# Patient Record
Sex: FEMALE | Race: WHITE | NOT HISPANIC OR LATINO | Employment: OTHER | URBAN - METROPOLITAN AREA
[De-identification: names, ages, dates, MRNs, and addresses within clinical notes are randomized per-mention and may not be internally consistent; named-entity substitution may affect disease eponyms.]

---

## 2017-02-13 ENCOUNTER — OFFICE VISIT (OUTPATIENT)
Dept: INTERNAL MEDICINE | Facility: CLINIC | Age: 72
End: 2017-02-13

## 2017-02-13 VITALS
OXYGEN SATURATION: 99 % | BODY MASS INDEX: 24.14 KG/M2 | HEART RATE: 77 BPM | DIASTOLIC BLOOD PRESSURE: 89 MMHG | WEIGHT: 132 LBS | SYSTOLIC BLOOD PRESSURE: 190 MMHG

## 2017-02-13 DIAGNOSIS — I10 ESSENTIAL HYPERTENSION: Primary | ICD-10-CM

## 2017-02-13 DIAGNOSIS — S99.922A FOOT TRAUMA, LEFT, INITIAL ENCOUNTER: ICD-10-CM

## 2017-02-13 PROBLEM — S99.929A FOOT TRAUMA: Status: ACTIVE | Noted: 2017-02-13

## 2017-02-13 PROCEDURE — 99214 OFFICE O/P EST MOD 30 MIN: CPT | Performed by: INTERNAL MEDICINE

## 2017-02-13 RX ORDER — ESCITALOPRAM OXALATE 5 MG/1
5 TABLET ORAL DAILY
Qty: 30 TABLET | Refills: 4 | Status: SHIPPED | OUTPATIENT
Start: 2017-02-13 | End: 2017-03-27 | Stop reason: SINTOL

## 2017-02-13 RX ORDER — AMLODIPINE BESYLATE 5 MG/1
5 TABLET ORAL DAILY
Qty: 30 TABLET | Refills: 6 | Status: SHIPPED | OUTPATIENT
Start: 2017-02-13 | End: 2017-08-28 | Stop reason: SINTOL

## 2017-02-13 NOTE — PROGRESS NOTES
Chief Complaint   Patient presents with   • Foot Pain     L foot fell about 2 months ago   • Hypertension       History of Present Illness   Brielle Jorge is a 72 y.o. female presents for acute care. Reports bending toes back 1 month ago. She had a great deal of pain w/ swelling and bruising at that time. Taking advil almost every night after the injury.   Has hypertension. She is taking carvedilol bid only for hypertension. Checking bp at home and it is running 130s-140s/70s-80s. No symptoms. She previously was taking lisinopril but stopped in 2015 when she was having hearing loss and patient had concerns that this could be linked to lisinopril. She was then tried on losartan and developed diarrhea.       The following portions of the patient's history were reviewed and updated as appropriate: allergies, current medications, past family history, past medical history, past social history, past surgical history and problem list.  Current Outpatient Prescriptions on File Prior to Visit   Medication Sig Dispense Refill   • albuterol (PROVENTIL HFA;VENTOLIN HFA) 108 (90 BASE) MCG/ACT inhaler Inhale 2 puffs every 4 (four) hours as needed for wheezing. 6.7 g 11   • amLODIPine (NORVASC) 2.5 MG tablet Take 1 tablet by mouth Daily. 30 tablet 1   • carvedilol (COREG) 12.5 MG tablet Take 1 tablet by mouth 2 (Two) Times a Day With Meals. 60 tablet 3   • hydrochlorothiazide (MICROZIDE) 12.5 MG capsule Take 1 capsule by mouth every morning. 30 capsule 6   • levothyroxine (SYNTHROID, LEVOTHROID) 88 MCG tablet Take 1 tablet by mouth daily. 30 tablet 5   • montelukast (SINGULAIR) 10 MG tablet Take 1 tablet by mouth Every Night. 30 tablet 6   • [DISCONTINUED] benzonatate (TESSALON) 200 MG capsule Take 1 capsule by mouth 3 (Three) Times a Day As Needed for cough. 30 capsule 1   • [DISCONTINUED] guaifenesin-codeine (GUAIFENESIN AC) 100-10 MG/5ML liquid Take 5 mL by mouth 3 (Three) Times a Day As Needed for cough. 118 mL 0     No current  facility-administered medications on file prior to visit.      Review of Systems   Constitutional: Negative.    HENT: Negative.    Eyes: Negative.    Respiratory: Negative.    Cardiovascular: Negative.    Gastrointestinal: Negative.    Endocrine: Negative.    Genitourinary: Negative.    Musculoskeletal: Negative.    Skin: Negative.    Allergic/Immunologic: Negative.    Neurological: Negative.    Hematological: Negative.    Psychiatric/Behavioral: Negative.        Objective   Physical Exam   Constitutional: She is oriented to person, place, and time. She appears well-developed and well-nourished.   HENT:   Head: Normocephalic and atraumatic.   Right Ear: External ear normal.   Left Ear: External ear normal.   Nose: Nose normal.   Mouth/Throat: Oropharynx is clear and moist.   Eyes: Conjunctivae and EOM are normal. Pupils are equal, round, and reactive to light.   Neck: Normal range of motion. Neck supple.   Cardiovascular: Normal rate, regular rhythm, normal heart sounds and intact distal pulses.    Pulmonary/Chest: Effort normal and breath sounds normal.   Abdominal: Soft. Bowel sounds are normal.   Musculoskeletal: Normal range of motion.   Left foot w/ mild erythema. dysfigurement of left great toe but appears chronic and is related to a surgical scar at [facility] location. Able to move in all directions and palpate fully w/out tenderness. Able to weight bear w/out tenderness.    Neurological: She is alert and oriented to person, place, and time. She has normal reflexes.   Skin: Skin is warm and dry.   Psychiatric: She has a normal mood and affect. Her behavior is normal. Judgment and thought content normal.   Nursing note and vitals reviewed.       Visit Vitals   • BP (!) 190/89   • Pulse 77   • Wt 132 lb (59.9 kg)   • SpO2 99%   • BMI 24.14 kg/m2     Benicar/hctz 20/12.5 given at 2:15  bp at 2:45_   Assessment/Plan   Diagnoses and all orders for this visit:    Essential hypertension    Foot trauma, left, initial  encounter      Patient w/ hypertension now hypertensive urgency. She has been taking ibuprofen qhs and is directed to discontinue this medication and take tylenol prn pain. She will continue bid carvedilol. Will start hctz 12.5 mg daily and titrate dose as needed. May also increase carvedilol to 25 bid if needed v start norvasc daily. Will repeat bp in 1 week.

## 2017-03-07 RX ORDER — LEVOTHYROXINE SODIUM 88 UG/1
TABLET ORAL
Qty: 30 TABLET | Refills: 5 | Status: SHIPPED | OUTPATIENT
Start: 2017-03-07 | End: 2017-09-04 | Stop reason: SDUPTHER

## 2017-03-27 ENCOUNTER — OFFICE VISIT (OUTPATIENT)
Dept: INTERNAL MEDICINE | Facility: CLINIC | Age: 72
End: 2017-03-27

## 2017-03-27 VITALS
WEIGHT: 129 LBS | HEART RATE: 75 BPM | OXYGEN SATURATION: 99 % | BODY MASS INDEX: 23.59 KG/M2 | DIASTOLIC BLOOD PRESSURE: 72 MMHG | SYSTOLIC BLOOD PRESSURE: 158 MMHG

## 2017-03-27 DIAGNOSIS — F41.9 ANXIETY: ICD-10-CM

## 2017-03-27 DIAGNOSIS — I10 ESSENTIAL HYPERTENSION: Primary | ICD-10-CM

## 2017-03-27 DIAGNOSIS — E03.9 ACQUIRED HYPOTHYROIDISM: ICD-10-CM

## 2017-03-27 DIAGNOSIS — E78.5 HYPERLIPIDEMIA, UNSPECIFIED HYPERLIPIDEMIA TYPE: ICD-10-CM

## 2017-03-27 LAB
ALBUMIN SERPL-MCNC: 4.6 G/DL (ref 3.5–5.2)
ALBUMIN/GLOB SERPL: 1.7 G/DL
ALP SERPL-CCNC: 101 U/L (ref 39–117)
ALT SERPL-CCNC: 19 U/L (ref 1–33)
AST SERPL-CCNC: 27 U/L (ref 1–32)
BASOPHILS # BLD AUTO: 0.03 10*3/MM3 (ref 0–0.2)
BASOPHILS NFR BLD AUTO: 0.4 % (ref 0–1.5)
BILIRUB SERPL-MCNC: 0.4 MG/DL (ref 0.1–1.2)
BUN SERPL-MCNC: 13 MG/DL (ref 8–23)
BUN/CREAT SERPL: 24.1 (ref 7–25)
CALCIUM SERPL-MCNC: 10.3 MG/DL (ref 8.6–10.5)
CHLORIDE SERPL-SCNC: 101 MMOL/L (ref 98–107)
CHOLEST SERPL-MCNC: 263 MG/DL (ref 0–200)
CO2 SERPL-SCNC: 26.8 MMOL/L (ref 22–29)
CREAT SERPL-MCNC: 0.54 MG/DL (ref 0.57–1)
EOSINOPHIL # BLD AUTO: 0.32 10*3/MM3 (ref 0–0.7)
EOSINOPHIL NFR BLD AUTO: 4.4 % (ref 0.3–6.2)
ERYTHROCYTE [DISTWIDTH] IN BLOOD BY AUTOMATED COUNT: 14 % (ref 11.7–13)
GLOBULIN SER CALC-MCNC: 2.7 GM/DL
GLUCOSE SERPL-MCNC: 97 MG/DL (ref 65–99)
HCT VFR BLD AUTO: 38.7 % (ref 35.6–45.5)
HDLC SERPL-MCNC: 76 MG/DL (ref 40–60)
HGB BLD-MCNC: 12.8 G/DL (ref 11.9–15.5)
IMM GRANULOCYTES # BLD: 0.02 10*3/MM3 (ref 0–0.03)
IMM GRANULOCYTES NFR BLD: 0.3 % (ref 0–0.5)
LDLC SERPL CALC-MCNC: 153 MG/DL (ref 0–100)
LDLC/HDLC SERPL: 2.01 {RATIO}
LYMPHOCYTES # BLD AUTO: 1.84 10*3/MM3 (ref 0.9–4.8)
LYMPHOCYTES NFR BLD AUTO: 25.1 % (ref 19.6–45.3)
MCH RBC QN AUTO: 29.2 PG (ref 26.9–32)
MCHC RBC AUTO-ENTMCNC: 33.1 G/DL (ref 32.4–36.3)
MCV RBC AUTO: 88.2 FL (ref 80.5–98.2)
MONOCYTES # BLD AUTO: 0.58 10*3/MM3 (ref 0.2–1.2)
MONOCYTES NFR BLD AUTO: 7.9 % (ref 5–12)
NEUTROPHILS # BLD AUTO: 4.55 10*3/MM3 (ref 1.9–8.1)
NEUTROPHILS NFR BLD AUTO: 61.9 % (ref 42.7–76)
PLATELET # BLD AUTO: 430 10*3/MM3 (ref 140–500)
POTASSIUM SERPL-SCNC: 4.1 MMOL/L (ref 3.5–5.2)
PROT SERPL-MCNC: 7.3 G/DL (ref 6–8.5)
RBC # BLD AUTO: 4.39 10*6/MM3 (ref 3.9–5.2)
SODIUM SERPL-SCNC: 142 MMOL/L (ref 136–145)
TRIGL SERPL-MCNC: 172 MG/DL (ref 0–150)
TSH SERPL DL<=0.005 MIU/L-ACNC: 1.74 MIU/ML (ref 0.27–4.2)
VLDLC SERPL CALC-MCNC: 34.4 MG/DL (ref 5–40)
WBC # BLD AUTO: 7.34 10*3/MM3 (ref 4.5–10.7)

## 2017-03-27 PROCEDURE — 99214 OFFICE O/P EST MOD 30 MIN: CPT | Performed by: INTERNAL MEDICINE

## 2017-03-27 RX ORDER — SERTRALINE HYDROCHLORIDE 25 MG/1
25 TABLET, FILM COATED ORAL DAILY
Qty: 30 TABLET | Refills: 4 | Status: SHIPPED | OUTPATIENT
Start: 2017-03-27 | End: 2017-08-28

## 2017-03-27 NOTE — PROGRESS NOTES
Chief Complaint   Patient presents with   • Hypertension   anxiety  Hypothyroidism  hyperlipidemia    History of Present Illness   Brielle Jorge is a 72 y.o. female presents for follow up evaluation on blood pressure. At home her bp levels have been normotensive. Cuff does have discrepencies from our readings here. She is taking carvedilol, norvasc, and hctz now. She feels that she is noticing some gi upset w/ diarrhea after starting amlodipine. She has hypothryoidism and feels clinically euthyroid. She is anxious.     The following portions of the patient's history were reviewed and updated as appropriate: allergies, current medications, past family history, past medical history, past social history, past surgical history and problem list.  Current Outpatient Prescriptions on File Prior to Visit   Medication Sig Dispense Refill   • albuterol (PROVENTIL HFA;VENTOLIN HFA) 108 (90 BASE) MCG/ACT inhaler Inhale 2 puffs every 4 (four) hours as needed for wheezing. 6.7 g 11   • amLODIPine (NORVASC) 5 MG tablet Take 1 tablet by mouth Daily. 30 tablet 6   • carvedilol (COREG) 12.5 MG tablet Take 1 tablet by mouth 2 (Two) Times a Day With Meals. 60 tablet 3   • hydrochlorothiazide (MICROZIDE) 12.5 MG capsule Take 1 capsule by mouth every morning. 30 capsule 6   • levothyroxine (SYNTHROID, LEVOTHROID) 88 MCG tablet TAKE 1 TABLET BY MOUTH DAILY 30 tablet 5   • montelukast (SINGULAIR) 10 MG tablet Take 1 tablet by mouth Every Night. 30 tablet 6   • [DISCONTINUED] escitalopram (LEXAPRO) 5 MG tablet Take 1 tablet by mouth Daily. 30 tablet 4     No current facility-administered medications on file prior to visit.      Review of Systems   Constitutional: Negative.    HENT: Negative.    Eyes: Negative.    Respiratory: Negative.    Cardiovascular: Negative.    Gastrointestinal:        Some gi discomfort after taking amlodipine. Improving.    Endocrine: Negative.    Genitourinary: Negative.         Some freq and rare leakage    Musculoskeletal: Negative.    Skin: Negative.    Neurological: Negative.    Hematological: Negative.    Psychiatric/Behavioral: Positive for sleep disturbance.       Objective   Physical Exam   Constitutional: She is oriented to person, place, and time. She appears well-developed and well-nourished.   HENT:   Head: Normocephalic and atraumatic.   Cerumen B  Removed w/ lighted loops.    Eyes: Conjunctivae and EOM are normal. Pupils are equal, round, and reactive to light.   Neck: Normal range of motion. Neck supple.   Cardiovascular: Normal rate, regular rhythm and normal heart sounds.    Pulmonary/Chest: Effort normal and breath sounds normal.   Abdominal: Soft. Bowel sounds are normal.   Musculoskeletal: Normal range of motion.   Neurological: She is alert and oriented to person, place, and time. She has normal reflexes.   Skin: Skin is warm and dry.   Psychiatric: She has a normal mood and affect. Her behavior is normal. Judgment and thought content normal.   Nursing note and vitals reviewed.       /72  Pulse 75  Wt 129 lb (58.5 kg)  SpO2 99%  BMI 23.59 kg/m2    Assessment/Plan   Diagnoses and all orders for this visit:    Hyperlipidemia, unspecified hyperlipidemia type    Essential hypertension    Acquired hypothyroidism    Anxiety    Other orders  -     sertraline (ZOLOFT) 25 MG tablet; Take 1 tablet by mouth Daily.      Patient w/ hypertension. bp is improving but still elevated. She has some continued anxious symptoms. Will try zoloft daily and monitor bp. May increase carvedilol in there future if needed. Will test thyroid levels. Has hyperlip. Has been statin intolerant. Will test levels. She will f/u in 4-6 months or prn.

## 2017-03-27 NOTE — PROGRESS NOTES
Your laboratory results are NORMAL except for elevated cholesterol levels. Reduce dietary cholesterol. We will discuss these results in detail at your next office visit. Please call with any questions or concerns.  Sincerely,  Kacie Loving MD

## 2017-04-07 RX ORDER — ALBUTEROL SULFATE 90 UG/1
AEROSOL, METERED RESPIRATORY (INHALATION)
Qty: 18 INHALER | Refills: 0 | Status: SHIPPED | OUTPATIENT
Start: 2017-04-07 | End: 2018-03-13 | Stop reason: SDUPTHER

## 2017-04-12 RX ORDER — CARVEDILOL 12.5 MG/1
TABLET ORAL
Qty: 60 TABLET | Refills: 3 | Status: SHIPPED | OUTPATIENT
Start: 2017-04-12 | End: 2017-08-16 | Stop reason: SDUPTHER

## 2017-04-14 ENCOUNTER — TELEPHONE (OUTPATIENT)
Dept: INTERNAL MEDICINE | Facility: CLINIC | Age: 72
End: 2017-04-14

## 2017-04-14 NOTE — TELEPHONE ENCOUNTER
Pt is in [place] and says that her feet are swelling  Thinks it is her Norvasc wants to know if she can just take her Norvasc     i LM to call me to get more info

## 2017-04-26 RX ORDER — HYDROCHLOROTHIAZIDE 12.5 MG/1
CAPSULE, GELATIN COATED ORAL
Qty: 30 CAPSULE | Refills: 1 | Status: SHIPPED | OUTPATIENT
Start: 2017-04-26 | End: 2018-01-02

## 2017-06-01 RX ORDER — LEVOTHYROXINE SODIUM 0.07 MG/1
TABLET ORAL
Qty: 90 TABLET | Refills: 0 | Status: SHIPPED | OUTPATIENT
Start: 2017-06-01 | End: 2017-08-14

## 2017-07-13 ENCOUNTER — TRANSCRIBE ORDERS (OUTPATIENT)
Dept: INTERNAL MEDICINE | Facility: CLINIC | Age: 72
End: 2017-07-13

## 2017-07-13 DIAGNOSIS — Z12.31 VISIT FOR SCREENING MAMMOGRAM: Primary | ICD-10-CM

## 2017-08-03 DIAGNOSIS — I10 ESSENTIAL HYPERTENSION: Primary | ICD-10-CM

## 2017-08-03 DIAGNOSIS — E03.9 ACQUIRED HYPOTHYROIDISM: ICD-10-CM

## 2017-08-03 DIAGNOSIS — E78.5 HYPERLIPIDEMIA, UNSPECIFIED HYPERLIPIDEMIA TYPE: ICD-10-CM

## 2017-08-07 LAB
BUN SERPL-MCNC: 14 MG/DL (ref 8–23)
BUN/CREAT SERPL: 25.5 (ref 7–25)
CALCIUM SERPL-MCNC: 10.3 MG/DL (ref 8.6–10.5)
CHLORIDE SERPL-SCNC: 102 MMOL/L (ref 98–107)
CHOLEST SERPL-MCNC: 244 MG/DL (ref 0–200)
CO2 SERPL-SCNC: 25.7 MMOL/L (ref 22–29)
CREAT SERPL-MCNC: 0.55 MG/DL (ref 0.57–1)
GLUCOSE SERPL-MCNC: 99 MG/DL (ref 65–99)
HDLC SERPL-MCNC: 69 MG/DL (ref 40–60)
LDLC SERPL CALC-MCNC: 144 MG/DL (ref 0–100)
LDLC/HDLC SERPL: 2.08 {RATIO}
POTASSIUM SERPL-SCNC: 4.2 MMOL/L (ref 3.5–5.2)
SODIUM SERPL-SCNC: 142 MMOL/L (ref 136–145)
TRIGL SERPL-MCNC: 156 MG/DL (ref 0–150)
VLDLC SERPL CALC-MCNC: 31.2 MG/DL (ref 5–40)

## 2017-08-14 ENCOUNTER — OFFICE VISIT (OUTPATIENT)
Dept: INTERNAL MEDICINE | Facility: CLINIC | Age: 72
End: 2017-08-14

## 2017-08-14 VITALS
SYSTOLIC BLOOD PRESSURE: 140 MMHG | HEART RATE: 80 BPM | BODY MASS INDEX: 23.96 KG/M2 | OXYGEN SATURATION: 98 % | WEIGHT: 131 LBS | DIASTOLIC BLOOD PRESSURE: 84 MMHG

## 2017-08-14 DIAGNOSIS — E03.9 ACQUIRED HYPOTHYROIDISM: ICD-10-CM

## 2017-08-14 DIAGNOSIS — Z00.00 HEALTHCARE MAINTENANCE: ICD-10-CM

## 2017-08-14 DIAGNOSIS — E78.5 HYPERLIPIDEMIA, UNSPECIFIED HYPERLIPIDEMIA TYPE: ICD-10-CM

## 2017-08-14 DIAGNOSIS — I10 ESSENTIAL HYPERTENSION: Primary | ICD-10-CM

## 2017-08-14 DIAGNOSIS — F41.9 ANXIETY: ICD-10-CM

## 2017-08-14 PROCEDURE — G0439 PPPS, SUBSEQ VISIT: HCPCS | Performed by: INTERNAL MEDICINE

## 2017-08-14 PROCEDURE — 99214 OFFICE O/P EST MOD 30 MIN: CPT | Performed by: INTERNAL MEDICINE

## 2017-08-14 NOTE — PATIENT INSTRUCTIONS
Medicare Wellness  Personal Prevention Plan of Service     Date of Office Visit:  2017  Encounter Provider:  Kacie Loving MD  Place of Service:  [facility]  Patient Name: Brielle Jorge  :  1945    As part of the Medicare Wellness portion of your visit today, we are providing you with this personalized preventive plan of services (PPPS). This plan is based upon recommendations of the [place] Preventive Services Task Force (USPSTF) and the Advisory Committee on Immunization Practices (ACIP).    This lists the preventive care services that should be considered, and provides dates of when you are due. Items listed as completed are up-to-date and do not require any further intervention.    Health Maintenance   Topic Date Due   • TDAP/TD VACCINES (1 - Tdap) 2014   • PNEUMOCOCCAL VACCINES (65+ LOW/MEDIUM RISK) (2 of 2 - PPSV23) 2017   • INFLUENZA VACCINE  2017   • DXA SCAN  2017   • LIPID PANEL  2018   • MEDICARE ANNUAL WELLNESS  2018   • MAMMOGRAM  2018   • COLONOSCOPY  2025   • ZOSTER VACCINE  Completed       Orders Placed This Encounter   Procedures   • ECG 12 Lead     Order Specific Question:   Reason for Exam:     Answer:   hm       Return in about 6 months (around 2018) for Recheck.

## 2017-08-14 NOTE — PROGRESS NOTES
Subjective   AWV, htn, hypothyroid, anxiety, hyperlipidemia, osteopenia    Brielle Jorge is a 72 y.o. female who presents for an annual wellness visit and review of chronic issues.  She is doing well today. She has a history of hypothyroidism that is well managed w/ synthroid therapy. She has hypertension that is difficult to manage. At home she reports bp running 120s-140s/ 70s-80s. Negative evaluation for renal arterial stenosis. She has some anxious symptoms and zoloft was rxd at her last visit. She did not feel well taking this so it was discontinued. She has dyslipidemia and is not interested in lipid lowering therapy due to statin intolerance. She has osteoporosis. She has been offered meds but has had concerns of possible side effects so has not started.       Review of Systems   Constitutional: Negative.    HENT: Negative.    Eyes: Negative.    Respiratory: Negative.    Cardiovascular: Negative.    Gastrointestinal: Negative.    Endocrine: Negative.    Genitourinary: Negative.    Musculoskeletal: Negative.    Skin: Negative.    Allergic/Immunologic: Negative.    Neurological: Negative.    Hematological: Negative.    Psychiatric/Behavioral: Negative.        The following portions of the patient's history were reviewed and updated as appropriate: allergies, current medications, past family history, past medical history, past social history, past surgical history and problem list.     Patient Active Problem List   Diagnosis   • Anxiety   • Hyperlipidemia   • Hypertension   • Hypothyroidism   • Pain in shoulder   • Bronchitis   • Leg edema, left   • Foot trauma       Past Medical History:   Diagnosis Date   • Acute otitis media    • Cystocele    • Fracture of patella    • Hyperlipidemia    • Hypothyroidism    • Rectocele    • Right shoulder pain        Past Surgical History:   Procedure Laterality Date   • CHOLECYSTECTOMY     • HERNIA REPAIR     • HYSTERECTOMY     • OOPHORECTOMY     • OTHER SURGICAL HISTORY       HIP REPLACEMENT, RIGHT   • REPAIR RECTOCELE         Family History   Problem Relation Age of Onset   • Hypertension Mother    • Thyroid disease Mother    • Asthma Father    • Emphysema Father    • Alcohol abuse Father    • Hypertension Father    • Thyroid disease Daughter    • Diabetes type I Daughter    • Stroke Maternal Grandfather      ischemic       Social History     Social History   • Marital status:      Spouse name: N/A   • Number of children: N/A   • Years of education: N/A     Occupational History   • Not on file.     Social History Main Topics   • Smoking status: Never Smoker   • Smokeless tobacco: Not on file   • Alcohol use No   • Drug use: Not on file   • Sexual activity: Not on file     Other Topics Concern   • Not on file     Social History Narrative       Current Outpatient Prescriptions on File Prior to Visit   Medication Sig Dispense Refill   • carvedilol (COREG) 12.5 MG tablet TAKE 1 TABLET BY MOUTH 2 (TWO) TIMES A DAY WITH MEALS. 60 tablet 3   • hydrochlorothiazide (MICROZIDE) 12.5 MG capsule TAKE 1 CAPSULE BY MOUTH EVERY MORNING. 30 capsule 1   • levothyroxine (SYNTHROID, LEVOTHROID) 88 MCG tablet TAKE 1 TABLET BY MOUTH DAILY 30 tablet 5   • montelukast (SINGULAIR) 10 MG tablet Take 1 tablet by mouth Every Night. 30 tablet 6   • VENTOLIN  (90 BASE) MCG/ACT inhaler INHALE 2 PUFFS EVERY 4 (FOUR) HOURS AS NEEDED FOR WHEEZING. 18 inhaler 0   • amLODIPine (NORVASC) 5 MG tablet Take 1 tablet by mouth Daily. 30 tablet 6   • sertraline (ZOLOFT) 25 MG tablet Take 1 tablet by mouth Daily. 30 tablet 4   • [DISCONTINUED] levothyroxine (SYNTHROID, LEVOTHROID) 75 MCG tablet TAKE 1 TABLET BY MOUTH DAILY 90 tablet 0     No current facility-administered medications on file prior to visit.        Allergies   Allergen Reactions   • Codeine    • Penicillins Hives   • Statins Myalgia   • Sulfa Antibiotics        Immunization History   Administered Date(s) Administered   • Influenza, Quadrivalent  08/31/2015   • Pneumococcal Conjugate 13-Valent 08/08/2016   • Pneumococcal Polysaccharide 01/01/2009   • Td 07/15/2014   • Zoster 01/01/2008       Objective     /84  Pulse 80  Wt 131 lb (59.4 kg)  SpO2 98%  BMI 23.96 kg/m2    Physical Exam   Constitutional: She is oriented to person, place, and time. She appears well-developed and well-nourished.   HENT:   Head: Normocephalic and atraumatic.   Right Ear: External ear normal.   Left Ear: External ear normal.   Nose: Nose normal.   Mouth/Throat: Oropharynx is clear and moist.   Eyes: EOM are normal. Pupils are equal, round, and reactive to light.   Neck: Normal range of motion. Neck supple.   Cardiovascular: Normal rate, regular rhythm and normal heart sounds.    Pulmonary/Chest: Effort normal and breath sounds normal. No respiratory distress.   Abdominal: Soft. Bowel sounds are normal.   Genitourinary: Vagina normal. Rectal exam shows guaiac negative stool. No vaginal discharge found.   Genitourinary Comments: Uterus surgically absent   Musculoskeletal: Normal range of motion.   Neurological: She is alert and oriented to person, place, and time.   Skin: Skin is warm and dry.   Psychiatric: She has a normal mood and affect. Her behavior is normal. Judgment and thought content normal.   Nursing note and vitals reviewed.  EKG sinus nonspecific st changes. Unchanged from prior tracings.     Assessment/Plan   Brielle was seen today for annual exam.    Diagnoses and all orders for this visit:    Essential hypertension  -     ECG 12 Lead    Healthcare maintenance    Acquired hypothyroidism    Hyperlipidemia, unspecified hyperlipidemia type    Anxiety        Discussion    AWV.  See scanned forms for mera history, PHQ-9, functional ability questionnaire, cognitive impairment screening and preventive services check list.  These were all reviewed with the patient and the patient was provided with a copy of the preventative services checklist. Patient was given health  advice or handouts on the following:  nutrition, fall prevention, exercise. She will continue a healthy diet w/ routine fitness. She will continue medication for bp, and thyroid. She will try red yeast rice for lipid lowering benefits. Will test DEXA and consider prolia or other. Low sodium diet w/ copious fluids. Remain off of zoloft for anxiety.              Future Appointments  Date Time Provider Department Center   8/21/2017 9:00 AM [facility]  [facility]

## 2017-08-16 RX ORDER — CARVEDILOL 12.5 MG/1
TABLET ORAL
Qty: 60 TABLET | Refills: 3 | Status: SHIPPED | OUTPATIENT
Start: 2017-08-16 | End: 2017-11-21 | Stop reason: SDUPTHER

## 2017-08-21 ENCOUNTER — HOSPITAL ENCOUNTER (OUTPATIENT)
Dept: MAMMOGRAPHY | Facility: HOSPITAL | Age: 72
Discharge: HOME | End: 2017-08-21
Admitting: INTERNAL MEDICINE

## 2017-08-21 ENCOUNTER — CLINICAL SUPPORT (OUTPATIENT)
Dept: INTERNAL MEDICINE | Facility: CLINIC | Age: 72
End: 2017-08-21

## 2017-08-21 DIAGNOSIS — Z12.31 VISIT FOR SCREENING MAMMOGRAM: ICD-10-CM

## 2017-08-21 DIAGNOSIS — Z78.0 POSTMENOPAUSAL: Primary | ICD-10-CM

## 2017-08-21 PROCEDURE — G0202 SCR MAMMO BI INCL CAD: HCPCS

## 2017-08-21 PROCEDURE — 77080 DXA BONE DENSITY AXIAL: CPT | Performed by: INTERNAL MEDICINE

## 2017-08-28 RX ORDER — ALENDRONATE SODIUM 70 MG/1
70 TABLET ORAL
Qty: 12 TABLET | Refills: 2 | Status: SHIPPED | OUTPATIENT
Start: 2017-08-28 | End: 2018-01-02

## 2017-09-05 RX ORDER — LEVOTHYROXINE SODIUM 88 UG/1
TABLET ORAL
Qty: 30 TABLET | Refills: 5 | Status: SHIPPED | OUTPATIENT
Start: 2017-09-05 | End: 2017-12-31 | Stop reason: SDUPTHER

## 2017-11-21 RX ORDER — CARVEDILOL 12.5 MG/1
12.5 TABLET ORAL 2 TIMES DAILY WITH MEALS
Qty: 180 TABLET | Refills: 1 | Status: SHIPPED | OUTPATIENT
Start: 2017-11-21 | End: 2018-06-28 | Stop reason: SDUPTHER

## 2017-12-28 RX ORDER — FLUTICASONE PROPIONATE 50 MCG
SPRAY, SUSPENSION (ML) NASAL
Qty: 16 ML | Refills: 2 | Status: SHIPPED | OUTPATIENT
Start: 2017-12-28 | End: 2018-04-16 | Stop reason: SDUPTHER

## 2018-01-02 ENCOUNTER — OFFICE VISIT (OUTPATIENT)
Dept: INTERNAL MEDICINE | Facility: CLINIC | Age: 73
End: 2018-01-02

## 2018-01-02 ENCOUNTER — TELEPHONE (OUTPATIENT)
Dept: INTERNAL MEDICINE | Facility: CLINIC | Age: 73
End: 2018-01-02

## 2018-01-02 VITALS
DIASTOLIC BLOOD PRESSURE: 80 MMHG | TEMPERATURE: 99.2 F | HEART RATE: 84 BPM | SYSTOLIC BLOOD PRESSURE: 140 MMHG | OXYGEN SATURATION: 95 %

## 2018-01-02 DIAGNOSIS — R50.81 FEVER IN OTHER DISEASES: Primary | ICD-10-CM

## 2018-01-02 DIAGNOSIS — J01.00 ACUTE NON-RECURRENT MAXILLARY SINUSITIS: ICD-10-CM

## 2018-01-02 DIAGNOSIS — I10 ESSENTIAL HYPERTENSION: ICD-10-CM

## 2018-01-02 LAB
EXPIRATION DATE: NORMAL
FLUAV AG NPH QL: NORMAL
FLUBV AG NPH QL: NORMAL
INTERNAL CONTROL: NORMAL
Lab: NORMAL

## 2018-01-02 PROCEDURE — 87804 INFLUENZA ASSAY W/OPTIC: CPT | Performed by: INTERNAL MEDICINE

## 2018-01-02 PROCEDURE — 99214 OFFICE O/P EST MOD 30 MIN: CPT | Performed by: INTERNAL MEDICINE

## 2018-01-02 RX ORDER — DOXYCYCLINE HYCLATE 100 MG/1
100 TABLET, DELAYED RELEASE ORAL 2 TIMES DAILY
Qty: 14 TABLET | Refills: 0 | Status: SHIPPED | OUTPATIENT
Start: 2018-01-02 | End: 2018-02-12

## 2018-01-02 RX ORDER — SPIRONOLACTONE 50 MG/1
50 TABLET, FILM COATED ORAL DAILY
Qty: 30 TABLET | Refills: 5 | Status: SHIPPED | OUTPATIENT
Start: 2018-01-02 | End: 2018-08-20 | Stop reason: SDUPTHER

## 2018-01-02 RX ORDER — GUAIFENESIN AND CODEINE PHOSPHATE 100; 10 MG/5ML; MG/5ML
5 SOLUTION ORAL 3 TIMES DAILY PRN
Qty: 180 ML | Refills: 1 | Status: SHIPPED | OUTPATIENT
Start: 2018-01-02 | End: 2018-08-20

## 2018-01-02 RX ORDER — LEVOTHYROXINE SODIUM 88 UG/1
TABLET ORAL
Qty: 30 TABLET | Refills: 1 | Status: SHIPPED | OUTPATIENT
Start: 2018-01-02 | End: 2018-03-11 | Stop reason: SDUPTHER

## 2018-01-02 NOTE — PROGRESS NOTES
"Chief Complaint   Patient presents with   • Sinusitis     Fever, cough, sinus pressure and pain, headache    History of Present Illness   Brielle Jorge is a 72 y.o. female presents for acute care. Reports increasing sinus drainage w/ febrile sensation, persistent cough, fatigue. Negative flu swab today. \"it feels likes a sinus infection\".   She is taking tylenol, cloracetin w/out much benefit. Grandchildren w/ the flu. She was with them \"off and on\" all week.   Has hypertension. Recent skin changes and has been advised spironolactone therapy.           The following portions of the patient's history were reviewed and updated as appropriate: allergies, current medications, past family history, past medical history, past social history, past surgical history and problem list.  Current Outpatient Prescriptions on File Prior to Visit   Medication Sig Dispense Refill   • carvedilol (COREG) 12.5 MG tablet Take 1 tablet by mouth 2 (Two) Times a Day With Meals. 180 tablet 1   • fluticasone (FLONASE) 50 MCG/ACT nasal spray USE 2 SPRAYS IN EACH NOSTRIL DAILY 16 mL 2   • levothyroxine (SYNTHROID, LEVOTHROID) 88 MCG tablet TAKE 1 TABLET BY MOUTH DAILY 30 tablet 1   • VENTOLIN  (90 BASE) MCG/ACT inhaler INHALE 2 PUFFS EVERY 4 (FOUR) HOURS AS NEEDED FOR WHEEZING. 18 inhaler 0   • [DISCONTINUED] alendronate (FOSAMAX) 70 MG tablet Take 1 tablet by mouth Every 7 (Seven) Days. 12 tablet 2   • [DISCONTINUED] hydrochlorothiazide (MICROZIDE) 12.5 MG capsule TAKE 1 CAPSULE BY MOUTH EVERY MORNING. 30 capsule 1     No current facility-administered medications on file prior to visit.      Review of Systems   Constitutional: Positive for fatigue and fever.   HENT: Positive for postnasal drip, rhinorrhea, sinus pain, sinus pressure and sore throat.    Eyes: Negative.    Respiratory: Positive for cough.    Cardiovascular: Negative.    Gastrointestinal: Negative.    Endocrine: Negative.    Genitourinary: Negative.    Musculoskeletal: " Negative.    Skin: Negative.    Allergic/Immunologic: Negative.    Neurological: Negative.    Hematological: Negative.    Psychiatric/Behavioral: Negative.        Objective   Physical Exam   Constitutional: She is oriented to person, place, and time. She appears well-developed and well-nourished.   HENT:   Head: Normocephalic and atraumatic.   Right Ear: External ear normal.   Left Ear: External ear normal.   Maxillary sinus pressure  Pharyngeal erythema   Eyes: EOM are normal. Pupils are equal, round, and reactive to light.   Neck: Normal range of motion. Neck supple.   Shotty lad   Cardiovascular: Normal rate, regular rhythm, normal heart sounds and intact distal pulses.    Pulmonary/Chest: Effort normal and breath sounds normal.   Abdominal: Soft. Bowel sounds are normal.   Musculoskeletal: Normal range of motion.   Neurological: She is alert and oriented to person, place, and time.   Skin: Skin is warm and dry.   Psychiatric: She has a normal mood and affect. Her behavior is normal. Judgment and thought content normal.   Nursing note and vitals reviewed.       /80  Pulse 84  Temp 99.2 °F (37.3 °C)  SpO2 95%    Assessment/Plan   Diagnoses and all orders for this visit:    bronchitis  -     POC Influenza A / B    Essential hypertension    Acute non-recurrent maxillary sinusitis    Other orders  -     doxycycline (DORYX) 100 MG enteric coated tablet; Take 1 tablet by mouth 2 (Two) Times a Day.  -     guaifenesin-codeine (GUAIFENESIN AC) 100-10 MG/5ML liquid; Take 5 mL by mouth 3 (Three) Times a Day As Needed for Cough.  -     spironolactone (ALDACTONE) 50 MG tablet; Take 1 tablet by mouth Daily.    patient w/ acute fever, sinus pressure and cough. Apparent sinusitis/ bronchitis. She may have viral component but flu testing is neg and almost 72 hours from onset. She will start doxy 100 mg bid. Prn cheratussin.   She has continued hypertension. She has some skin changes and spironolactone advised for this.  Will start aldactone after completing doxycycline. She will have bp testing and bmp in 2 weeks.

## 2018-01-03 ENCOUNTER — TELEPHONE (OUTPATIENT)
Dept: INTERNAL MEDICINE | Facility: CLINIC | Age: 73
End: 2018-01-03

## 2018-01-03 NOTE — TELEPHONE ENCOUNTER
Patient called and stated that she does not have her antibiotic yet. She was seen by Dr. Loving yesterday and Dr. Loving prescribe her an antibiotic. The antibiotic was $174 dollars and the patient could not afford that. So the pharmacist told the patient that he/she would call the office to see if Dr. Loving can change the antibiotic/prescription. Pharmacist told patient that they contacted the office several times and still have not heard a response. Patient called today and stated that she is still without the antibiotic and is worried that she will not get it because Dr. Loving is not here. Dr. Clark is the covering doctor for Dr. Loving today. Could Dr. Clark send in a different antibiotic medication for patient or what does she suggest for patient?  Best call back number is [phone] SHAHEEN Mayo called pharmacy.  Issue is that doryx is not covered.  We changed to regular immediate release doxycycline.  KATYA

## 2018-01-12 DIAGNOSIS — E03.9 ACQUIRED HYPOTHYROIDISM: ICD-10-CM

## 2018-01-12 DIAGNOSIS — E78.5 HYPERLIPIDEMIA, UNSPECIFIED HYPERLIPIDEMIA TYPE: ICD-10-CM

## 2018-01-12 DIAGNOSIS — I10 ESSENTIAL HYPERTENSION: Primary | ICD-10-CM

## 2018-02-05 LAB
ALBUMIN SERPL-MCNC: 4.9 G/DL (ref 3.5–5.2)
ALBUMIN/GLOB SERPL: 2.1 G/DL
ALP SERPL-CCNC: 74 U/L (ref 39–117)
ALT SERPL-CCNC: 11 U/L (ref 1–33)
AST SERPL-CCNC: 20 U/L (ref 1–32)
BILIRUB SERPL-MCNC: 0.4 MG/DL (ref 0.1–1.2)
BUN SERPL-MCNC: 15 MG/DL (ref 8–23)
BUN/CREAT SERPL: 25.4 (ref 7–25)
CALCIUM SERPL-MCNC: 10 MG/DL (ref 8.6–10.5)
CHLORIDE SERPL-SCNC: 102 MMOL/L (ref 98–107)
CO2 SERPL-SCNC: 26.5 MMOL/L (ref 22–29)
CREAT SERPL-MCNC: 0.59 MG/DL (ref 0.57–1)
GFR SERPLBLD CREATININE-BSD FMLA CKD-EPI: 100 ML/MIN/1.73
GFR SERPLBLD CREATININE-BSD FMLA CKD-EPI: 121 ML/MIN/1.73
GLOBULIN SER CALC-MCNC: 2.3 GM/DL
GLUCOSE SERPL-MCNC: 100 MG/DL (ref 65–99)
POTASSIUM SERPL-SCNC: 4.1 MMOL/L (ref 3.5–5.2)
PROT SERPL-MCNC: 7.2 G/DL (ref 6–8.5)
SODIUM SERPL-SCNC: 143 MMOL/L (ref 136–145)
TSH SERPL DL<=0.005 MIU/L-ACNC: 2.36 MIU/ML (ref 0.27–4.2)

## 2018-02-12 ENCOUNTER — OFFICE VISIT (OUTPATIENT)
Dept: INTERNAL MEDICINE | Facility: CLINIC | Age: 73
End: 2018-02-12

## 2018-02-12 VITALS
DIASTOLIC BLOOD PRESSURE: 74 MMHG | SYSTOLIC BLOOD PRESSURE: 138 MMHG | WEIGHT: 129 LBS | HEART RATE: 67 BPM | BODY MASS INDEX: 23.59 KG/M2 | OXYGEN SATURATION: 99 %

## 2018-02-12 DIAGNOSIS — E03.9 ACQUIRED HYPOTHYROIDISM: ICD-10-CM

## 2018-02-12 DIAGNOSIS — I10 ESSENTIAL HYPERTENSION: Primary | ICD-10-CM

## 2018-02-12 PROCEDURE — 99213 OFFICE O/P EST LOW 20 MIN: CPT | Performed by: INTERNAL MEDICINE

## 2018-02-12 NOTE — PROGRESS NOTES
"Chief Complaint   Patient presents with   • Hypertension   htn, hpypothyroidism    History of Present Illness   Brielle Jorge is a 73 y.o. female presents for routine follow up evaluation on hypertension and hypothyroidism. Her thyroid level is euthyroid. bp has been normotensive. Reports that bp is running 120s-140 at home but in general looks \"great. She is taking coreg daily for this with good benefit. She is feeling well and doing well. She was rx spironolactone for derm puposes (skin and hiar) but did not start yet due to feeling unwell.       The following portions of the patient's history were reviewed and updated as appropriate: allergies, current medications, past family history, past medical history, past social history, past surgical history and problem list.  Current Outpatient Prescriptions on File Prior to Visit   Medication Sig Dispense Refill   • carvedilol (COREG) 12.5 MG tablet Take 1 tablet by mouth 2 (Two) Times a Day With Meals. 180 tablet 1   • fluticasone (FLONASE) 50 MCG/ACT nasal spray USE 2 SPRAYS IN EACH NOSTRIL DAILY 16 mL 2   • guaifenesin-codeine (GUAIFENESIN AC) 100-10 MG/5ML liquid Take 5 mL by mouth 3 (Three) Times a Day As Needed for Cough. 180 mL 1   • levothyroxine (SYNTHROID, LEVOTHROID) 88 MCG tablet TAKE 1 TABLET BY MOUTH DAILY 30 tablet 1   • spironolactone (ALDACTONE) 50 MG tablet Take 1 tablet by mouth Daily. 30 tablet 5   • VENTOLIN  (90 BASE) MCG/ACT inhaler INHALE 2 PUFFS EVERY 4 (FOUR) HOURS AS NEEDED FOR WHEEZING. 18 inhaler 0   • [DISCONTINUED] doxycycline (DORYX) 100 MG enteric coated tablet Take 1 tablet by mouth 2 (Two) Times a Day. 14 tablet 0     No current facility-administered medications on file prior to visit.      Review of Systems   Constitutional: Negative.    HENT: Negative.    Eyes: Negative.    Respiratory: Negative.    Cardiovascular: Negative.    Gastrointestinal: Negative.    Endocrine: Negative.    Genitourinary: Negative.    Musculoskeletal: " Negative.    Skin:        Mild alopecia and papules on forehead   Allergic/Immunologic: Negative.    Neurological: Negative.    Hematological: Negative.    Psychiatric/Behavioral: Negative.        Objective   Physical Exam   Constitutional: She is oriented to person, place, and time. She appears well-developed and well-nourished.   HENT:   Head: Normocephalic and atraumatic.   Right Ear: External ear normal.   Left Ear: External ear normal.   Nose: Nose normal.   Mouth/Throat: Oropharynx is clear and moist.   Eyes: Conjunctivae and EOM are normal. Pupils are equal, round, and reactive to light.   Neck: Normal range of motion. Neck supple.   Cardiovascular: Normal rate, regular rhythm, normal heart sounds and intact distal pulses.    Pulmonary/Chest: Effort normal and breath sounds normal. No respiratory distress.   Abdominal: Soft. Bowel sounds are normal.   Musculoskeletal: Normal range of motion.   Neurological: She is alert and oriented to person, place, and time.   Skin: Skin is warm and dry.   Psychiatric: She has a normal mood and affect. Her behavior is normal. Judgment and thought content normal.   Nursing note and vitals reviewed.       /74  Pulse 67  Wt 58.5 kg (129 lb)  SpO2 99%  BMI 23.59 kg/m2    Assessment/Plan   Diagnoses and all orders for this visit:    Essential hypertension    Acquired hypothyroidism    Patient w/ hypertension and hypothyroidism. She will continue carvedilol for bp. Will add spironolactone. Continue synthroid for thyroid replacement. F/u routinely.

## 2018-03-12 RX ORDER — LEVOTHYROXINE SODIUM 88 UG/1
TABLET ORAL
Qty: 30 TABLET | Refills: 1 | Status: SHIPPED | OUTPATIENT
Start: 2018-03-12 | End: 2018-03-22 | Stop reason: SDUPTHER

## 2018-03-13 RX ORDER — ALBUTEROL SULFATE 90 UG/1
AEROSOL, METERED RESPIRATORY (INHALATION)
Qty: 18 INHALER | Refills: 0 | Status: SHIPPED | OUTPATIENT
Start: 2018-03-13 | End: 2018-11-26 | Stop reason: SDUPTHER

## 2018-03-22 RX ORDER — LEVOTHYROXINE SODIUM 88 UG/1
88 TABLET ORAL DAILY
Qty: 90 TABLET | Refills: 1 | Status: SHIPPED | OUTPATIENT
Start: 2018-03-22 | End: 2018-08-20 | Stop reason: SDUPTHER

## 2018-04-16 RX ORDER — FLUTICASONE PROPIONATE 50 MCG
2 SPRAY, SUSPENSION (ML) NASAL DAILY
Qty: 48 ML | Refills: 2 | Status: SHIPPED | OUTPATIENT
Start: 2018-04-16 | End: 2019-06-11 | Stop reason: SDUPTHER

## 2018-06-28 RX ORDER — CARVEDILOL 12.5 MG/1
12.5 TABLET ORAL 2 TIMES DAILY WITH MEALS
Qty: 180 TABLET | Refills: 1 | Status: SHIPPED | OUTPATIENT
Start: 2018-06-28 | End: 2018-07-02 | Stop reason: SDUPTHER

## 2018-07-02 RX ORDER — CARVEDILOL 12.5 MG/1
12.5 TABLET ORAL 2 TIMES DAILY WITH MEALS
Qty: 180 TABLET | Refills: 1 | Status: SHIPPED | OUTPATIENT
Start: 2018-07-02 | End: 2018-07-03 | Stop reason: SDUPTHER

## 2018-07-03 RX ORDER — CARVEDILOL 12.5 MG/1
12.5 TABLET ORAL 2 TIMES DAILY WITH MEALS
Qty: 180 TABLET | Refills: 1 | Status: SHIPPED | OUTPATIENT
Start: 2018-07-03 | End: 2018-08-20 | Stop reason: SDUPTHER

## 2018-07-26 ENCOUNTER — TRANSCRIBE ORDERS (OUTPATIENT)
Dept: ADMINISTRATIVE | Facility: HOSPITAL | Age: 73
End: 2018-07-26

## 2018-07-26 DIAGNOSIS — Z12.39 SCREENING BREAST EXAMINATION: Primary | ICD-10-CM

## 2018-08-13 DIAGNOSIS — Z00.00 HEALTHCARE MAINTENANCE: ICD-10-CM

## 2018-08-13 DIAGNOSIS — E03.9 HYPOTHYROIDISM, UNSPECIFIED TYPE: Primary | ICD-10-CM

## 2018-08-14 LAB
ALBUMIN SERPL-MCNC: 5 G/DL (ref 3.5–5.2)
ALBUMIN/GLOB SERPL: 2.4 G/DL
ALP SERPL-CCNC: 80 U/L (ref 39–117)
ALT SERPL-CCNC: 16 U/L (ref 1–33)
APPEARANCE UR: CLEAR
AST SERPL-CCNC: 17 U/L (ref 1–32)
BACTERIA #/AREA URNS HPF: NORMAL /HPF
BASOPHILS # BLD AUTO: 0.06 10*3/MM3 (ref 0–0.2)
BASOPHILS NFR BLD AUTO: 0.9 % (ref 0–1.5)
BILIRUB SERPL-MCNC: 0.3 MG/DL (ref 0.1–1.2)
BILIRUB UR QL STRIP: NEGATIVE
BUN SERPL-MCNC: 14 MG/DL (ref 8–23)
BUN/CREAT SERPL: 20.9 (ref 7–25)
CALCIUM SERPL-MCNC: 9.5 MG/DL (ref 8.6–10.5)
CHLORIDE SERPL-SCNC: 103 MMOL/L (ref 98–107)
CHOLEST SERPL-MCNC: 245 MG/DL (ref 0–200)
CO2 SERPL-SCNC: 28 MMOL/L (ref 22–29)
COLOR UR: YELLOW
CREAT SERPL-MCNC: 0.67 MG/DL (ref 0.57–1)
EOSINOPHIL # BLD AUTO: 0.23 10*3/MM3 (ref 0–0.7)
EOSINOPHIL NFR BLD AUTO: 3.4 % (ref 0.3–6.2)
EPI CELLS #/AREA URNS HPF: NORMAL /HPF
ERYTHROCYTE [DISTWIDTH] IN BLOOD BY AUTOMATED COUNT: 13.9 % (ref 11.7–13)
GLOBULIN SER CALC-MCNC: 2.1 GM/DL
GLUCOSE SERPL-MCNC: 100 MG/DL (ref 65–99)
GLUCOSE UR QL: NEGATIVE
HCT VFR BLD AUTO: 37.3 % (ref 35.6–45.5)
HDLC SERPL-MCNC: 70 MG/DL (ref 40–60)
HGB BLD-MCNC: 11.6 G/DL (ref 11.9–15.5)
HGB UR QL STRIP: NEGATIVE
IMM GRANULOCYTES # BLD: 0 10*3/MM3 (ref 0–0.03)
IMM GRANULOCYTES NFR BLD: 0 % (ref 0–0.5)
KETONES UR QL STRIP: NEGATIVE
LDLC SERPL CALC-MCNC: 154 MG/DL (ref 0–100)
LEUKOCYTE ESTERASE UR QL STRIP: NEGATIVE
LYMPHOCYTES # BLD AUTO: 1.51 10*3/MM3 (ref 0.9–4.8)
LYMPHOCYTES NFR BLD AUTO: 22.4 % (ref 19.6–45.3)
MCH RBC QN AUTO: 27.9 PG (ref 26.9–32)
MCHC RBC AUTO-ENTMCNC: 31.1 G/DL (ref 32.4–36.3)
MCV RBC AUTO: 89.7 FL (ref 80.5–98.2)
MICRO URNS: NORMAL
MICRO URNS: NORMAL
MONOCYTES # BLD AUTO: 0.43 10*3/MM3 (ref 0.2–1.2)
MONOCYTES NFR BLD AUTO: 6.4 % (ref 5–12)
NEUTROPHILS # BLD AUTO: 4.52 10*3/MM3 (ref 1.9–8.1)
NEUTROPHILS NFR BLD AUTO: 66.9 % (ref 42.7–76)
NITRITE UR QL STRIP: NEGATIVE
PH UR STRIP: 5.5 [PH] (ref 5–7.5)
PLATELET # BLD AUTO: 370 10*3/MM3 (ref 140–500)
POTASSIUM SERPL-SCNC: 4.3 MMOL/L (ref 3.5–5.2)
PROT SERPL-MCNC: 7.1 G/DL (ref 6–8.5)
PROT UR QL STRIP: NEGATIVE
RBC # BLD AUTO: 4.16 10*6/MM3 (ref 3.9–5.2)
RBC #/AREA URNS HPF: NORMAL /HPF
SODIUM SERPL-SCNC: 143 MMOL/L (ref 136–145)
SP GR UR: 1.01 (ref 1–1.03)
TRIGL SERPL-MCNC: 106 MG/DL (ref 0–150)
TSH SERPL DL<=0.005 MIU/L-ACNC: 2.25 MIU/ML (ref 0.27–4.2)
URINALYSIS REFLEX: NORMAL
UROBILINOGEN UR STRIP-MCNC: 0.2 MG/DL (ref 0.2–1)
VLDLC SERPL CALC-MCNC: 21.2 MG/DL (ref 5–40)
WBC # BLD AUTO: 6.75 10*3/MM3 (ref 4.5–10.7)
WBC #/AREA URNS HPF: NORMAL /HPF

## 2018-08-20 ENCOUNTER — OFFICE VISIT (OUTPATIENT)
Dept: INTERNAL MEDICINE | Facility: CLINIC | Age: 73
End: 2018-08-20

## 2018-08-20 VITALS
OXYGEN SATURATION: 99 % | SYSTOLIC BLOOD PRESSURE: 166 MMHG | HEART RATE: 74 BPM | DIASTOLIC BLOOD PRESSURE: 88 MMHG | BODY MASS INDEX: 23.59 KG/M2 | WEIGHT: 129 LBS

## 2018-08-20 DIAGNOSIS — E03.9 ACQUIRED HYPOTHYROIDISM: ICD-10-CM

## 2018-08-20 DIAGNOSIS — I10 ESSENTIAL HYPERTENSION: ICD-10-CM

## 2018-08-20 DIAGNOSIS — D64.9 ANEMIA, UNSPECIFIED TYPE: ICD-10-CM

## 2018-08-20 DIAGNOSIS — K62.9 RECTAL ABNORMALITY: ICD-10-CM

## 2018-08-20 DIAGNOSIS — R94.31 ABNORMAL EKG: ICD-10-CM

## 2018-08-20 DIAGNOSIS — R94.31 ABNORMAL ELECTROCARDIOGRAM: ICD-10-CM

## 2018-08-20 DIAGNOSIS — Z00.00 HEALTHCARE MAINTENANCE: Primary | ICD-10-CM

## 2018-08-20 DIAGNOSIS — R53.83 FATIGUE, UNSPECIFIED TYPE: ICD-10-CM

## 2018-08-20 DIAGNOSIS — M81.0 AGE RELATED OSTEOPOROSIS, UNSPECIFIED PATHOLOGICAL FRACTURE PRESENCE: ICD-10-CM

## 2018-08-20 DIAGNOSIS — E78.5 HYPERLIPIDEMIA, UNSPECIFIED HYPERLIPIDEMIA TYPE: ICD-10-CM

## 2018-08-20 PROCEDURE — 93000 ELECTROCARDIOGRAM COMPLETE: CPT | Performed by: INTERNAL MEDICINE

## 2018-08-20 PROCEDURE — 99214 OFFICE O/P EST MOD 30 MIN: CPT | Performed by: INTERNAL MEDICINE

## 2018-08-20 PROCEDURE — G0439 PPPS, SUBSEQ VISIT: HCPCS | Performed by: INTERNAL MEDICINE

## 2018-08-20 RX ORDER — LEVOTHYROXINE SODIUM 88 UG/1
88 TABLET ORAL DAILY
Qty: 90 TABLET | Refills: 2 | Status: SHIPPED | OUTPATIENT
Start: 2018-08-20 | End: 2019-06-27 | Stop reason: SDUPTHER

## 2018-08-20 RX ORDER — SPIRONOLACTONE 50 MG/1
50 TABLET, FILM COATED ORAL DAILY
Qty: 90 TABLET | Refills: 3 | Status: SHIPPED | OUTPATIENT
Start: 2018-08-20 | End: 2019-08-14 | Stop reason: SDUPTHER

## 2018-08-20 RX ORDER — CARVEDILOL 12.5 MG/1
12.5 TABLET ORAL 2 TIMES DAILY WITH MEALS
Qty: 180 TABLET | Refills: 2 | Status: SHIPPED | OUTPATIENT
Start: 2018-08-20 | End: 2019-06-27 | Stop reason: SDUPTHER

## 2018-08-20 NOTE — PROGRESS NOTES
Subjective   AWV  Fatigue  Mild anemia  Hypertension  Hypothyroidism    Dee Jorge is a 73 y.o. female who presents for an annual wellness visit.  She is doing fairly well today. She reports feeling some fatigue. She is sleeping about 8 hours every evening. She has a history of hypothyroidism and is euthyroid by lab assessment. She is mildly anemic. She has a history of iron deficiency and reports that she has recently eliminated red meat. She has hypertension and bp is elevated today. She is taking carvedilol twice daily but self d/c spironolactone which was given for both alopecia and bp regulation. Patient reports that she took her med a little later in the day than usual today.            Review of Systems   Constitutional: Positive for fatigue.   HENT: Negative.    Eyes: Negative.    Respiratory: Negative.    Cardiovascular: Negative.    Gastrointestinal: Negative.    Endocrine: Negative.    Genitourinary: Negative.    Musculoskeletal: Negative.    Skin: Negative.    Allergic/Immunologic: Negative.    Neurological: Negative.    Hematological: Negative.    Psychiatric/Behavioral: Negative.        The following portions of the patient's history were reviewed and updated as appropriate: allergies, current medications, past family history, past medical history, past social history, past surgical history and problem list.     Patient Active Problem List   Diagnosis   • Anxiety   • Hyperlipidemia   • Hypertension   • Hypothyroidism   • Pain in shoulder   • Bronchitis   • Leg edema, left   • Foot trauma   • Age-related osteoporosis without current pathological fracture       Past Medical History:   Diagnosis Date   • Acute otitis media    • Cystocele    • Fracture of patella    • Hyperlipidemia    • Hypothyroidism    • Rectocele    • Right shoulder pain        Past Surgical History:   Procedure Laterality Date   • CHOLECYSTECTOMY     • HERNIA REPAIR     • HYSTERECTOMY     • OOPHORECTOMY     • OTHER SURGICAL HISTORY       HIP REPLACEMENT, RIGHT   • REPAIR RECTOCELE         Family History   Problem Relation Age of Onset   • Hypertension Mother    • Thyroid disease Mother    • Asthma Father    • Emphysema Father    • Alcohol abuse Father    • Hypertension Father    • Thyroid disease Daughter    • Diabetes type I Daughter    • Stroke Maternal Grandfather         ischemic       Social History     Social History   • Marital status:      Spouse name: N/A   • Number of children: N/A   • Years of education: N/A     Occupational History   • Not on file.     Social History Main Topics   • Smoking status: Never Smoker   • Smokeless tobacco: Never Used   • Alcohol use No   • Drug use: Unknown   • Sexual activity: Not on file     Other Topics Concern   • Not on file     Social History Narrative   • No narrative on file       Current Outpatient Prescriptions on File Prior to Visit   Medication Sig Dispense Refill   • carvedilol (COREG) 12.5 MG tablet Take 1 tablet by mouth 2 (Two) Times a Day With Meals. 180 tablet 1   • fluticasone (FLONASE) 50 MCG/ACT nasal spray 2 sprays by Each Nare route Daily. 48 mL 2   • levothyroxine (SYNTHROID, LEVOTHROID) 88 MCG tablet Take 1 tablet by mouth Daily. 90 tablet 1   • spironolactone (ALDACTONE) 50 MG tablet Take 1 tablet by mouth Daily. 30 tablet 5   • VENTOLIN  (90 Base) MCG/ACT inhaler INHALE 2 PUFFS EVERY 4 (FOUR) HOURS AS NEEDED FOR WHEEZING. 18 inhaler 0   • [DISCONTINUED] guaifenesin-codeine (GUAIFENESIN AC) 100-10 MG/5ML liquid Take 5 mL by mouth 3 (Three) Times a Day As Needed for Cough. 180 mL 1     No current facility-administered medications on file prior to visit.        Allergies   Allergen Reactions   • Codeine    • Penicillins Hives   • Statins Myalgia   • Sulfa Antibiotics        Immunization History   Administered Date(s) Administered   • Influenza, Quadrivalent 08/31/2015   • Pneumococcal Conjugate 13-Valent (PCV13) 08/08/2016   • Pneumococcal Polysaccharide (PPSV23)  01/01/2009   • Td 07/15/2014   • Zostavax 01/01/2008       Objective     /88   Pulse 74   Wt 58.5 kg (129 lb)   SpO2 99%   BMI 23.59 kg/m²     Physical Exam   Constitutional: She is oriented to person, place, and time. She appears well-developed and well-nourished.   HENT:   Head: Normocephalic and atraumatic.   Right Ear: External ear normal.   Left Ear: External ear normal.   Nose: Nose normal.   Mouth/Throat: Oropharynx is clear and moist.   Eyes: Pupils are equal, round, and reactive to light. Conjunctivae and EOM are normal.   Neck: Normal range of motion. Neck supple.   Cardiovascular: Normal rate, regular rhythm, normal heart sounds and intact distal pulses.    Pulmonary/Chest: Effort normal and breath sounds normal.   Abdominal: Soft. Bowel sounds are normal.   Genitourinary: Vagina normal.   Genitourinary Comments: Uterus and ovaries surgically absent.   Redundant tissue around the rectum w/ erythema and discomfort. Hemoccult neg.    Musculoskeletal: Normal range of motion.   Neurological: She is alert and oriented to person, place, and time.   Skin: Skin is warm and dry.   Psychiatric: She has a normal mood and affect. Her behavior is normal. Judgment and thought content normal.   Nursing note and vitals reviewed.      Assessment/Plan   Dee was seen today for annual exam.    Diagnoses and all orders for this visit:    Healthcare maintenance    Essential hypertension  -     ECG 12 Lead    Anemia, unspecified type  -     Iron Profile  -     Vitamin B12  -     Folate  -     Ambulatory Referral to Colorectal Surgery    Age related osteoporosis, unspecified pathological fracture presence  -     Ambulatory Referral to [facility] For Infusion Treatment    Rectal abnormality  -     Ambulatory Referral to Colorectal Surgery    Hyperlipidemia, unspecified hyperlipidemia type    Acquired hypothyroidism        Discussion    AWV.      Direct observation of cognitive ability was evaluated.  Patient was given health  advice or handouts on the following:  nutrition, fall prevention, exercise. She has fatigue. Mild anemia. Will test b12 and iron and supplement as needed. Will get a heart echo as ekg is abnormal and she is having progressive fatigue. She is current on cscope but has some rectal changes and will refer to colorectal specialist given anemia. She has osteoporosis. Will refer for prolia therapy for this. She declines therapy for lipids. She will have cardiac echo and ct calcium score for further evaluation on this. She has hypertension. Will restart spironolactone given alopecia benefits as well. She will increase hydration w/ water. She is given info on DASH diet as well. She will f/u in 3 mo or prn.                   Future Appointments  Date Time Provider Department Center   8/23/2018 10:00 AM [facility]  [facility]

## 2018-08-20 NOTE — PATIENT INSTRUCTIONS
"DASH Eating Plan  DASH stands for \"Dietary Approaches to Stop Hypertension.\" The DASH eating plan is a healthy eating plan that has been shown to reduce high blood pressure (hypertension). It may also reduce your risk for type 2 diabetes, heart disease, and stroke. The DASH eating plan may also help with weight loss.  What are tips for following this plan?  General guidelines  · Avoid eating more than 2,300 mg (milligrams) of salt (sodium) a day. If you have hypertension, you may need to reduce your sodium intake to 1,500 mg a day.  · Limit alcohol intake to no more than 1 drink a day for nonpregnant women and 2 drinks a day for men. One drink equals 12 oz of beer, 5 oz of wine, or 1½ oz of hard liquor.  · Work with your health care provider to maintain a healthy body weight or to lose weight. Ask what an ideal weight is for you.  · Get at least 30 minutes of exercise that causes your heart to beat faster (aerobic exercise) most days of the week. Activities may include walking, swimming, or biking.  · Work with your health care provider or diet and nutrition specialist (dietitian) to adjust your eating plan to your individual calorie needs.  Reading food labels  · Check food labels for the amount of sodium per serving. Choose foods with less than 5 percent of the Daily Value of sodium. Generally, foods with less than 300 mg of sodium per serving fit into this eating plan.  · To find whole grains, look for the word \"whole\" as the first word in the ingredient list.  Shopping  · Buy products labeled as \"low-sodium\" or \"no salt added.\"  · Buy fresh foods. Avoid canned foods and premade or frozen meals.  Cooking  · Avoid adding salt when cooking. Use salt-free seasonings or herbs instead of table salt or sea salt. Check with your health care provider or pharmacist before using salt substitutes.  · Do not lucero foods. Cook foods using healthy methods such as baking, boiling, grilling, and broiling instead.  · Cook with " heart-healthy oils, such as olive, canola, soybean, or sunflower oil.  Meal planning    · Eat a balanced diet that includes:  ? 5 or more servings of fruits and vegetables each day. At each meal, try to fill half of your plate with fruits and vegetables.  ? Up to 6-8 servings of whole grains each day.  ? Less than 6 oz of lean meat, poultry, or fish each day. A 3-oz serving of meat is about the same size as a deck of cards. One egg equals 1 oz.  ? 2 servings of low-fat dairy each day.  ? A serving of nuts, seeds, or beans 5 times each week.  ? Heart-healthy fats. Healthy fats called Omega-3 fatty acids are found in foods such as flaxseeds and coldwater fish, like sardines, salmon, and mackerel.  · Limit how much you eat of the following:  ? Canned or prepackaged foods.  ? Food that is high in trans fat, such as fried foods.  ? Food that is high in saturated fat, such as fatty meat.  ? Sweets, desserts, sugary drinks, and other foods with added sugar.  ? Full-fat dairy products.  · Do not salt foods before eating.  · Try to eat at least 2 vegetarian meals each week.  · Eat more home-cooked food and less restaurant, buffet, and fast food.  · When eating at a restaurant, ask that your food be prepared with less salt or no salt, if possible.  What foods are recommended?  The items listed may not be a complete list. Talk with your dietitian about what dietary choices are best for you.  Grains  Whole-grain or whole-wheat bread. Whole-grain or whole-wheat pasta. Brown rice. Oatmeal. Quinoa. Bulgur. Whole-grain and low-sodium cereals. Silvana bread. Low-fat, low-sodium crackers. Whole-wheat flour tortillas.  Vegetables  Fresh or frozen vegetables (raw, steamed, roasted, or grilled). Low-sodium or reduced-sodium tomato and vegetable juice. Low-sodium or reduced-sodium tomato sauce and tomato paste. Low-sodium or reduced-sodium canned vegetables.  Fruits  All fresh, dried, or frozen fruit. Canned fruit in natural juice (without  added sugar).  Meat and other protein foods  Skinless chicken or turkey. Ground chicken or turkey. Pork with fat trimmed off. Fish and seafood. Egg whites. Dried beans, peas, or lentils. Unsalted nuts, nut butters, and seeds. Unsalted canned beans. Lean cuts of beef with fat trimmed off. Low-sodium, lean deli meat.  Dairy  Low-fat (1%) or fat-free (skim) milk. Fat-free, low-fat, or reduced-fat cheeses. Nonfat, low-sodium ricotta or cottage cheese. Low-fat or nonfat yogurt. Low-fat, low-sodium cheese.  Fats and oils  Soft margarine without trans fats. Vegetable oil. Low-fat, reduced-fat, or light mayonnaise and salad dressings (reduced-sodium). Canola, safflower, olive, soybean, and sunflower oils. Avocado.  Seasoning and other foods  Herbs. Spices. Seasoning mixes without salt. Unsalted popcorn and pretzels. Fat-free sweets.  What foods are not recommended?  The items listed may not be a complete list. Talk with your dietitian about what dietary choices are best for you.  Grains  Baked goods made with fat, such as croissants, muffins, or some breads. Dry pasta or rice meal packs.  Vegetables  Creamed or fried vegetables. Vegetables in a cheese sauce. Regular canned vegetables (not low-sodium or reduced-sodium). Regular canned tomato sauce and paste (not low-sodium or reduced-sodium). Regular tomato and vegetable juice (not low-sodium or reduced-sodium). Pickles. Olives.  Fruits  Canned fruit in a light or heavy syrup. Fried fruit. Fruit in cream or butter sauce.  Meat and other protein foods  Fatty cuts of meat. Ribs. Fried meat. Chandler. Sausage. Bologna and other processed lunch meats. Salami. Fatback. Hotdogs. Bratwurst. Salted nuts and seeds. Canned beans with added salt. Canned or smoked fish. Whole eggs or egg yolks. Chicken or turkey with skin.  Dairy  Whole or 2% milk, cream, and half-and-half. Whole or full-fat cream cheese. Whole-fat or sweetened yogurt. Full-fat cheese. Nondairy creamers. Whipped toppings.  Processed cheese and cheese spreads.  Fats and oils  Butter. Stick margarine. Lard. Shortening. Ghee. Chandler fat. Tropical oils, such as coconut, palm kernel, or palm oil.  Seasoning and other foods  Salted popcorn and pretzels. Onion salt, garlic salt, seasoned salt, table salt, and sea salt. Worcestershire sauce. Tartar sauce. Barbecue sauce. Teriyaki sauce. Soy sauce, including reduced-sodium. Steak sauce. Canned and packaged gravies. Fish sauce. Oyster sauce. Cocktail sauce. Horseradish that you find on the shelf. Ketchup. Mustard. Meat flavorings and tenderizers. Bouillon cubes. Hot sauce and Tabasco sauce. Premade or packaged marinades. Premade or packaged taco seasonings. Relishes. Regular salad dressings.  Where to find more information:  · National Heart, Lung, and Blood [place]: www.nhlbi.nih.gov  · American Heart Association: www.heart.org  Summary  · The DASH eating plan is a healthy eating plan that has been shown to reduce high blood pressure (hypertension). It may also reduce your risk for type 2 diabetes, heart disease, and stroke.  · With the DASH eating plan, you should limit salt (sodium) intake to 2,300 mg a day. If you have hypertension, you may need to reduce your sodium intake to 1,500 mg a day.  · When on the DASH eating plan, aim to eat more fresh fruits and vegetables, whole grains, lean proteins, low-fat dairy, and heart-healthy fats.  · Work with your health care provider or diet and nutrition specialist (dietitian) to adjust your eating plan to your individual calorie needs.  This information is not intended to replace advice given to you by your health care provider. Make sure you discuss any questions you have with your health care provider.  Document Released: 12/06/2012 Document Revised: 12/11/2017 Document Reviewed: 12/11/2017  [employer] Patient Education © 2018 [employer]

## 2018-08-21 LAB
FOLATE SERPL-MCNC: 11.6 NG/ML
IRON SATN MFR SERPL: 13 % (ref 15–55)
IRON SERPL-MCNC: 53 UG/DL (ref 27–139)
TIBC SERPL-MCNC: 416 UG/DL (ref 250–450)
UIBC SERPL-MCNC: 363 UG/DL (ref 118–369)
VIT B12 SERPL-MCNC: 387 PG/ML (ref 232–1245)

## 2018-08-23 ENCOUNTER — HOSPITAL ENCOUNTER (OUTPATIENT)
Dept: MAMMOGRAPHY | Facility: HOSPITAL | Age: 73
Discharge: HOME | End: 2018-08-23
Admitting: INTERNAL MEDICINE

## 2018-08-23 DIAGNOSIS — Z12.39 SCREENING BREAST EXAMINATION: ICD-10-CM

## 2018-08-23 PROCEDURE — 77067 SCR MAMMO BI INCL CAD: CPT

## 2018-08-29 ENCOUNTER — HOSPITAL ENCOUNTER (OUTPATIENT)
Dept: CARDIOLOGY | Facility: HOSPITAL | Age: 73
Discharge: HOME | End: 2018-08-29
Admitting: INTERNAL MEDICINE

## 2018-08-29 VITALS
SYSTOLIC BLOOD PRESSURE: 128 MMHG | HEIGHT: 62 IN | WEIGHT: 129 LBS | DIASTOLIC BLOOD PRESSURE: 80 MMHG | BODY MASS INDEX: 23.74 KG/M2

## 2018-08-29 DIAGNOSIS — E03.9 ACQUIRED HYPOTHYROIDISM: ICD-10-CM

## 2018-08-29 DIAGNOSIS — E78.5 HYPERLIPIDEMIA, UNSPECIFIED HYPERLIPIDEMIA TYPE: ICD-10-CM

## 2018-08-29 DIAGNOSIS — R94.31 ABNORMAL EKG: ICD-10-CM

## 2018-08-29 DIAGNOSIS — I10 ESSENTIAL HYPERTENSION: ICD-10-CM

## 2018-08-29 DIAGNOSIS — R94.31 ABNORMAL ELECTROCARDIOGRAM: ICD-10-CM

## 2018-08-29 DIAGNOSIS — R53.83 FATIGUE, UNSPECIFIED TYPE: ICD-10-CM

## 2018-08-29 LAB
ASCENDING AORTA: 3.5 CM
BH CV ECHO MEAS - ACS: 1.6 CM
BH CV ECHO MEAS - AI DEC SLOPE: 255 CM/SEC^2
BH CV ECHO MEAS - AI MAX PG: 85.7 MMHG
BH CV ECHO MEAS - AI MAX VEL: 462.9 CM/SEC
BH CV ECHO MEAS - AI P1/2T: 531.8 MSEC
BH CV ECHO MEAS - AO MAX PG (FULL): 9.3 MMHG
BH CV ECHO MEAS - AO MAX PG: 13 MMHG
BH CV ECHO MEAS - AO MEAN PG (FULL): 6.6 MMHG
BH CV ECHO MEAS - AO MEAN PG: 8.9 MMHG
BH CV ECHO MEAS - AO ROOT AREA (BSA CORRECTED): 2.1
BH CV ECHO MEAS - AO ROOT AREA: 8.5 CM^2
BH CV ECHO MEAS - AO ROOT DIAM: 3.3 CM
BH CV ECHO MEAS - AO V2 MAX: 180.3 CM/SEC
BH CV ECHO MEAS - AO V2 MEAN: 144.3 CM/SEC
BH CV ECHO MEAS - AO V2 VTI: 29.4 CM
BH CV ECHO MEAS - AVA(I,A): 1.8 CM^2
BH CV ECHO MEAS - AVA(I,D): 1.8 CM^2
BH CV ECHO MEAS - AVA(V,A): 1.6 CM^2
BH CV ECHO MEAS - AVA(V,D): 1.6 CM^2
BH CV ECHO MEAS - BSA(HAYCOCK): 1.6 M^2
BH CV ECHO MEAS - BSA: 1.6 M^2
BH CV ECHO MEAS - BZI_BMI: 23.6 KILOGRAMS/M^2
BH CV ECHO MEAS - BZI_METRIC_HEIGHT: 157.5 CM
BH CV ECHO MEAS - BZI_METRIC_WEIGHT: 58.5 KG
BH CV ECHO MEAS - EDV(MOD-SP2): 83 ML
BH CV ECHO MEAS - EDV(MOD-SP4): 95 ML
BH CV ECHO MEAS - EDV(TEICH): 188.5 ML
BH CV ECHO MEAS - EF(CUBED): 64.7 %
BH CV ECHO MEAS - EF(MOD-BP): 59 %
BH CV ECHO MEAS - EF(MOD-SP2): 57.8 %
BH CV ECHO MEAS - EF(MOD-SP4): 58.9 %
BH CV ECHO MEAS - EF(TEICH): 55.3 %
BH CV ECHO MEAS - ESV(MOD-SP2): 35 ML
BH CV ECHO MEAS - ESV(MOD-SP4): 39 ML
BH CV ECHO MEAS - ESV(TEICH): 84.2 ML
BH CV ECHO MEAS - FS: 29.3 %
BH CV ECHO MEAS - IVS/LVPW: 1.1
BH CV ECHO MEAS - IVSD: 1.2 CM
BH CV ECHO MEAS - LAT PEAK E' VEL: 11 CM/SEC
BH CV ECHO MEAS - LV DIASTOLIC VOL/BSA (35-75): 59.9 ML/M^2
BH CV ECHO MEAS - LV MASS(C)D: 300.1 GRAMS
BH CV ECHO MEAS - LV MASS(C)DI: 189.2 GRAMS/M^2
BH CV ECHO MEAS - LV MAX PG: 3.7 MMHG
BH CV ECHO MEAS - LV MEAN PG: 2.3 MMHG
BH CV ECHO MEAS - LV SYSTOLIC VOL/BSA (12-30): 24.6 ML/M^2
BH CV ECHO MEAS - LV V1 MAX: 96.5 CM/SEC
BH CV ECHO MEAS - LV V1 MEAN: 72.7 CM/SEC
BH CV ECHO MEAS - LV V1 VTI: 17.7 CM
BH CV ECHO MEAS - LVIDD: 6.1 CM
BH CV ECHO MEAS - LVIDS: 4.3 CM
BH CV ECHO MEAS - LVLD AP2: 8.4 CM
BH CV ECHO MEAS - LVLD AP4: 8.6 CM
BH CV ECHO MEAS - LVLS AP2: 6.6 CM
BH CV ECHO MEAS - LVLS AP4: 7.1 CM
BH CV ECHO MEAS - LVOT AREA (M): 3.1 CM^2
BH CV ECHO MEAS - LVOT AREA: 3.1 CM^2
BH CV ECHO MEAS - LVOT DIAM: 2 CM
BH CV ECHO MEAS - LVPWD: 1.1 CM
BH CV ECHO MEAS - MED PEAK E' VEL: 6 CM/SEC
BH CV ECHO MEAS - MV A DUR: 0.11 SEC
BH CV ECHO MEAS - MV A MAX VEL: 70.3 CM/SEC
BH CV ECHO MEAS - MV DEC SLOPE: 285.4 CM/SEC^2
BH CV ECHO MEAS - MV DEC TIME: 0.14 SEC
BH CV ECHO MEAS - MV E MAX VEL: 41.2 CM/SEC
BH CV ECHO MEAS - MV E/A: 0.59
BH CV ECHO MEAS - MV P1/2T MAX VEL: 44.1 CM/SEC
BH CV ECHO MEAS - MV P1/2T: 45.3 MSEC
BH CV ECHO MEAS - MVA P1/2T LCG: 5 CM^2
BH CV ECHO MEAS - MVA(P1/2T): 4.9 CM^2
BH CV ECHO MEAS - PA ACC TIME: 0.12 SEC
BH CV ECHO MEAS - PA MAX PG (FULL): 0.16 MMHG
BH CV ECHO MEAS - PA MAX PG: 1.9 MMHG
BH CV ECHO MEAS - PA PR(ACCEL): 23.5 MMHG
BH CV ECHO MEAS - PA V2 MAX: 69.8 CM/SEC
BH CV ECHO MEAS - PULM A REVS DUR: 0.07 SEC
BH CV ECHO MEAS - PULM A REVS VEL: 35.9 CM/SEC
BH CV ECHO MEAS - PULM DIAS VEL: 51.9 CM/SEC
BH CV ECHO MEAS - PULM S/D: 1.2
BH CV ECHO MEAS - PULM SYS VEL: 63.1 CM/SEC
BH CV ECHO MEAS - PVA(V,A): 3 CM^2
BH CV ECHO MEAS - PVA(V,D): 3 CM^2
BH CV ECHO MEAS - QP/QS: 0.8
BH CV ECHO MEAS - RAP SYSTOLE: 3 MMHG
BH CV ECHO MEAS - RV MAX PG: 1.8 MMHG
BH CV ECHO MEAS - RV MEAN PG: 1.1 MMHG
BH CV ECHO MEAS - RV V1 MAX: 66.9 CM/SEC
BH CV ECHO MEAS - RV V1 MEAN: 50.6 CM/SEC
BH CV ECHO MEAS - RV V1 VTI: 13.9 CM
BH CV ECHO MEAS - RVOT AREA: 3.1 CM^2
BH CV ECHO MEAS - RVOT DIAM: 2 CM
BH CV ECHO MEAS - RVSP: 23 MMHG
BH CV ECHO MEAS - SI(AO): 157.6 ML/M^2
BH CV ECHO MEAS - SI(CUBED): 93.6 ML/M^2
BH CV ECHO MEAS - SI(LVOT): 34.2 ML/M^2
BH CV ECHO MEAS - SI(MOD-SP2): 30.3 ML/M^2
BH CV ECHO MEAS - SI(MOD-SP4): 35.3 ML/M^2
BH CV ECHO MEAS - SI(TEICH): 65.7 ML/M^2
BH CV ECHO MEAS - SUP REN AO DIAM: 1.8 CM
BH CV ECHO MEAS - SV(AO): 250 ML
BH CV ECHO MEAS - SV(CUBED): 148.5 ML
BH CV ECHO MEAS - SV(LVOT): 54.2 ML
BH CV ECHO MEAS - SV(MOD-SP2): 48 ML
BH CV ECHO MEAS - SV(MOD-SP4): 56 ML
BH CV ECHO MEAS - SV(RVOT): 43.3 ML
BH CV ECHO MEAS - SV(TEICH): 104.3 ML
BH CV ECHO MEAS - TAPSE (>1.6): 1.7 CM2
BH CV ECHO MEAS - TR MAX VEL: 225.9 CM/SEC
BH CV ECHO MEASUREMENTS AVERAGE E/E' RATIO: 4.85
BH CV XLRA - RV BASE: 2 CM
BH CV XLRA - TDI S': 10 CM/SEC
LEFT ATRIUM VOLUME INDEX: 12 ML/M2
SINUS: 2.9 CM
STJ: 2.3 CM

## 2018-08-29 PROCEDURE — 93306 TTE W/DOPPLER COMPLETE: CPT | Performed by: INTERNAL MEDICINE

## 2018-08-29 PROCEDURE — 93306 TTE W/DOPPLER COMPLETE: CPT

## 2018-08-30 ENCOUNTER — TELEPHONE (OUTPATIENT)
Dept: INTERNAL MEDICINE | Facility: CLINIC | Age: 73
End: 2018-08-30

## 2018-08-31 RX ORDER — FERROUS SULFATE 325(65) MG
325 TABLET ORAL
Qty: 30 TABLET | Refills: 4 | Status: SHIPPED | OUTPATIENT
Start: 2018-08-31 | End: 2018-11-15

## 2018-09-06 ENCOUNTER — TELEPHONE (OUTPATIENT)
Dept: INTERNAL MEDICINE | Facility: CLINIC | Age: 73
End: 2018-09-06

## 2018-09-07 NOTE — TELEPHONE ENCOUNTER
bp readings are running 112-67s for several days now.  She will schedule the Cardiac calcium score

## 2018-09-07 NOTE — TELEPHONE ENCOUNTER
Mild-moderate aortic valve leakage. With a good cardiac output. I do not think that this is the source of her fatigue. However, I do not see her cardiac calcium score results. Has this been scheduled? If she is still experiencing fatigue we can refer to cardiology for furhter evaluation.

## 2018-09-12 ENCOUNTER — HOSPITAL ENCOUNTER (OUTPATIENT)
Dept: CT IMAGING | Facility: HOSPITAL | Age: 73
Discharge: HOME | End: 2018-09-12
Admitting: INTERNAL MEDICINE

## 2018-09-12 PROBLEM — H91.90 HEARING LOSS: Status: ACTIVE | Noted: 2018-09-12

## 2018-09-12 PROCEDURE — 75571 CT HRT W/O DYE W/CA TEST: CPT

## 2018-09-12 RX ORDER — IBUPROFEN 200 MG
200 TABLET ORAL EVERY 8 HOURS PRN
COMMUNITY
End: 2020-03-09

## 2018-09-13 DIAGNOSIS — R53.83 FATIGUE, UNSPECIFIED TYPE: Primary | ICD-10-CM

## 2018-09-13 DIAGNOSIS — R93.1 ABNORMAL SCREENING CARDIAC CT: ICD-10-CM

## 2018-09-13 DIAGNOSIS — E78.5 HYPERLIPIDEMIA, UNSPECIFIED HYPERLIPIDEMIA TYPE: ICD-10-CM

## 2018-09-14 ENCOUNTER — OFFICE VISIT (OUTPATIENT)
Dept: SURGERY | Facility: CLINIC | Age: 73
End: 2018-09-14

## 2018-09-14 VITALS
WEIGHT: 122 LBS | DIASTOLIC BLOOD PRESSURE: 80 MMHG | HEART RATE: 104 BPM | SYSTOLIC BLOOD PRESSURE: 120 MMHG | HEIGHT: 63 IN | TEMPERATURE: 98 F | OXYGEN SATURATION: 98 % | BODY MASS INDEX: 21.62 KG/M2

## 2018-09-14 DIAGNOSIS — K59.00 CONSTIPATION, UNSPECIFIED CONSTIPATION TYPE: Primary | ICD-10-CM

## 2018-09-14 DIAGNOSIS — K64.8 INTERNAL HEMORRHOIDS WITH COMPLICATION: ICD-10-CM

## 2018-09-14 PROCEDURE — 46600 DIAGNOSTIC ANOSCOPY SPX: CPT | Performed by: COLON & RECTAL SURGERY

## 2018-09-14 PROCEDURE — 99204 OFFICE O/P NEW MOD 45 MIN: CPT | Performed by: COLON & RECTAL SURGERY

## 2018-09-14 NOTE — PROGRESS NOTES
Dee Jorge is a 73 y.o. female who is seen as a consult at the request of Kacie Loving MD for anal irritiation.      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 ER   • Rotator cuff tear, right 12/05/2014    SAW DR. CHARLES BARTLETT   • Seborrheic keratosis    • Trigger finger, left middle finger 02/2016       Past Surgical History:   Procedure Laterality Date   • ANTERIOR AND POSTERIOR VAGINAL REPAIR N/A 09/21/2011    RECTOCELE AND CYSTOCELE REPAIR, PLACEMENT OF PROLIFT GRAFT, DR. LISA PALM AT [facility]   • BUNIONECTOMY Left 08/02/2010    LEFT MEDIAL EXTRA-ARTICULAR GANGLION CYST REMOVAL AND FIRST MTP CHILECTOMY WITH SYNOVECTOMY, DR. BHAVANI BABCOCK AT [facility]   • CHOLECYSTECTOMY N/A 1992    DR. QUINCY CEE   • COLONOSCOPY N/A 09/18/2015    ENTIRE COLON WNL, RESCOPE IN 10 YRS, DR. QUINCY VILLARREAL AT [place]   • COLONOSCOPY N/A 07/20/2005    WNL, DR. MAY VAZQUEZ AT [facility]   • HERNIA REPAIR  1972    DR. AIDEN ARZTAE   •  HYSTERECTOMY N/A 1984    DR. ZEKE BLANDON   • KNEE ARTHROSCOPY Right    • KNEE CARTILAGE SURGERY Right 08/21/2012    RIGHT CHONDROPLASTY OF PATELLA AND TROCHLEA, CHONDROPLASTY MEDIAL FEMORAL CONDYLE, CHONDROPLASTY LATERAL TIBIAL PLATEAU AND LATERAL FEMORAL CONDYLE, SUBTOTAL LATERAL MENISCECTOMY, OSTEOPLASTY ANTERIOR TIBIAL INTERCONDYLAR NOTCH, DR. BHAVANI BABCOCK AT [facility]   • NASAL ENDOSCOPY N/A 03/11/2015    ANTERIOR RHINOSCOPY, CONGESTION OF NASAL MUCOSA, DR. BRENDAN CHAMPION   • SALPINGO OOPHORECTOMY Right 07/30/2008    FOR ADNEXAL MASS, DR. LISA PALM AT [facility]   • TOTAL HIP ARTHROPLASTY Left 05/06/2013    DR.REID PALM AT [facility]   • TOTAL HIP ARTHROPLASTY Right 10/13/2008    DR. KELVIN TARIQ AT [place]   • TOTAL KNEE ARTHROPLASTY Left 01/20/2014    DR. GORDO PALM AT [facility]       Social History:   reports that she has quit smoking. She has never used smokeless tobacco. She reports that she does not drink alcohol or use drugs.      Marriage status:     Family History   Problem Relation Age of Onset   • Hypertension Mother    • Thyroid disease Mother    • Stroke Mother    • Asthma Mother    • Asthma Father    • Emphysema Father    • Alcohol abuse Father    • Hypertension Father    • COPD Father    • Thyroid disease Daughter    • Diabetes type I Daughter    • Stroke Maternal Grandfather         ischemic   • Kidney disease Sister    • Hypertension Sister    • [place]'s disease Grandchild    • Asthma Grandchild          Current Outpatient Prescriptions:   •  carvedilol (COREG) 12.5 MG tablet, Take 1 tablet by mouth 2 (Two) Times a Day With Meals., Disp: 180 tablet, Rfl: 2  •  cyanocobalamin ([employer]S VITAMIN B-12) 1000 MCG tablet, Take 1 tablet by mouth Daily., Disp: 90 tablet, Rfl: 2  •  fluticasone (FLONASE) 50 MCG/ACT nasal spray, 2 sprays by Each Nare route Daily., Disp: 48 mL, Rfl: 2  •  ibuprofen (ADVIL,MOTRIN) 200 MG tablet, Take 200 mg by mouth Every 8 (Eight) Hours As Needed., Disp: , Rfl:   •  levothyroxine  (SYNTHROID, LEVOTHROID) 88 MCG tablet, Take 1 tablet by mouth Daily., Disp: 90 tablet, Rfl: 2  •  Poly Fe [id number]-DF-B12 22-6-1-0.025 MG tablet, Take 1 tablet by mouth Daily., Disp: 30 tablet, Rfl: 5  •  spironolactone (ALDACTONE) 50 MG tablet, Take 1 tablet by mouth Daily., Disp: 90 tablet, Rfl: 3  •  VENTOLIN  (90 Base) MCG/ACT inhaler, INHALE 2 PUFFS EVERY 4 (FOUR) HOURS AS NEEDED FOR WHEEZING., Disp: 18 inhaler, Rfl: 0  •  ferrous sulfate 325 (65 FE) MG tablet, Take 1 tablet by mouth Daily With Breakfast., Disp: 30 tablet, Rfl: 4    Allergy  Statins; Codeine; Penicillins; and Sulfa antibiotics    Review of Systems   Musculoskeletal: Positive for arthritis.   Genitourinary: Positive for bladder incontinence.   All other systems reviewed and are negative.      Vitals:    09/14/18 0815   BP: 120/80   Pulse: 104   Temp: 98 °F (36.7 °C)   SpO2: 98%     Body mass index is 21.96 kg/m².    Physical Exam   Constitutional: She is oriented to person, place, and time. She appears well-developed and well-nourished. No distress.   HENT:   Head: Normocephalic and atraumatic.   Nose: Nose normal.   Mouth/Throat: Oropharynx is clear and moist.   Eyes: Pupils are equal, round, and reactive to light. Conjunctivae and EOM are normal.   Neck: Normal range of motion. No tracheal deviation present.   Pulmonary/Chest: Effort normal and breath sounds normal. No respiratory distress.   Abdominal: Soft. Bowel sounds are normal. She exhibits no distension.   Genitourinary:   Genitourinary Comments: Perianal exam: external hem - tags  JOSE A- adequate tone, no masses  Anoscopy performed:  Grade 2 x 1 RA internal hem other wnl     Musculoskeletal: Normal range of motion. She exhibits no edema or deformity.   Neurological: She is alert and oriented to person, place, and time. No cranial nerve deficit. Coordination and gait normal.   Skin: Skin is warm and dry.   Psychiatric: She has a normal mood and affect. Her behavior is  normal. Judgment normal.       Review of Medical Record:  I reviewed note from pcp    Assessment:  1. Constipation, unspecified constipation type    2. Internal hemorrhoids with complication        Plan:    For the constipation I recommended taking MiraLAX on a daily basis.  I gave patient written instructions on how to titrate.    For the hemorrhoids, they're nonsurgical at this point.  I recommended for patient to treat conservatively with MiraLAX and fiber

## 2018-09-27 ENCOUNTER — OFFICE VISIT (OUTPATIENT)
Dept: CARDIOLOGY | Facility: CLINIC | Age: 73
End: 2018-09-27

## 2018-09-27 VITALS
WEIGHT: 127 LBS | BODY MASS INDEX: 23.98 KG/M2 | DIASTOLIC BLOOD PRESSURE: 80 MMHG | RESPIRATION RATE: 16 BRPM | SYSTOLIC BLOOD PRESSURE: 140 MMHG | HEART RATE: 62 BPM | HEIGHT: 61 IN

## 2018-09-27 DIAGNOSIS — E78.5 HYPERLIPIDEMIA, UNSPECIFIED HYPERLIPIDEMIA TYPE: ICD-10-CM

## 2018-09-27 DIAGNOSIS — I10 ESSENTIAL HYPERTENSION: ICD-10-CM

## 2018-09-27 DIAGNOSIS — I25.10 CORONARY ARTERY DISEASE INVOLVING NATIVE HEART WITHOUT ANGINA PECTORIS, UNSPECIFIED VESSEL OR LESION TYPE: Primary | ICD-10-CM

## 2018-09-27 PROCEDURE — 99204 OFFICE O/P NEW MOD 45 MIN: CPT | Performed by: INTERNAL MEDICINE

## 2018-09-27 PROCEDURE — 93000 ELECTROCARDIOGRAM COMPLETE: CPT | Performed by: INTERNAL MEDICINE

## 2018-09-27 RX ORDER — EZETIMIBE 10 MG/1
10 TABLET ORAL DAILY
Qty: 30 TABLET | Refills: 11 | Status: SHIPPED | OUTPATIENT
Start: 2018-09-27 | End: 2019-09-24 | Stop reason: SDUPTHER

## 2018-09-27 NOTE — PROGRESS NOTES
PATIENTINFORMATION    Date of Office Visit: 2018  Encounter Provider: Jasmin Mehta MD  Place of Service: [facility] CARDIOLOGY  Patient Name: Dee Jorge  : 1945    Subjective:     Encounter Date:2018      Patient ID: Dee Jorge is a 73 y.o. female.      History of Present Illness    This is a woman whom I saw in the remote past. She has a history of hypertension and hyperlipidemia but is statin intolerant. She says she has tried about 5 different statins and cannot tolerate them. She had an echocardiogram in 2018 which showed mild asymmetric septal hypertrophy, normal LV systolic function with an ejection fraction of 59%, grade I diastolic dysfunction, calcified aortic valve with mild-to-moderate aortic regurgitation. She had a calcium score per age.     She denies any palpitations, shortness of breath, fatigue, edema or chest pain. She walks her dog a mile in the morning and frequently a mile in the evening and has no symptoms with this. She has had no change in her exercise tolerance.       Review of Systems   Constitution: Negative for fever, malaise/fatigue, weight gain and weight loss.   HENT: Negative for ear pain, hearing loss, nosebleeds and sore throat.    Eyes: Negative for double vision, pain, vision loss in left eye and vision loss in right eye.   Cardiovascular:        See history of present illness.   Respiratory: Negative for cough, shortness of breath, sleep disturbances due to breathing, snoring and wheezing.    Endocrine: Negative for cold intolerance, heat intolerance and polyuria.   Skin: Negative for itching, poor wound healing and rash.   Musculoskeletal: Negative for joint pain, joint swelling and myalgias.   Gastrointestinal: Negative for abdominal pain, diarrhea, hematochezia, nausea and vomiting.   Genitourinary: Negative for hematuria and hesitancy.   Neurological: Negative for numbness, paresthesias and seizures.  "  Psychiatric/Behavioral: Negative for depression. The patient is not nervous/anxious.            ECG 12 Lead  Date/Time: 9/27/2018 3:02 PM  Performed by: CATY MEDINA  Authorized by: CATY MEDINA   Comparison: compared with previous ECG from 8/20/2018  Similar to previous ECG  Rhythm: sinus rhythm  BPM: 62  Conduction: conduction normal  ST Segments: ST segments normal  T wave flattening noted on lead: Nonspecific ST changes.  Clinical impression: abnormal ECG               Objective:     /80 (BP Location: Right arm, Patient Position: Sitting, Cuff Size: Adult)   Pulse 62   Resp 16   Ht 154.9 cm (61\")   Wt 57.6 kg (127 lb)   BMI 24.00 kg/m²  Body mass index is 24 kg/m².     Physical Exam   Constitutional: She appears well-developed.   HENT:   Head: Normocephalic and atraumatic.   Eyes: Pupils are equal, round, and reactive to light. Conjunctivae and lids are normal. Lids are everted and swept, no foreign bodies found.   Neck: Normal range of motion. No JVD present. Carotid bruit is not present. No tracheal deviation present. No thyroid mass present.   Cardiovascular: Normal rate, regular rhythm and normal heart sounds.    Pulses:       Dorsalis pedis pulses are 2+ on the right side, and 2+ on the left side.   Pulmonary/Chest: Effort normal and breath sounds normal.   Abdominal: Normal appearance and bowel sounds are normal.   Musculoskeletal: Normal range of motion.   Neurological: She is alert. She has normal strength.   Skin: Skin is warm, dry and intact.   Psychiatric: She has a normal mood and affect. Her behavior is normal.   Vitals reviewed.          Assessment/Plan:       1. Coronary Artery Disease  Assessment  • The patient has no angina    Plan  • Lifestyle modifications discussed include adhering to a heart healthy diet, medication compliance, regular exercise and regular monitoring of cholesterol and blood pressure    Subjective - Objective  • Current antiplatelet therapy includes " aspirin 81 mg    She has no anginal symptoms.  I don't think further testing is needed.  I do think we need to be aggressive at prevention.  I'm going to try her on Zetia since she is statin intolerant.  If that does not work, I would like to investigate PCSK 9 inhibitors.    2.  Hypertension.  Generally well controlled.  She was quite anxious when she came in here  3.  Hyperlipidemia.  She is statin intolerant has tried 5 statins and failed all of them with severe muscle pains to the point that she could not walk.  I'm going to try her on Zetia.  4.  Mild-to-moderate aortic regurgitation.  I will plan on monitoring this going forward.    I will follow up with her in 3 months.  She will need lipids drawn 8 weeks after she starts Zetia.    Orders Placed This Encounter   Procedures   • Comprehensive Metabolic Panel     Standing Status:   Future     Standing Expiration Date:   9/27/2019   • Lipid Panel     Standing Status:   Future     Standing Expiration Date:   9/27/2019   • ECG 12 Lead     This order was created via procedure documentation        Discharge Medications          Accurate as of 9/27/18  4:24 PM. If you have any questions, ask your nurse or doctor.               New Medications      Instructions Start Date   ezetimibe 10 MG tablet  Commonly known as:  ZETIA  Started by:  Jasmin Mehta MD   10 mg, Oral, Daily         Continue These Medications      Instructions Start Date   carvedilol 12.5 MG tablet  Commonly known as:  COREG   12.5 mg, Oral, 2 Times Daily With Meals      cyanocobalamin 1000 MCG tablet  Commonly known as:  CVS VITAMIN B-12   1,000 mcg, Oral, Daily      ferrous sulfate 325 (65 FE) MG tablet   325 mg, Oral, Daily With Breakfast      fluticasone 50 MCG/ACT nasal spray  Commonly known as:  FLONASE   2 sprays, Each Nare, Daily      ibuprofen 200 MG tablet  Commonly known as:  ADVIL,MOTRIN   200 mg, Oral, Every 8 Hours PRN      levothyroxine 88 MCG tablet  Commonly known as:  SYNTHROID,  LEVOTHROID   88 mcg, Oral, Daily      spironolactone 50 MG tablet  Commonly known as:  ALDACTONE   50 mg, Oral, Daily      VENTOLIN  (90 Base) MCG/ACT inhaler  Generic drug:  albuterol   INHALE 2 PUFFS EVERY 4 (FOUR) HOURS AS NEEDED FOR WHEEZING.         Stop These Medications    Poly Fe Oqgxr-RaLwyJdvz-ET-B12 22-6-1-0.025 MG tablet  Stopped by:  MD Jasmin Osuna MD  09/27/18  4:24 PM

## 2018-11-15 ENCOUNTER — OFFICE VISIT (OUTPATIENT)
Dept: CARDIOLOGY | Facility: CLINIC | Age: 73
End: 2018-11-15

## 2018-11-15 VITALS
RESPIRATION RATE: 16 BRPM | SYSTOLIC BLOOD PRESSURE: 130 MMHG | DIASTOLIC BLOOD PRESSURE: 84 MMHG | HEART RATE: 73 BPM | BODY MASS INDEX: 23.98 KG/M2 | HEIGHT: 61 IN | WEIGHT: 127 LBS

## 2018-11-15 DIAGNOSIS — E78.5 HYPERLIPIDEMIA, UNSPECIFIED HYPERLIPIDEMIA TYPE: ICD-10-CM

## 2018-11-15 DIAGNOSIS — I25.10 CORONARY ARTERY DISEASE INVOLVING NATIVE CORONARY ARTERY OF NATIVE HEART WITHOUT ANGINA PECTORIS: Primary | ICD-10-CM

## 2018-11-15 DIAGNOSIS — I10 ESSENTIAL HYPERTENSION: ICD-10-CM

## 2018-11-15 DIAGNOSIS — R93.1 HIGH CORONARY ARTERY CALCIUM SCORE: ICD-10-CM

## 2018-11-15 PROCEDURE — 93000 ELECTROCARDIOGRAM COMPLETE: CPT | Performed by: INTERNAL MEDICINE

## 2018-11-15 PROCEDURE — 99214 OFFICE O/P EST MOD 30 MIN: CPT | Performed by: INTERNAL MEDICINE

## 2018-11-15 RX ORDER — LANOLIN ALCOHOL/MO/W.PET/CERES
400 CREAM (GRAM) TOPICAL DAILY
COMMUNITY
End: 2019-11-14

## 2018-11-15 NOTE — PROGRESS NOTES
PATIENTINFORMATION    Date of Office Visit: 11/15/2018  Encounter Provider: Jasmin Mehta MD  Place of Service: [facility] CARDIOLOGY  Patient Name: Dee Jorge  : 1945    Subjective:     Encounter Date:11/15/2018      Patient ID: Dee Jorge is a 73 y.o. female.      History of Present Illness    This is a woman whom I saw in the remote past and then came back to see me 18. She has a history of hypertension and hyperlipidemia but is statin intolerant. She says she has tried about 5 different statins and cannot tolerate them. She had an echocardiogram in 2018 which showed mild asymmetric septal hypertrophy, normal LV systolic function with an ejection fraction of 59%, grade I diastolic dysfunction, calcified aortic valve with mild-to-moderate aortic regurgitation. She had a calcium score of 404 on 18.     Is feeling well.  She walks her dog a mile a day most days of admit she has not for the last week or so.  She denies palpitations, shortness of breath, edema, lightheadedness, chest pain or fatigue.    Review of Systems   Constitution: Negative for fever, malaise/fatigue, weight gain and weight loss.   HENT: Negative for ear pain, hearing loss, nosebleeds and sore throat.    Eyes: Negative for double vision, pain, vision loss in left eye and vision loss in right eye.   Cardiovascular:        See history of present illness.   Respiratory: Positive for cough. Negative for shortness of breath, sleep disturbances due to breathing, snoring and wheezing.    Endocrine: Negative for cold intolerance, heat intolerance and polyuria.   Skin: Negative for itching, poor wound healing and rash.   Musculoskeletal: Negative for joint pain, joint swelling and myalgias.   Gastrointestinal: Negative for abdominal pain, diarrhea, hematochezia, nausea and vomiting.   Genitourinary: Negative for hematuria and hesitancy.   Neurological: Negative for numbness, paresthesias and  "seizures.   Psychiatric/Behavioral: Negative for depression. The patient is not nervous/anxious.            ECG 12 Lead  Date/Time: 11/15/2018 2:05 PM  Performed by: Jasmin Mehta MD  Authorized by: Jasmin Mehta MD   Comparison: compared with previous ECG from 9/27/2018  Similar to previous ECG  Rhythm: sinus rhythm  BPM: 73  Other findings: LVH with strain  Clinical impression: abnormal ECG               Objective:     /84 (BP Location: Right arm, Patient Position: Sitting, Cuff Size: Adult)   Pulse 73   Resp 16   Ht 154.9 cm (61\")   Wt 57.6 kg (127 lb)   BMI 24.00 kg/m²  Body mass index is 24 kg/m².     Physical Exam   Constitutional: She appears well-developed.   HENT:   Head: Normocephalic and atraumatic.   Eyes: Conjunctivae and lids are normal. Pupils are equal, round, and reactive to light. Lids are everted and swept, no foreign bodies found.   Neck: Normal range of motion. No JVD present. Carotid bruit is not present. No tracheal deviation present. No thyroid mass present.   Cardiovascular: Normal rate, regular rhythm and normal heart sounds.   Pulses:       Dorsalis pedis pulses are 2+ on the right side, and 2+ on the left side.   Pulmonary/Chest: Effort normal and breath sounds normal.   Abdominal: Normal appearance and bowel sounds are normal.   Musculoskeletal: Normal range of motion.   Neurological: She is alert. She has normal strength.   Skin: Skin is warm, dry and intact.   Psychiatric: She has a normal mood and affect. Her behavior is normal.   Vitals reviewed.      Lab Review: She is due for labs on Monday with Dr Loving      Assessment/Plan:       1. Coronary Artery Disease  Assessment  • The patient has no angina    Plan  • Lifestyle modifications discussed include adhering to a heart healthy diet, medication compliance, regular exercise and regular monitoring of cholesterol and blood pressure    Subjective - Objective  • Current antiplatelet therapy includes aspirin 81 " mg    Continue Zetia area and she has a very high calcium score of over 400.  I encouraged exercise and healthy diet and we discussed the symptoms that could be anginal.  2.  Hypertension.  Generally well controlled.    3.  Hyperlipidemia.  She is statin intolerant has tried 5 statins and failed all of them with severe muscle pains to the point that she could not walk. She is tolerating Zetia.  She is getting labs on Monday.   4.  Mild-to-moderate aortic regurgitation.  I will plan on monitoring this going forward.     They will see her back in one year.  I'm going to look for her blood work on Monday to make sure her cholesterol looks okay on Zetia.    Orders Placed This Encounter   Procedures   • ECG 12 Lead     This order was created via procedure documentation        Discharge Medications           Accurate as of 11/15/18  3:07 PM. If you have any questions, ask your nurse or doctor.               Continue These Medications      Instructions Start Date   carvedilol 12.5 MG tablet  Commonly known as:  COREG   12.5 mg, Oral, 2 Times Daily With Meals      cyanocobalamin 1000 MCG tablet  Commonly known as:  CVS VITAMIN B-12   1,000 mcg, Oral, Daily      ezetimibe 10 MG tablet  Commonly known as:  ZETIA   10 mg, Oral, Daily      fluticasone 50 MCG/ACT nasal spray  Commonly known as:  FLONASE   2 sprays, Each Nare, Daily      folic acid 400 MCG tablet  Commonly known as:  FOLVITE   400 mcg, Oral, Daily      ibuprofen 200 MG tablet  Commonly known as:  ADVIL,MOTRIN   200 mg, Oral, Every 8 Hours PRN      levothyroxine 88 MCG tablet  Commonly known as:  SYNTHROID, LEVOTHROID   88 mcg, Oral, Daily      spironolactone 50 MG tablet  Commonly known as:  ALDACTONE   50 mg, Oral, Daily      VENTOLIN  (90 Base) MCG/ACT inhaler  Generic drug:  albuterol   INHALE 2 PUFFS EVERY 4 (FOUR) HOURS AS NEEDED FOR WHEEZING.         Stop These Medications    ferrous sulfate 325 (65 FE) MG tablet  Stopped by:  Jasmin Mehta MD                    Jasmin Mehta MD  11/15/18  3:07 PM

## 2018-11-26 ENCOUNTER — OFFICE VISIT (OUTPATIENT)
Dept: INTERNAL MEDICINE | Facility: CLINIC | Age: 73
End: 2018-11-26

## 2018-11-26 VITALS
HEART RATE: 83 BPM | DIASTOLIC BLOOD PRESSURE: 64 MMHG | OXYGEN SATURATION: 98 % | WEIGHT: 123 LBS | SYSTOLIC BLOOD PRESSURE: 120 MMHG | BODY MASS INDEX: 23.24 KG/M2

## 2018-11-26 DIAGNOSIS — I10 ESSENTIAL HYPERTENSION: ICD-10-CM

## 2018-11-26 DIAGNOSIS — D50.9 IRON DEFICIENCY ANEMIA, UNSPECIFIED IRON DEFICIENCY ANEMIA TYPE: ICD-10-CM

## 2018-11-26 DIAGNOSIS — E78.5 HYPERLIPIDEMIA, UNSPECIFIED HYPERLIPIDEMIA TYPE: Primary | ICD-10-CM

## 2018-11-26 DIAGNOSIS — E03.9 ACQUIRED HYPOTHYROIDISM: ICD-10-CM

## 2018-11-26 PROCEDURE — 99214 OFFICE O/P EST MOD 30 MIN: CPT | Performed by: INTERNAL MEDICINE

## 2018-11-26 RX ORDER — ALBUTEROL SULFATE 90 UG/1
2 AEROSOL, METERED RESPIRATORY (INHALATION) EVERY 4 HOURS PRN
Qty: 18 INHALER | Refills: 0 | Status: SHIPPED | OUTPATIENT
Start: 2018-11-26 | End: 2019-11-07

## 2018-11-26 RX ORDER — ALBUTEROL SULFATE 90 UG/1
2 AEROSOL, METERED RESPIRATORY (INHALATION) EVERY 4 HOURS PRN
Qty: 18 INHALER | Refills: 0 | Status: SHIPPED | OUTPATIENT
Start: 2018-11-26 | End: 2018-11-26 | Stop reason: SDUPTHER

## 2018-11-26 NOTE — PROGRESS NOTES
Chief Complaint   Patient presents with   • Hypertension   • Hyperlipidemia   anemia, htn, hyperlip      History of Present Illness   Dee Jorge is a 73 y.o. female presents for routine follow up evaluation on hypertension, hyperlipidemia, and anemia. Patient started zetia. She reports one loose bm daily since starting the medication. She was on iron for anemia but took this for just 3 months. She is taking a  b12 supplement with good benefit. She reports that in general her energy is good although it wanes at the end of the day. Blood pressure per pateint has been normotensive. This morning it was 112/70. She has compared her monitor to ours and it was similar in reading. She tests at [facility] and [facility].       The following portions of the patient's history were reviewed and updated as appropriate: allergies, current medications, past family history, past medical history, past social history, past surgical history and problem list.  Current Outpatient Medications on File Prior to Visit   Medication Sig Dispense Refill   • carvedilol (COREG) 12.5 MG tablet Take 1 tablet by mouth 2 (Two) Times a Day With Meals. 180 tablet 2   • cyanocobalamin (CVS VITAMIN B-12) 1000 MCG tablet Take 1 tablet by mouth Daily. 90 tablet 2   • ezetimibe (ZETIA) 10 MG tablet Take 1 tablet by mouth Daily. 30 tablet 11   • fluticasone (FLONASE) 50 MCG/ACT nasal spray 2 sprays by Each Nare route Daily. 48 mL 2   • folic acid (FOLVITE) 400 MCG tablet Take 400 mcg by mouth Daily.     • ibuprofen (ADVIL,MOTRIN) 200 MG tablet Take 200 mg by mouth Every 8 (Eight) Hours As Needed.     • levothyroxine (SYNTHROID, LEVOTHROID) 88 MCG tablet Take 1 tablet by mouth Daily. 90 tablet 2   • spironolactone (ALDACTONE) 50 MG tablet Take 1 tablet by mouth Daily. 90 tablet 3   • [DISCONTINUED] VENTOLIN  (90 Base) MCG/ACT inhaler INHALE 2 PUFFS EVERY 4 (FOUR) HOURS AS NEEDED FOR WHEEZING. 18 inhaler 0     No current facility-administered  medications on file prior to visit.      Review of Systems   Constitutional: Negative.    HENT: Negative.    Eyes: Negative.    Respiratory: Negative.    Cardiovascular: Negative.    Gastrointestinal: Positive for diarrhea.   Endocrine: Negative.    Genitourinary: Negative.    Musculoskeletal: Negative.    Skin: Negative.    Allergic/Immunologic: Negative.    Neurological: Negative.    Hematological: Negative.    Psychiatric/Behavioral: Negative.        Objective   Physical Exam   Constitutional: She is oriented to person, place, and time. She appears well-developed and well-nourished.   HENT:   Head: Normocephalic and atraumatic.   Right Ear: External ear normal.   Left Ear: External ear normal.   Nose: Nose normal.   Mouth/Throat: Oropharynx is clear and moist.   Eyes: Conjunctivae and EOM are normal. Pupils are equal, round, and reactive to light.   Neck: Normal range of motion. Neck supple.   Cardiovascular: Normal rate, regular rhythm, normal heart sounds and intact distal pulses.   Pulmonary/Chest: Effort normal and breath sounds normal.   Abdominal: Soft. Bowel sounds are normal.   Musculoskeletal: Normal range of motion.   Neurological: She is alert and oriented to person, place, and time.   Skin: Skin is warm and dry.   Psychiatric: She has a normal mood and affect. Her behavior is normal. Judgment and thought content normal.   Nursing note and vitals reviewed.       /64   Pulse 83   Wt 55.8 kg (123 lb)   SpO2 98%   BMI 23.24 kg/m²     Assessment/Plan   Diagnoses and all orders for this visit:    Hyperlipidemia, unspecified hyperlipidemia type    Essential hypertension    Acquired hypothyroidism    Iron deficiency anemia, unspecified iron deficiency anemia type    Other orders  -     albuterol (VENTOLIN HFA) 108 (90 Base) MCG/ACT inhaler; Inhale 2 puffs Every 4 (Four) Hours As Needed for Wheezing.      Patient with dyslipidemia. She will continue zetia daily. She is advised to try a probiotic to  help with loose stooling. She will continue current medications for bp regulation. She will have lipids tested today and she will continue synthroid for her thyroid. She will maintain a low sodium and low fat diet. Anemia will be monitored. We will follow up in 8 months or prn.

## 2018-11-27 ENCOUNTER — RESULTS ENCOUNTER (OUTPATIENT)
Dept: CARDIOLOGY | Facility: CLINIC | Age: 73
End: 2018-11-27

## 2018-11-27 DIAGNOSIS — E78.5 HYPERLIPIDEMIA, UNSPECIFIED HYPERLIPIDEMIA TYPE: ICD-10-CM

## 2018-11-27 DIAGNOSIS — I25.10 CORONARY ARTERY DISEASE INVOLVING NATIVE HEART WITHOUT ANGINA PECTORIS, UNSPECIFIED VESSEL OR LESION TYPE: ICD-10-CM

## 2018-11-27 LAB
ALBUMIN SERPL-MCNC: 4.9 G/DL (ref 3.5–4.8)
ALBUMIN/GLOB SERPL: 2 {RATIO} (ref 1.2–2.2)
ALP SERPL-CCNC: 81 IU/L (ref 39–117)
ALT SERPL-CCNC: 19 IU/L (ref 0–32)
AST SERPL-CCNC: 24 IU/L (ref 0–40)
BASOPHILS # BLD AUTO: 0 X10E3/UL (ref 0–0.2)
BASOPHILS NFR BLD AUTO: 0 %
BILIRUB SERPL-MCNC: 0.6 MG/DL (ref 0–1.2)
BUN SERPL-MCNC: 18 MG/DL (ref 8–27)
BUN/CREAT SERPL: 25 (ref 12–28)
CALCIUM SERPL-MCNC: 10 MG/DL (ref 8.7–10.3)
CHLORIDE SERPL-SCNC: 101 MMOL/L (ref 96–106)
CHOLEST SERPL-MCNC: 230 MG/DL (ref 100–199)
CO2 SERPL-SCNC: 21 MMOL/L (ref 20–29)
CREAT SERPL-MCNC: 0.73 MG/DL (ref 0.57–1)
EOSINOPHIL # BLD AUTO: 0.3 X10E3/UL (ref 0–0.4)
EOSINOPHIL NFR BLD AUTO: 3 %
ERYTHROCYTE [DISTWIDTH] IN BLOOD BY AUTOMATED COUNT: 14.2 % (ref 12.3–15.4)
GLOBULIN SER CALC-MCNC: 2.5 G/DL (ref 1.5–4.5)
GLUCOSE SERPL-MCNC: 107 MG/DL (ref 65–99)
HCT VFR BLD AUTO: 35.6 % (ref 34–46.6)
HDLC SERPL-MCNC: 59 MG/DL
HGB BLD-MCNC: 11.9 G/DL (ref 11.1–15.9)
IMM GRANULOCYTES # BLD: 0 X10E3/UL (ref 0–0.1)
IMM GRANULOCYTES NFR BLD: 0 %
IRON SATN MFR SERPL: 22 % (ref 15–55)
IRON SERPL-MCNC: 93 UG/DL (ref 27–139)
LDLC SERPL CALC-MCNC: 146 MG/DL (ref 0–99)
LDLC/HDLC SERPL: 2.5 RATIO (ref 0–3.2)
LYMPHOCYTES # BLD AUTO: 1.8 X10E3/UL (ref 0.7–3.1)
LYMPHOCYTES NFR BLD AUTO: 20 %
Lab: NORMAL
MCH RBC QN AUTO: 29.6 PG (ref 26.6–33)
MCHC RBC AUTO-ENTMCNC: 33.4 G/DL (ref 31.5–35.7)
MCV RBC AUTO: 89 FL (ref 79–97)
MONOCYTES # BLD AUTO: 0.8 X10E3/UL (ref 0.1–0.9)
MONOCYTES NFR BLD AUTO: 9 %
NEUTROPHILS # BLD AUTO: 6.1 X10E3/UL (ref 1.4–7)
NEUTROPHILS NFR BLD AUTO: 68 %
PLATELET # BLD AUTO: 430 X10E3/UL (ref 150–379)
POTASSIUM SERPL-SCNC: 4.5 MMOL/L (ref 3.5–5.2)
PROT SERPL-MCNC: 7.4 G/DL (ref 6–8.5)
RBC # BLD AUTO: 4.02 X10E6/UL (ref 3.77–5.28)
SODIUM SERPL-SCNC: 140 MMOL/L (ref 134–144)
TIBC SERPL-MCNC: 423 UG/DL (ref 250–450)
TRIGL SERPL-MCNC: 127 MG/DL (ref 0–149)
UIBC SERPL-MCNC: 330 UG/DL (ref 118–369)
VIT B12 SERPL-MCNC: 451 PG/ML (ref 232–1245)
VLDLC SERPL CALC-MCNC: 25 MG/DL (ref 5–40)
WBC # BLD AUTO: 9 X10E3/UL (ref 3.4–10.8)

## 2018-11-27 PROCEDURE — G0008 ADMIN INFLUENZA VIRUS VAC: HCPCS | Performed by: INTERNAL MEDICINE

## 2018-11-27 PROCEDURE — 90732 PPSV23 VACC 2 YRS+ SUBQ/IM: CPT | Performed by: INTERNAL MEDICINE

## 2018-11-27 PROCEDURE — G0009 ADMIN PNEUMOCOCCAL VACCINE: HCPCS | Performed by: INTERNAL MEDICINE

## 2018-11-27 PROCEDURE — 90662 IIV NO PRSV INCREASED AG IM: CPT | Performed by: INTERNAL MEDICINE

## 2018-12-17 ENCOUNTER — TELEPHONE (OUTPATIENT)
Dept: INTERNAL MEDICINE | Facility: CLINIC | Age: 73
End: 2018-12-17

## 2018-12-17 NOTE — TELEPHONE ENCOUNTER
Called and left message for patient. She should continue zetia. Modest lipid benefit. She should repeat lipid, cmp, and cbc in 4 months. Low fat diet w/ routine fitness.

## 2019-06-11 RX ORDER — FLUTICASONE PROPIONATE 50 MCG
2 SPRAY, SUSPENSION (ML) NASAL DAILY
Qty: 48 ML | Refills: 0 | Status: SHIPPED | OUTPATIENT
Start: 2019-06-11 | End: 2019-09-19 | Stop reason: SDUPTHER

## 2019-06-28 RX ORDER — LEVOTHYROXINE SODIUM 88 UG/1
TABLET ORAL
Qty: 90 TABLET | Refills: 2 | Status: SHIPPED | OUTPATIENT
Start: 2019-06-28 | End: 2019-06-28 | Stop reason: SDUPTHER

## 2019-06-28 RX ORDER — CARVEDILOL 12.5 MG/1
TABLET ORAL
Qty: 180 TABLET | Refills: 2 | Status: SHIPPED | OUTPATIENT
Start: 2019-06-28 | End: 2019-06-28 | Stop reason: SDUPTHER

## 2019-06-28 RX ORDER — CARVEDILOL 12.5 MG/1
12.5 TABLET ORAL 2 TIMES DAILY WITH MEALS
Qty: 180 TABLET | Refills: 2 | Status: SHIPPED | OUTPATIENT
Start: 2019-06-28 | End: 2020-07-05

## 2019-06-28 RX ORDER — LEVOTHYROXINE SODIUM 88 UG/1
88 TABLET ORAL DAILY
Qty: 90 TABLET | Refills: 2 | Status: SHIPPED | OUTPATIENT
Start: 2019-06-28 | End: 2020-08-30

## 2019-07-08 ENCOUNTER — TRANSCRIBE ORDERS (OUTPATIENT)
Dept: ADMINISTRATIVE | Facility: HOSPITAL | Age: 74
End: 2019-07-08

## 2019-07-08 DIAGNOSIS — Z12.31 SCREENING MAMMOGRAM, ENCOUNTER FOR: Primary | ICD-10-CM

## 2019-08-15 DIAGNOSIS — E03.9 ACQUIRED HYPOTHYROIDISM: ICD-10-CM

## 2019-08-15 DIAGNOSIS — E78.5 HYPERLIPIDEMIA, UNSPECIFIED HYPERLIPIDEMIA TYPE: Primary | ICD-10-CM

## 2019-08-15 DIAGNOSIS — I10 ESSENTIAL HYPERTENSION: ICD-10-CM

## 2019-08-15 DIAGNOSIS — Z00.00 HEALTHCARE MAINTENANCE: ICD-10-CM

## 2019-08-15 RX ORDER — SPIRONOLACTONE 50 MG/1
TABLET, FILM COATED ORAL
Qty: 90 TABLET | Refills: 3 | Status: SHIPPED | OUTPATIENT
Start: 2019-08-15 | End: 2019-08-22 | Stop reason: SDUPTHER

## 2019-08-22 RX ORDER — SPIRONOLACTONE 50 MG/1
50 TABLET, FILM COATED ORAL DAILY
Qty: 90 TABLET | Refills: 3 | Status: SHIPPED | OUTPATIENT
Start: 2019-08-22 | End: 2021-01-07

## 2019-08-23 LAB
ALBUMIN SERPL-MCNC: 4.9 G/DL (ref 3.5–5.2)
ALBUMIN/GLOB SERPL: 2.6 G/DL
ALP SERPL-CCNC: 58 U/L (ref 39–117)
ALT SERPL-CCNC: 14 U/L (ref 1–33)
APPEARANCE UR: CLEAR
AST SERPL-CCNC: 20 U/L (ref 1–32)
BACTERIA #/AREA URNS HPF: NORMAL /HPF
BASOPHILS # BLD AUTO: 0.06 10*3/MM3 (ref 0–0.2)
BASOPHILS NFR BLD AUTO: 0.6 % (ref 0–1.5)
BILIRUB SERPL-MCNC: 0.3 MG/DL (ref 0.2–1.2)
BILIRUB UR QL STRIP: NEGATIVE
BUN SERPL-MCNC: 21 MG/DL (ref 8–23)
BUN/CREAT SERPL: 27.3 (ref 7–25)
CALCIUM SERPL-MCNC: 9.6 MG/DL (ref 8.6–10.5)
CHLORIDE SERPL-SCNC: 103 MMOL/L (ref 98–107)
CHOLEST SERPL-MCNC: 207 MG/DL (ref 0–200)
CO2 SERPL-SCNC: 23.7 MMOL/L (ref 22–29)
COLOR UR: YELLOW
CREAT SERPL-MCNC: 0.77 MG/DL (ref 0.57–1)
EOSINOPHIL # BLD AUTO: 0.26 10*3/MM3 (ref 0–0.4)
EOSINOPHIL NFR BLD AUTO: 2.7 % (ref 0.3–6.2)
EPI CELLS #/AREA URNS HPF: NORMAL /HPF
ERYTHROCYTE [DISTWIDTH] IN BLOOD BY AUTOMATED COUNT: 13.7 % (ref 12.3–15.4)
GLOBULIN SER CALC-MCNC: 1.9 GM/DL
GLUCOSE SERPL-MCNC: 93 MG/DL (ref 65–99)
GLUCOSE UR QL: NEGATIVE
HCT VFR BLD AUTO: 34.5 % (ref 34–46.6)
HDLC SERPL-MCNC: 52 MG/DL (ref 40–60)
HGB BLD-MCNC: 11.1 G/DL (ref 12–15.9)
HGB UR QL STRIP: NEGATIVE
IMM GRANULOCYTES # BLD AUTO: 0.03 10*3/MM3 (ref 0–0.05)
IMM GRANULOCYTES NFR BLD AUTO: 0.3 % (ref 0–0.5)
KETONES UR QL STRIP: NEGATIVE
LDLC SERPL CALC-MCNC: 130 MG/DL (ref 0–100)
LEUKOCYTE ESTERASE UR QL STRIP: NEGATIVE
LYMPHOCYTES # BLD AUTO: 1.49 10*3/MM3 (ref 0.7–3.1)
LYMPHOCYTES NFR BLD AUTO: 15.2 % (ref 19.6–45.3)
MCH RBC QN AUTO: 30.3 PG (ref 26.6–33)
MCHC RBC AUTO-ENTMCNC: 32.2 G/DL (ref 31.5–35.7)
MCV RBC AUTO: 94.3 FL (ref 79–97)
MICRO URNS: NORMAL
MICRO URNS: NORMAL
MONOCYTES # BLD AUTO: 0.58 10*3/MM3 (ref 0.1–0.9)
MONOCYTES NFR BLD AUTO: 5.9 % (ref 5–12)
MUCOUS THREADS URNS QL MICRO: PRESENT /HPF
NEUTROPHILS # BLD AUTO: 7.39 10*3/MM3 (ref 1.7–7)
NEUTROPHILS NFR BLD AUTO: 75.3 % (ref 42.7–76)
NITRITE UR QL STRIP: NEGATIVE
NRBC BLD AUTO-RTO: 0 /100 WBC (ref 0–0.2)
PH UR STRIP: 5.5 [PH] (ref 5–7.5)
PLATELET # BLD AUTO: 410 10*3/MM3 (ref 140–450)
POTASSIUM SERPL-SCNC: 4.4 MMOL/L (ref 3.5–5.2)
PROT SERPL-MCNC: 6.8 G/DL (ref 6–8.5)
PROT UR QL STRIP: NEGATIVE
RBC # BLD AUTO: 3.66 10*6/MM3 (ref 3.77–5.28)
RBC #/AREA URNS HPF: NORMAL /HPF
SODIUM SERPL-SCNC: 139 MMOL/L (ref 136–145)
SP GR UR: 1.01 (ref 1–1.03)
TRIGL SERPL-MCNC: 124 MG/DL (ref 0–150)
TSH SERPL DL<=0.005 MIU/L-ACNC: 1.03 MIU/ML (ref 0.27–4.2)
URINALYSIS REFLEX: NORMAL
UROBILINOGEN UR STRIP-MCNC: 0.2 MG/DL (ref 0.2–1)
VLDLC SERPL CALC-MCNC: 24.8 MG/DL
WBC # BLD AUTO: 9.81 10*3/MM3 (ref 3.4–10.8)
WBC #/AREA URNS HPF: NORMAL /HPF

## 2019-08-26 ENCOUNTER — OFFICE VISIT (OUTPATIENT)
Dept: INTERNAL MEDICINE | Facility: CLINIC | Age: 74
End: 2019-08-26

## 2019-08-26 VITALS
HEART RATE: 68 BPM | OXYGEN SATURATION: 98 % | RESPIRATION RATE: 15 BRPM | BODY MASS INDEX: 23.37 KG/M2 | SYSTOLIC BLOOD PRESSURE: 150 MMHG | DIASTOLIC BLOOD PRESSURE: 60 MMHG | HEIGHT: 62 IN | WEIGHT: 127 LBS | TEMPERATURE: 98.3 F

## 2019-08-26 DIAGNOSIS — E03.9 ACQUIRED HYPOTHYROIDISM: ICD-10-CM

## 2019-08-26 DIAGNOSIS — I10 ESSENTIAL HYPERTENSION: ICD-10-CM

## 2019-08-26 DIAGNOSIS — D64.9 ANEMIA, UNSPECIFIED TYPE: Primary | ICD-10-CM

## 2019-08-26 DIAGNOSIS — E78.5 HYPERLIPIDEMIA, UNSPECIFIED HYPERLIPIDEMIA TYPE: ICD-10-CM

## 2019-08-26 DIAGNOSIS — Z00.00 HEALTHCARE MAINTENANCE: ICD-10-CM

## 2019-08-26 PROCEDURE — G0439 PPPS, SUBSEQ VISIT: HCPCS | Performed by: INTERNAL MEDICINE

## 2019-08-26 PROCEDURE — 99214 OFFICE O/P EST MOD 30 MIN: CPT | Performed by: INTERNAL MEDICINE

## 2019-08-26 NOTE — PATIENT INSTRUCTIONS
Medicare Wellness  Personal Prevention Plan of Service     Date of Office Visit:  2019  Encounter Provider:  Kacie Loving MD  Place of Service:  [facility] INTER[facility]  Patient Name: Dee Jorge  :  1945    As part of the Medicare Wellness portion of your visit today, we are providing you with this personalized preventive plan of services (PPPS). This plan is based upon recommendations of the [place] Preventive Services Task Force (USPSTF) and the Advisory Committee on Immunization Practices (ACIP).    This lists the preventive care services that should be considered, and provides dates of when you are due. Items listed as completed are up-to-date and do not require any further intervention.    Health Maintenance   Topic Date Due   • ZOSTER VACCINE (2 of 3) 2008   • TDAP/TD VACCINES (1 - Tdap) 2014   • MEDICARE ANNUAL WELLNESS  2019   • DXA SCAN  2019   • INFLUENZA VACCINE  2019   • LIPID PANEL  2020   • MAMMOGRAM  2020   • COLONOSCOPY  2025   • PNEUMOCOCCAL VACCINES (65+ LOW/MEDIUM RISK)  Completed       No orders of the defined types were placed in this encounter.      Return in about 6 months (around 2020) for Recheck.

## 2019-08-26 NOTE — PROGRESS NOTES
Subjective   AWV  Right arm pain  Hypothyroidism  Dyslipidemia  Hypertension    Dee Jorge is a 74 y.o. female who presents for an annual wellness visit, to follow up on chronic issues, and to discuss acute concerns. She reports that she recently injured her right arm lifting a heavy pot at [employer]. This was several months ago and she will still have sensation of pain with driving and other activities.   Has a history of hypertension. Recently feeling hypotensive w/ fatigue. She has hyperlipidemia. Taking zetia. This does stimulate her bowels but she is tolerating the medication. Very good ldl response to the medication. She is following w/ cardiology as well. No recent chest pain or shortness of breath.         Review of Systems   Constitutional: Negative.    HENT: Negative.    Eyes: Negative.    Respiratory: Negative.    Cardiovascular: Negative.    Gastrointestinal: Negative.    Endocrine: Negative.    Genitourinary: Negative.    Musculoskeletal: Negative.         Right arm pain.    Skin: Negative.    Allergic/Immunologic: Negative.    Neurological: Negative.    Hematological: Negative.    Psychiatric/Behavioral: Negative.        The following portions of the patient's history were reviewed and updated as appropriate: allergies, current medications, past family history, past medical history, past social history, past surgical history and problem list.     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 CHARLES BARTLETT   • Seborrheic keratosis    • Trigger finger, left middle finger 02/2016       Past Surgical History:   Procedure Laterality Date   • ANTERIOR AND POSTERIOR VAGINAL REPAIR N/A 09/21/2011    RECTOCELE AND CYSTOCELE REPAIR, PLACEMENT OF PROLIFT GRAFT, DR. LISA PALM AT [facility]   • BUNIONECTOMY Left 08/02/2010    LEFT MEDIAL EXTRA-ARTICULAR GANGLION CYST REMOVAL AND FIRST MTP CHILECTOMY WITH SYNOVECTOMY, DR. BHAVANI BABCOCK AT [facility]   • CHOLECYSTECTOMY N/A 1992    DR. QUINCY CEE   • COLONOSCOPY N/A 09/18/2015    ENTIRE COLON WNL, RESCOPE IN 10 YRS, DR. QUINCY VILLARREAL AT [facility]   • COLONOSCOPY N/A 07/20/2005    WNL, DR. MAY VAZQUEZ AT [facility]   • HERNIA REPAIR  1972    DR. AIDEN ARZATE   • HYSTERECTOMY N/A 1984    DR. ZEKE BLANDON   • KNEE ARTHROSCOPY Right    • KNEE CARTILAGE SURGERY Right 08/21/2012    RIGHT CHONDROPLASTY OF PATELLA AND TROCHLEA, CHONDROPLASTY MEDIAL FEMORAL CONDYLE, CHONDROPLASTY LATERAL TIBIAL PLATEAU AND LATERAL FEMORAL CONDYLE, SUBTOTAL LATERAL MENISCECTOMY, OSTEOPLASTY ANTERIOR TIBIAL INTERCONDYLAR NOTCH, DR. BHAVANI BABCOCK AT [facility]   • NASAL ENDOSCOPY N/A 03/11/2015    ANTERIOR RHINOSCOPY, CONGESTION OF NASAL MUCOSA, DR. BRENDAN CHAMPION   • SALPINGO OOPHORECTOMY Right 07/30/2008    FOR ADNEXAL MASS, DR. LISA PALM AT [facility]   • TOTAL HIP ARTHROPLASTY Left 05/06/2013    DR.REID PALM AT [facility]   • TOTAL HIP ARTHROPLASTY Right 10/13/2008    DR. KELVIN TARIQ AT [facility]   • TOTAL KNEE ARTHROPLASTY Left 01/20/2014      GORDO PALM AT [facility]       Family History   Problem Relation Age of Onset   • Hypertension Mother    • Thyroid disease Mother    • Stroke Mother    • Asthma Mother    • Asthma Father    • Emphysema Father    • Alcohol abuse Father    • Hypertension Father    • COPD Father    • Thyroid disease Daughter    • Diabetes type I Daughter    • Stroke Maternal Grandfather         ischemic   • Kidney disease Sister    • Hypertension Sister    • Corea's disease Grandchild    • Asthma Grandchild        Social History     Socioeconomic History   • Marital status:      Spouse name: Not on file   • Number of children: Not on file   • Years of education: Not on file   • Highest education level: Not on file   Tobacco Use   • Smoking status: Former Smoker   • Smokeless tobacco: Never Used   • Tobacco comment: QUIT > 50 YRS AGO   Substance and Sexual Activity   • Alcohol use: No     Comment: daily caffiene   • Drug use: No   • Sexual activity: Defer     Birth control/protection: Post-menopausal, Surgical       Current Outpatient Medications on File Prior to Visit   Medication Sig Dispense Refill   • albuterol (VENTOLIN HFA) 108 (90 Base) MCG/ACT inhaler Inhale 2 puffs Every 4 (Four) Hours As Needed for Wheezing. 18 inhaler 0   • brompheniramine-pseudoephedrine-DM 30-2-10 MG/5ML syrup Take 5 mL by mouth 4 (Four) Times a Day As Needed for Allergies. 180 mL 0   • carvedilol (COREG) 12.5 MG tablet Take 1 tablet by mouth 2 (Two) Times a Day With Meals. 180 tablet 2   • cyanocobalamin ([employer] VITAMIN B-12) 1000 MCG tablet Take 1 tablet by mouth Daily. 90 tablet 2   • ezetimibe (ZETIA) 10 MG tablet Take 1 tablet by mouth Daily. 30 tablet 11   • fluticasone (FLONASE) 50 MCG/ACT nasal spray 2 SPRAYS BY EACH NARE ROUTE DAILY. 48 mL 0   • folic acid (FOLVITE) 400 MCG tablet Take 400 mcg by mouth Daily.     • ibuprofen (ADVIL,MOTRIN) 200 MG tablet Take 200 mg by mouth Every 8 (Eight) Hours As Needed.     • levothyroxine (SYNTHROID, LEVOTHROID)  "88 MCG tablet Take 1 tablet by mouth Daily. 90 tablet 2   • spironolactone (ALDACTONE) 50 MG tablet Take 1 tablet by mouth Daily. 90 tablet 3   • [DISCONTINUED] methylPREDNISolone (MEDROL, NEO,) 4 MG tablet Take as directed on package instructions. 1 each 0     No current facility-administered medications on file prior to visit.        Allergies   Allergen Reactions   • Statins Myalgia   • Codeine Nausea And Vomiting   • Penicillins Hives   • Sulfa Antibiotics Nausea And Vomiting and Other (See Comments)     HEADACHES       Immunization History   Administered Date(s) Administered   • Flu Mist 08/31/2015   • Flu Vaccine High Dose PF 65YR+ 11/27/2018   • Pneumococcal Conjugate 13-Valent (PCV13) 08/08/2016   • Pneumococcal Polysaccharide (PPSV23) 01/01/2009, 11/27/2018   • Td 07/15/2014   • Zostavax 01/01/2008       Objective     /60   Pulse 68   Temp 98.3 °F (36.8 °C) (Oral)   Resp 15   Ht 157.5 cm (62.01\")   Wt 57.6 kg (127 lb)   SpO2 98%   BMI 23.22 kg/m²     Physical Exam   Constitutional: She is oriented to person, place, and time. She appears well-developed and well-nourished.   HENT:   Head: Normocephalic and atraumatic.   Right Ear: External ear normal.   Left Ear: External ear normal.   Nose: Nose normal.   Mouth/Throat: Oropharynx is clear and moist.   Eyes: Conjunctivae and EOM are normal. Pupils are equal, round, and reactive to light.   Neck: Normal range of motion. Neck supple.   Cardiovascular: Normal rate, regular rhythm and normal heart sounds.   Pulmonary/Chest: Effort normal and breath sounds normal. No respiratory distress.   Abdominal: Soft. Bowel sounds are normal.   Musculoskeletal: Normal range of motion.   Neurological: She is alert and oriented to person, place, and time.   Skin: Skin is warm and dry.   Psychiatric: She has a normal mood and affect. Her behavior is normal. Judgment and thought content normal.   Nursing note and vitals reviewed.      Assessment/Plan   Dee was " seen today for annual exam.    Diagnoses and all orders for this visit:    Healthcare maintenance    Acquired hypothyroidism    Essential hypertension    Hyperlipidemia, unspecified hyperlipidemia type    Other orders  -     diclofenac (FLECTOR) 1.3 % patch patch; Apply 1 patch topically to the appropriate area as directed 2 (Two) Times a Day.        Discussion    AWV.     Direct observation of cognitive ability was evaluated and is normal. Patient was given health advice or handouts on the following:  nutrition, fall prevention, exercise, weight management.   Patient with hypertension, hypothyroidism, and hyperlipidemia. She will continue current medications. She feels some afternoon fatigue and will check her bp when this occurs. She will ensure that she is eating regularly as she may be experiencing hypoglycemia. She may need a snack in the afternoon. She will start a multi with iron for slight anemia. Will repeat blood counts in 3 weeks w/ iron and b12 measurements. She will f/u routinely.              Future Appointments   Date Time Provider Department Center   9/9/2019  9:30 AM [facility]   9/16/2019 10:40 AM [facility] None   11/14/2019  2:00 PM Jasmin Mehta MD [facility] None   3/2/2020  8:30 AM Kacie Loving MD [facility] None

## 2019-08-27 ENCOUNTER — TELEPHONE (OUTPATIENT)
Dept: INTERNAL MEDICINE | Facility: CLINIC | Age: 74
End: 2019-08-27

## 2019-08-27 NOTE — TELEPHONE ENCOUNTER
Asking if she can still get pain patches discussed yesterday sent to [facility] pharmacy. Thanks

## 2019-09-09 ENCOUNTER — HOSPITAL ENCOUNTER (OUTPATIENT)
Dept: MAMMOGRAPHY | Facility: HOSPITAL | Age: 74
Discharge: HOME | End: 2019-09-09
Admitting: INTERNAL MEDICINE

## 2019-09-09 DIAGNOSIS — Z12.31 SCREENING MAMMOGRAM, ENCOUNTER FOR: ICD-10-CM

## 2019-09-09 PROCEDURE — 77067 SCR MAMMO BI INCL CAD: CPT

## 2019-09-16 ENCOUNTER — FLU SHOT (OUTPATIENT)
Dept: INTERNAL MEDICINE | Facility: CLINIC | Age: 74
End: 2019-09-16

## 2019-09-16 DIAGNOSIS — Z23 IMMUNIZATION, PNEUMOCOCCUS AND INFLUENZA: ICD-10-CM

## 2019-09-16 LAB
BASOPHILS # BLD AUTO: 0.05 10*3/MM3 (ref 0–0.2)
BASOPHILS NFR BLD AUTO: 0.7 % (ref 0–1.5)
EOSINOPHIL # BLD AUTO: 0.33 10*3/MM3 (ref 0–0.4)
EOSINOPHIL NFR BLD AUTO: 4.5 % (ref 0.3–6.2)
ERYTHROCYTE [DISTWIDTH] IN BLOOD BY AUTOMATED COUNT: 13.2 % (ref 12.3–15.4)
HCT VFR BLD AUTO: 33.2 % (ref 34–46.6)
HGB BLD-MCNC: 11 G/DL (ref 12–15.9)
IMM GRANULOCYTES # BLD AUTO: 0.03 10*3/MM3 (ref 0–0.05)
IMM GRANULOCYTES NFR BLD AUTO: 0.4 % (ref 0–0.5)
IRON SATN MFR SERPL: 20 % (ref 20–50)
IRON SERPL-MCNC: 94 MCG/DL (ref 37–145)
LYMPHOCYTES # BLD AUTO: 1.84 10*3/MM3 (ref 0.7–3.1)
LYMPHOCYTES NFR BLD AUTO: 25.1 % (ref 19.6–45.3)
MCH RBC QN AUTO: 31.2 PG (ref 26.6–33)
MCHC RBC AUTO-ENTMCNC: 33.1 G/DL (ref 31.5–35.7)
MCV RBC AUTO: 94.1 FL (ref 79–97)
MONOCYTES # BLD AUTO: 0.6 10*3/MM3 (ref 0.1–0.9)
MONOCYTES NFR BLD AUTO: 8.2 % (ref 5–12)
NEUTROPHILS # BLD AUTO: 4.47 10*3/MM3 (ref 1.7–7)
NEUTROPHILS NFR BLD AUTO: 61.1 % (ref 42.7–76)
NRBC BLD AUTO-RTO: 0 /100 WBC (ref 0–0.2)
PLATELET # BLD AUTO: 419 10*3/MM3 (ref 140–450)
RBC # BLD AUTO: 3.53 10*6/MM3 (ref 3.77–5.28)
TIBC SERPL-MCNC: 473 MCG/DL
UIBC SERPL-MCNC: 379 MCG/DL (ref 112–346)
VIT B12 SERPL-MCNC: 460 PG/ML (ref 211–946)
WBC # BLD AUTO: 7.32 10*3/MM3 (ref 3.4–10.8)

## 2019-09-16 PROCEDURE — 90653 IIV ADJUVANT VACCINE IM: CPT | Performed by: INTERNAL MEDICINE

## 2019-09-16 PROCEDURE — G0008 ADMIN INFLUENZA VIRUS VAC: HCPCS | Performed by: INTERNAL MEDICINE

## 2019-09-18 DIAGNOSIS — D50.9 IRON DEFICIENCY ANEMIA, UNSPECIFIED IRON DEFICIENCY ANEMIA TYPE: Primary | ICD-10-CM

## 2019-09-19 RX ORDER — FLUTICASONE PROPIONATE 50 MCG
2 SPRAY, SUSPENSION (ML) NASAL DAILY
Qty: 48 ML | Refills: 0 | Status: SHIPPED | OUTPATIENT
Start: 2019-09-19 | End: 2019-11-07

## 2019-09-25 RX ORDER — EZETIMIBE 10 MG/1
TABLET ORAL
Qty: 90 TABLET | Refills: 3 | Status: SHIPPED | OUTPATIENT
Start: 2019-09-25 | End: 2019-11-14

## 2019-10-04 DIAGNOSIS — Z80.0 FAMILY HISTORY OF COLON CANCER: ICD-10-CM

## 2019-10-04 DIAGNOSIS — D64.9 ANEMIA, UNSPECIFIED TYPE: Primary | ICD-10-CM

## 2019-10-04 DIAGNOSIS — R19.5 POSITIVE OCCULT STOOL BLOOD TEST: ICD-10-CM

## 2019-11-06 NOTE — PROGRESS NOTES
"Dee Jorge is a 74 y.o. female in for follow up of Occult blood positive stool    Anemia, unspecified type    Patient had positive Hemoccult 10/4/2019 per PCP Dr. Inder Loving has also been working up anemia, with labs in September showing H/H 11.0/33.2    Pt states she feels that the blood is probably from the hemorrhoids.  She has not noted any blood in her stools  She states she has loose stools from Zetia  She does not take any fiber supplements, imodium, or pepto    She does not use any creams on her bottom  She denies anorectal pain    No change in her weight  She denies low energy    Most recent colonoscopy 2015 Dr. Kirkland: wnl    /64 (BP Location: Left arm, Patient Position: Sitting, Cuff Size: Adult)   Pulse 84   Temp 98.2 °F (36.8 °C) (Oral)   Ht 157.5 cm (62\")   Wt 58 kg (127 lb 12.8 oz)   LMP  (LMP Unknown)   SpO2 98%   Breastfeeding? No   BMI 23.37 kg/m²   Body mass index is 23.37 kg/m².      PE:  Physical Exam   Constitutional: She is oriented to person, place, and time. She appears well-developed and well-nourished. No distress.   HENT:   Head: Normocephalic and atraumatic.   Eyes: Pupils are equal, round, and reactive to light.   Neck: No tracheal deviation present.   Pulmonary/Chest: Effort normal. No respiratory distress.   Abdominal: She exhibits no distension.   Genitourinary:   Genitourinary Comments: Perianal exam: external hem - slightly enlarged  JOSE A- decreased tone, no masses  Anoscopy performed: wnl internal hem   Neurological: She is alert and oriented to person, place, and time.   Skin: Skin is warm and dry. She is not diaphoretic.   Psychiatric: She has a normal mood and affect. Her behavior is normal.   Vitals reviewed.    H/H September 2019 11.0/33.2    Assessment:   1. Occult blood positive stool    2. Anemia, unspecified type         Plan:    For anemia and positive hemoccult, I recommend doing a colonoscopy with Dr. Zuluaga.  I described the patient risks, " benefits, and alternatives, and she wishes to proceed.    To help bulk her stools, I recommend fiber therapy and detailed and gave instructions on how to achieve a high fiber diet.      Rosibel Velazco PA-C  11/7/2019     30 minutes spent face-to-face with patient; 20 of 30 minutes spent in consultation

## 2019-11-07 ENCOUNTER — OFFICE VISIT (OUTPATIENT)
Dept: SURGERY | Facility: CLINIC | Age: 74
End: 2019-11-07

## 2019-11-07 VITALS
HEIGHT: 62 IN | DIASTOLIC BLOOD PRESSURE: 64 MMHG | OXYGEN SATURATION: 98 % | HEART RATE: 84 BPM | WEIGHT: 127.8 LBS | SYSTOLIC BLOOD PRESSURE: 140 MMHG | TEMPERATURE: 98.2 F | BODY MASS INDEX: 23.52 KG/M2

## 2019-11-07 DIAGNOSIS — D64.9 ANEMIA, UNSPECIFIED TYPE: ICD-10-CM

## 2019-11-07 DIAGNOSIS — R19.5 OCCULT BLOOD POSITIVE STOOL: Primary | ICD-10-CM

## 2019-11-07 PROCEDURE — 99203 OFFICE O/P NEW LOW 30 MIN: CPT | Performed by: PHYSICIAN ASSISTANT

## 2019-11-07 PROCEDURE — 46600 DIAGNOSTIC ANOSCOPY SPX: CPT | Performed by: PHYSICIAN ASSISTANT

## 2019-11-07 RX ORDER — SODIUM CHLORIDE, SODIUM LACTATE, POTASSIUM CHLORIDE, CALCIUM CHLORIDE 600; 310; 30; 20 MG/100ML; MG/100ML; MG/100ML; MG/100ML
30 INJECTION, SOLUTION INTRAVENOUS CONTINUOUS
Status: CANCELLED | OUTPATIENT
Start: 2019-11-07

## 2019-11-14 ENCOUNTER — OFFICE VISIT (OUTPATIENT)
Dept: CARDIOLOGY | Facility: CLINIC | Age: 74
End: 2019-11-14

## 2019-11-14 VITALS
DIASTOLIC BLOOD PRESSURE: 68 MMHG | SYSTOLIC BLOOD PRESSURE: 100 MMHG | HEART RATE: 77 BPM | HEIGHT: 62 IN | BODY MASS INDEX: 23.46 KG/M2 | WEIGHT: 127.5 LBS

## 2019-11-14 DIAGNOSIS — E78.2 MIXED HYPERLIPIDEMIA: ICD-10-CM

## 2019-11-14 DIAGNOSIS — I25.10 CORONARY ARTERY DISEASE INVOLVING NATIVE CORONARY ARTERY OF NATIVE HEART WITHOUT ANGINA PECTORIS: Primary | ICD-10-CM

## 2019-11-14 DIAGNOSIS — R93.1 HIGH CORONARY ARTERY CALCIUM SCORE: ICD-10-CM

## 2019-11-14 DIAGNOSIS — I10 ESSENTIAL HYPERTENSION: ICD-10-CM

## 2019-11-14 PROCEDURE — 93000 ELECTROCARDIOGRAM COMPLETE: CPT | Performed by: INTERNAL MEDICINE

## 2019-11-14 PROCEDURE — 99214 OFFICE O/P EST MOD 30 MIN: CPT | Performed by: INTERNAL MEDICINE

## 2019-11-14 NOTE — PROGRESS NOTES
PATIENTINFORMATION    Date of Office Visit: 19  Encounter Provider: Jasmin Mehta MD  Place of Service: [facility] CARDIOLOG[facility]  Patient Name: Dee Jorge  : 1945    Subjective:         Patient ID: Dee Jorge is a 74 y.o. female.      History of Present Illness    This is a woman whom I saw in the remote past and then came back to see me 18. She has a history of hypertension and hyperlipidemia but is statin intolerant. She says she has tried about 5 different statins and cannot tolerate them. She had an echocardiogram in 2018 which showed mild asymmetric septal hypertrophy, normal LV systolic function with an ejection fraction of 59%, grade I diastolic dysfunction, calcified aortic valve with mild-to-moderate aortic regurgitation. She had a calcium score of 404 on 18.      She is feeling well.  She denies palpitations, shortness of breath, edema or chest pain.  She sometimes has some lightheadedness with position change.  She walks about 2 miles 2 times a day when the weather permits to walk her dog.    Review of Systems   Constitution: Negative for fever, malaise/fatigue, weight gain and weight loss.   HENT: Negative for ear pain, hearing loss, nosebleeds and sore throat.    Eyes: Negative for double vision, pain, vision loss in left eye and vision loss in right eye.   Cardiovascular:        See history of present illness.   Respiratory: Negative for cough, shortness of breath, sleep disturbances due to breathing, snoring and wheezing.    Endocrine: Positive for cold intolerance. Negative for heat intolerance and polyuria.   Skin: Negative for itching, poor wound healing and rash.   Musculoskeletal: Negative for joint pain, joint swelling and myalgias.   Gastrointestinal: Negative for abdominal pain, diarrhea, hematochezia, nausea and vomiting.   Genitourinary: Negative for hematuria and hesitancy.   Neurological: Negative for numbness, paresthesias and  "seizures.   Psychiatric/Behavioral: Negative for depression. The patient is not nervous/anxious.            ECG 12 Lead  Date/Time: 11/14/2019 2:28 PM  Performed by: Jasmin Mehta MD  Authorized by: Jasmin Mehta MD   Comparison: compared with previous ECG from 11/15/2019  Similar to previous ECG  Rhythm: sinus rhythm  BPM: 77  Conduction: conduction normal  ST Segments: ST segments normal  T Waves: T waves normal    Clinical impression: normal ECG               Objective:     /68 (BP Location: Left arm, Patient Position: Sitting, Cuff Size: Adult)   Pulse 77   Ht 157.5 cm (62\")   Wt 57.8 kg (127 lb 8 oz)   LMP  (LMP Unknown)   BMI 23.32 kg/m²  Body mass index is 23.32 kg/m².     Physical Exam   Constitutional: She appears well-developed.   HENT:   Head: Normocephalic and atraumatic.   Eyes: Conjunctivae and lids are normal. Pupils are equal, round, and reactive to light. Lids are everted and swept, no foreign bodies found.   Neck: Normal range of motion. No JVD present. Carotid bruit is not present. No tracheal deviation present. No thyroid mass present.   Cardiovascular: Normal rate, regular rhythm and normal heart sounds.   Pulses:       Dorsalis pedis pulses are 2+ on the right side, and 2+ on the left side.   Pulmonary/Chest: Effort normal and breath sounds normal.   Abdominal: Normal appearance and bowel sounds are normal.   Musculoskeletal: Normal range of motion.   Neurological: She is alert. She has normal strength.   Skin: Skin is warm, dry and intact.   Psychiatric: She has a normal mood and affect. Her behavior is normal.   Vitals reviewed.      Lab Review:   Component      Latest Ref Rng & Units 8/13/2018 11/26/2018 8/22/2019   Total Cholesterol      0 - 200 mg/dL 245 (H) 230 (H) 207 (H)   Triglycerides      0 - 150 mg/dL 106 127 124   HDL Cholesterol      40 - 60 mg/dL 70 (H) 59 52   VLDL Cholesterol      mg/dL 21.2 25 24.8   LDL Cholesterol      0 - 100 mg/dL 154 (H) 146 (H) 130 (H) "         Assessment/Plan:       1. Coronary Artery Disease  Assessment  • The patient has no angina     Plan  • Lifestyle modifications discussed include adhering to a heart healthy diet, medication compliance, regular exercise and regular monitoring of cholesterol and blood pressure     Subjective - Objective  • Current antiplatelet therapy includes aspirin 81 mg.  She could not tolerate statins.  She stopped Zetia because of diarrhea.  I think she would be a candidate for a PCSK9 inhibitor.  Right now she is dealing with some anemia and is can need a work-up for that.  I told her we could probably wait until that covered.    2.  Hypertension.  Generally well controlled.    3.  Hyperlipidemia.  Has not tolerated statins or Zetia.  4.  Mild-to-moderate aortic regurgitation.  I will plan on monitoring this going forward.     They will see her back in one year.    Orders Placed This Encounter   Procedures   • ECG 12 Lead     This order was created via procedure documentation        Discharge Medications           Accurate as of 11/14/19  3:07 PM. If you have any questions, ask your nurse or doctor.               Continue These Medications      Instructions Start Date   carvedilol 12.5 MG tablet  Commonly known as:  COREG   12.5 mg, Oral, 2 Times Daily With Meals      ibuprofen 200 MG tablet  Commonly known as:  ADVIL,MOTRIN   200 mg, Oral, Every 8 Hours PRN      levothyroxine 88 MCG tablet  Commonly known as:  SYNTHROID, LEVOTHROID   88 mcg, Oral, Daily      spironolactone 50 MG tablet  Commonly known as:  ALDACTONE   50 mg, Oral, Daily         Stop These Medications    diclofenac 1.3 % patch patch  Commonly known as:  FLECTOR  Stopped by:  Jasmin Mehta MD     ezetimibe 10 MG tablet  Commonly known as:  ZETIA  Stopped by:  Jasmin Mehta MD     folic acid 400 MCG tablet  Commonly known as:  FOLVITE  Stopped by:  MD Jasmin Osuna MD  11/14/19  3:07 PM

## 2019-12-09 ENCOUNTER — OFFICE VISIT (OUTPATIENT)
Dept: INTERNAL MEDICINE | Facility: CLINIC | Age: 74
End: 2019-12-09

## 2019-12-09 VITALS
BODY MASS INDEX: 23.55 KG/M2 | OXYGEN SATURATION: 98 % | TEMPERATURE: 98.3 F | RESPIRATION RATE: 16 BRPM | HEIGHT: 62 IN | WEIGHT: 128 LBS | HEART RATE: 74 BPM | DIASTOLIC BLOOD PRESSURE: 64 MMHG | SYSTOLIC BLOOD PRESSURE: 106 MMHG

## 2019-12-09 DIAGNOSIS — J06.9 ACUTE URI: Primary | ICD-10-CM

## 2019-12-09 PROCEDURE — 99213 OFFICE O/P EST LOW 20 MIN: CPT | Performed by: NURSE PRACTITIONER

## 2019-12-09 RX ORDER — METHYLPREDNISOLONE 4 MG/1
TABLET ORAL
Qty: 21 TABLET | Refills: 0 | Status: SHIPPED | OUTPATIENT
Start: 2019-12-09 | End: 2020-01-16

## 2019-12-09 RX ORDER — GUAIFENESIN AND CODEINE PHOSPHATE 100; 10 MG/5ML; MG/5ML
5 SOLUTION ORAL 4 TIMES DAILY PRN
Qty: 180 ML | Refills: 0 | Status: SHIPPED | OUTPATIENT
Start: 2019-12-09 | End: 2020-03-02

## 2019-12-09 NOTE — PROGRESS NOTES
Subjective   Dee Jorge is a 74 y.o. female.   Chief Complaint   Patient presents with   • Cough     x 3 days    • Wheezing     Vitals:    12/09/19 1531   BP: 106/64   Pulse: 74   Resp: 16   Temp: 98.3 °F (36.8 °C)   SpO2: 98%     No LMP recorded (lmp unknown). Patient has had a hysterectomy.    Cough   This is a new problem. Episode onset: 3 days  The problem has been unchanged. The cough is non-productive. Associated symptoms include ear pain, nasal congestion, postnasal drip, rhinorrhea, a sore throat and wheezing. Pertinent negatives include no chills, fever or headaches. The symptoms are aggravated by lying down. She has tried a beta-agonist inhaler (mucinex, tylenol ) for the symptoms. The treatment provided mild relief. Her past medical history is significant for asthma and bronchitis.   Wheezing    Associated symptoms include coughing, ear pain, rhinorrhea and a sore throat. Pertinent negatives include no chills, fever or headaches. Her past medical history is significant for asthma.        The following portions of the patient's history were reviewed and updated as appropriate: allergies, current medications, past family history, past medical history, past social history, past surgical history and problem list.    Review of Systems   Constitutional: Positive for fatigue. Negative for chills and fever.   HENT: Positive for congestion, ear pain, postnasal drip, rhinorrhea and sore throat.    Respiratory: Positive for cough and wheezing.    Neurological: Negative for headaches.       Objective   Physical Exam   Constitutional: Vital signs are normal. She appears well-developed and well-nourished. No distress.   HENT:   Right Ear: Tympanic membrane normal.   Left Ear: Tympanic membrane normal.   Nose: Mucosal edema and rhinorrhea present.   Mouth/Throat: Uvula is midline and mucous membranes are normal. Posterior oropharyngeal erythema present.   Cardiovascular: Normal rate, regular rhythm and normal heart  sounds.   Pulmonary/Chest: Effort normal and breath sounds normal.   Lymphadenopathy:        Head (right side): Tonsillar adenopathy present.        Head (left side): Tonsillar adenopathy present.   Neurological: She is alert.   Skin: Skin is warm and dry.       Assessment/Plan   Dee was seen today for cough and wheezing.    Diagnoses and all orders for this visit:    Acute URI    Other orders  -     methylPREDNISolone (MEDROL, NEO,) 4 MG tablet; Take as directed on package instructions.  -     guaiFENesin-codeine (GUAIFENESIN AC) 100-10 MG/5ML liquid; Take 5 mL by mouth 4 (Four) Times a Day As Needed for Cough or Congestion.    rest and drink plenty of fluids  Albuterol as needed  Follow up in one week if symptoms persist, or sooner if symptoms worsen or if new symptoms develop

## 2020-01-16 ENCOUNTER — OFFICE VISIT (OUTPATIENT)
Dept: INTERNAL MEDICINE | Facility: CLINIC | Age: 75
End: 2020-01-16

## 2020-01-16 VITALS
BODY MASS INDEX: 23.37 KG/M2 | DIASTOLIC BLOOD PRESSURE: 78 MMHG | OXYGEN SATURATION: 98 % | HEIGHT: 62 IN | SYSTOLIC BLOOD PRESSURE: 122 MMHG | HEART RATE: 86 BPM | WEIGHT: 127 LBS

## 2020-01-16 DIAGNOSIS — J40 BRONCHITIS: Primary | ICD-10-CM

## 2020-01-16 LAB
HCT VFR BLDA CALC: 34.1 % (ref 38–51)
HGB BLDA-MCNC: 10.9 G/DL (ref 12–17)
MCH, POC: 29.2
MCHC, POC: 32.1
MCV, POC: 90.8
PLATELET # BLD AUTO: 449 10*3/MM3
PMV BLD: 6.4 FL
RBC, POC: 3.75
RDW, POC: 13.4
WBC # BLD: 10 10*3/UL

## 2020-01-16 PROCEDURE — 71046 X-RAY EXAM CHEST 2 VIEWS: CPT | Performed by: INTERNAL MEDICINE

## 2020-01-16 PROCEDURE — 99214 OFFICE O/P EST MOD 30 MIN: CPT | Performed by: INTERNAL MEDICINE

## 2020-01-16 PROCEDURE — 85027 COMPLETE CBC AUTOMATED: CPT | Performed by: INTERNAL MEDICINE

## 2020-01-16 RX ORDER — DOXYCYCLINE 100 MG/1
100 CAPSULE ORAL 2 TIMES DAILY
Qty: 14 CAPSULE | Refills: 0 | Status: SHIPPED | OUTPATIENT
Start: 2020-01-16 | End: 2020-03-02

## 2020-01-16 RX ORDER — LEVALBUTEROL INHALATION SOLUTION 0.63 MG/3ML
1 SOLUTION RESPIRATORY (INHALATION) EVERY 6 HOURS PRN
Qty: 30 AMPULE | Refills: 12 | Status: SHIPPED | OUTPATIENT
Start: 2020-01-16 | End: 2021-01-07

## 2020-01-16 RX ORDER — LEVALBUTEROL TARTRATE 45 UG/1
1-2 AEROSOL, METERED ORAL EVERY 4 HOURS PRN
Qty: 1 INHALER | Refills: 11 | Status: SHIPPED | OUTPATIENT
Start: 2020-01-16 | End: 2021-04-19 | Stop reason: SDUPTHER

## 2020-01-16 RX ORDER — BENZONATATE 100 MG/1
100 CAPSULE ORAL 3 TIMES DAILY PRN
Qty: 30 CAPSULE | Refills: 3 | Status: SHIPPED | OUTPATIENT
Start: 2020-01-16 | End: 2020-03-02

## 2020-01-16 NOTE — PROGRESS NOTES
Chief Complaint   Patient presents with   • Cough       History of Present Illness   Dee Jorge is a 74 y.o. female presents for acute care. She has been experiencing a cough for the last one month. She came into the office and was diagnosed with a viral infection. Given medrol dose pack and cheratussin. She felt symptomatic improvement but the cough persisted. She now has a continued cough and feels unwell. She has anemia and is scheduled for a colonoscopy.     The following portions of the patient's history were reviewed and updated as appropriate: allergies, current medications, past family history, past medical history, past social history, past surgical history and problem list.  Current Outpatient Medications on File Prior to Visit   Medication Sig Dispense Refill   • carvedilol (COREG) 12.5 MG tablet Take 1 tablet by mouth 2 (Two) Times a Day With Meals. 180 tablet 2   • guaiFENesin-codeine (GUAIFENESIN AC) 100-10 MG/5ML liquid Take 5 mL by mouth 4 (Four) Times a Day As Needed for Cough or Congestion. 180 mL 0   • ibuprofen (ADVIL,MOTRIN) 200 MG tablet Take 200 mg by mouth Every 8 (Eight) Hours As Needed.     • levothyroxine (SYNTHROID, LEVOTHROID) 88 MCG tablet Take 1 tablet by mouth Daily. 90 tablet 2   • spironolactone (ALDACTONE) 50 MG tablet Take 1 tablet by mouth Daily. 90 tablet 3   • [DISCONTINUED] methylPREDNISolone (MEDROL, NEO,) 4 MG tablet Take as directed on package instructions. 21 tablet 0     No current facility-administered medications on file prior to visit.      Review of Systems   Constitutional: Negative.    HENT: Negative.    Eyes: Negative.    Respiratory: Positive for cough, shortness of breath and wheezing.    Cardiovascular: Negative.    Gastrointestinal: Negative.    Endocrine: Negative.    Genitourinary: Negative.    Musculoskeletal: Negative.    Skin: Negative.    Allergic/Immunologic: Negative.    Neurological: Negative.    Hematological: Negative.    Psychiatric/Behavioral:  "Negative.        Objective   Physical Exam   Constitutional: She is oriented to person, place, and time. She appears well-developed and well-nourished.   HENT:   Head: Normocephalic and atraumatic.   Right Ear: External ear normal.   Left Ear: External ear normal.   Pharyngeal erythema   Eyes: Pupils are equal, round, and reactive to light. EOM are normal.   Neck: Normal range of motion. Neck supple.   Cardiovascular: Normal rate, regular rhythm and normal heart sounds.   Pulmonary/Chest: Effort normal and breath sounds normal.   Abdominal: Soft. Bowel sounds are normal.   Musculoskeletal: Normal range of motion.   Neurological: She is alert and oriented to person, place, and time.   Skin: Skin is warm and dry.   Psychiatric: She has a normal mood and affect. Her behavior is normal. Judgment and thought content normal.   Nursing note and vitals reviewed.     CXR for cough. No acute findings. No prior for comparison.   Wbc wnl. hgb 10.9    /78   Pulse 86   Ht 157.5 cm (62\")   Wt 57.6 kg (127 lb)   LMP  (LMP Unknown)   SpO2 98%   BMI 23.23 kg/m²     Assessment/Plan   Diagnoses and all orders for this visit:    Bronchitis  -     POC CBC  -     Basic Metabolic Panel  -     XR Chest PA & Lateral (In Office)    Other orders  -     doxycycline (MONODOX) 100 MG capsule; Take 1 capsule by mouth 2 (Two) Times a Day.  -     benzonatate (TESSALON PERLES) 100 MG capsule; Take 1 capsule by mouth 3 (Three) Times a Day As Needed for Cough.  -     levalbuterol (XOPENEX HFA) 45 MCG/ACT inhaler; Inhale 1-2 puffs Every 4 (Four) Hours As Needed for Wheezing.  -     levalbuterol (XOPENEX) 0.63 MG/3ML nebulizer solution; Take 1 ampule by nebulization Every 6 (Six) Hours As Needed for Wheezing or Shortness of Air.        paient w/ subacute bronchitis w/ bronchospasm. Will treat with doxycycline. cxr unrevealing. She will take tessalon q 8 hours as needed. xopenex as needed for bronchospasm. She also has virtussin and may take " this.

## 2020-01-21 DIAGNOSIS — J18.9 PNEUMONIA DUE TO INFECTIOUS ORGANISM, UNSPECIFIED LATERALITY, UNSPECIFIED PART OF LUNG: ICD-10-CM

## 2020-01-21 DIAGNOSIS — D64.9 ANEMIA, UNSPECIFIED TYPE: Primary | ICD-10-CM

## 2020-01-21 PROCEDURE — 71046 X-RAY EXAM CHEST 2 VIEWS: CPT | Performed by: INTERNAL MEDICINE

## 2020-01-22 ENCOUNTER — ANESTHESIA EVENT (OUTPATIENT)
Dept: GASTROENTEROLOGY | Facility: HOSPITAL | Age: 75
End: 2020-01-22

## 2020-01-22 ENCOUNTER — ANESTHESIA (OUTPATIENT)
Dept: GASTROENTEROLOGY | Facility: HOSPITAL | Age: 75
End: 2020-01-22

## 2020-01-22 ENCOUNTER — HOSPITAL ENCOUNTER (OUTPATIENT)
Facility: HOSPITAL | Age: 75
Setting detail: HOSPITAL OUTPATIENT SURGERY
Discharge: HOME | End: 2020-01-22
Attending: COLON & RECTAL SURGERY | Admitting: COLON & RECTAL SURGERY

## 2020-01-22 VITALS
WEIGHT: 123.5 LBS | SYSTOLIC BLOOD PRESSURE: 102 MMHG | TEMPERATURE: 98 F | HEIGHT: 62 IN | RESPIRATION RATE: 16 BRPM | OXYGEN SATURATION: 97 % | DIASTOLIC BLOOD PRESSURE: 62 MMHG | HEART RATE: 64 BPM | BODY MASS INDEX: 22.73 KG/M2

## 2020-01-22 DIAGNOSIS — R19.5 OCCULT BLOOD POSITIVE STOOL: ICD-10-CM

## 2020-01-22 PROCEDURE — 25010000002 PROPOFOL 10 MG/ML EMULSION: Performed by: NURSE ANESTHETIST, CERTIFIED REGISTERED

## 2020-01-22 PROCEDURE — 45378 DIAGNOSTIC COLONOSCOPY: CPT | Performed by: COLON & RECTAL SURGERY

## 2020-01-22 RX ORDER — LIDOCAINE HYDROCHLORIDE 20 MG/ML
INJECTION, SOLUTION INFILTRATION; PERINEURAL AS NEEDED
Status: DISCONTINUED | OUTPATIENT
Start: 2020-01-22 | End: 2020-01-22 | Stop reason: SURG

## 2020-01-22 RX ORDER — SODIUM CHLORIDE, SODIUM LACTATE, POTASSIUM CHLORIDE, CALCIUM CHLORIDE 600; 310; 30; 20 MG/100ML; MG/100ML; MG/100ML; MG/100ML
30 INJECTION, SOLUTION INTRAVENOUS CONTINUOUS
Status: DISCONTINUED | OUTPATIENT
Start: 2020-01-22 | End: 2020-01-22 | Stop reason: HOSPADM

## 2020-01-22 RX ORDER — PROPOFOL 10 MG/ML
VIAL (ML) INTRAVENOUS AS NEEDED
Status: DISCONTINUED | OUTPATIENT
Start: 2020-01-22 | End: 2020-01-22 | Stop reason: SURG

## 2020-01-22 RX ORDER — ONDANSETRON 2 MG/ML
4 INJECTION INTRAMUSCULAR; INTRAVENOUS ONCE AS NEEDED
Status: DISCONTINUED | OUTPATIENT
Start: 2020-01-22 | End: 2020-01-22 | Stop reason: HOSPADM

## 2020-01-22 RX ORDER — PROPOFOL 10 MG/ML
VIAL (ML) INTRAVENOUS CONTINUOUS PRN
Status: DISCONTINUED | OUTPATIENT
Start: 2020-01-22 | End: 2020-01-22 | Stop reason: SURG

## 2020-01-22 RX ADMIN — PROPOFOL 50 MG: 10 INJECTION, EMULSION INTRAVENOUS at 07:41

## 2020-01-22 RX ADMIN — SODIUM CHLORIDE, POTASSIUM CHLORIDE, SODIUM LACTATE AND CALCIUM CHLORIDE 30 ML/HR: 600; 310; 30; 20 INJECTION, SOLUTION INTRAVENOUS at 07:30

## 2020-01-22 RX ADMIN — LIDOCAINE HYDROCHLORIDE 40 MG: 20 INJECTION, SOLUTION INFILTRATION; PERINEURAL at 07:38

## 2020-01-22 RX ADMIN — PROPOFOL 200 MCG/KG/MIN: 10 INJECTION, EMULSION INTRAVENOUS at 07:41

## 2020-01-22 NOTE — ANESTHESIA PREPROCEDURE EVALUATION
" Anesthesia Evaluation     Patient summary reviewed and Nursing notes reviewed   NPO Solid Status: > 8 hours  NPO Liquid Status: > 2 hours           Airway   Mallampati: II  TM distance: >3 FB  Neck ROM: full  No difficulty expected  Dental - normal exam     Pulmonary - normal exam    breath sounds clear to auscultation  (+) asthma,  Cardiovascular - normal exam    Rhythm: regular  Rate: normal    (+) hypertension 2 medications or greater, valvular problems/murmurs (\"leaky aortic valve\"), CAD, hyperlipidemia,       Neuro/Psych  (+) psychiatric history Anxiety,     GI/Hepatic/Renal/Endo    (+)   thyroid problem hypothyroidism    Musculoskeletal     Abdominal  - normal exam   Substance History - negative use     OB/GYN          Other   arthritis,                      Anesthesia Plan    ASA 3     intravenous induction     Anesthetic plan, all risks, benefits, and alternatives have been provided, discussed and informed consent has been obtained with: patient.      "

## 2020-01-22 NOTE — ANESTHESIA POSTPROCEDURE EVALUATION
Patient: Dee Jorge    Procedure Summary     Date:  01/22/20 Room / Location:   [facility]    Anesthesia Start:  0738 Anesthesia Stop:  0804    Procedure:  COLONOSCOPY to cecum (N/A ) Diagnosis:       Occult blood positive stool      (Occult blood positive stool [R19.5])    Surgeon:  Sammy Zuluaga MD Provider:  Georgette Whiet MD    Anesthesia Type:  MAC ASA Status:  3          Anesthesia Type: MAC    Vitals  Vitals Value Taken Time   /62 1/22/2020  8:24 AM   Temp     Pulse 64 1/22/2020  8:24 AM   Resp 16 1/22/2020  8:24 AM   SpO2 97 % 1/22/2020  8:24 AM           Post Anesthesia Care and Evaluation    Patient location during evaluation: PACU  Patient participation: complete - patient participated  Level of consciousness: awake and alert  Pain management: adequate  Airway patency: patent  Anesthetic complications: No anesthetic complications  PONV Status: none  Cardiovascular status: acceptable  Respiratory status: acceptable  Hydration status: acceptable

## 2020-01-22 NOTE — H&P
Dee Jorge is a 74 y.o. female  who is referred by Sammy Zuluaga MD for a colonoscopy. She is an  has a history of iron deficiency anemia.     She denies any change in bowel function, melena, or hematochezia.    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   • Bronchitis    • CAD (coronary artery disease)    • Cataract 10/05/2016    BILATERAL   • Constipation    • Cystocele    • Fatigue    • Foot trauma, left, initial encounter 02/13/2017   • Fracture of patella    • Hearing loss    • Hyperlipidemia    • Hypertension    • Hypothyroidism     ACQUIRED   • Insomnia    • Mastodynia 09/29/2015   • OA (osteoarthritis)    • Osteoporosis    • Patella fracture 2010    LEFT   • Positive occult stool blood test 10/04/2019   • Postmenopausal    • Rectocele    • Right shoulder strain 08/31/2002    D/T FALL, SEEN AT [facility] ER   • Rotator cuff tear, right 12/05/2014    SAW DR. CHARLES BARTLETT   • Seborrheic keratosis    • Trigger finger, left middle finger 02/2016       Past Surgical History:   Procedure Laterality Date   • ANTERIOR AND POSTERIOR VAGINAL REPAIR N/A 09/21/2011    RECTOCELE AND CYSTOCELE REPAIR, PLACEMENT OF PROLIFT GRAFT, DR. LISA PALM AT [facility]   • BUNIONECTOMY Left 08/02/2010    LEFT MEDIAL EXTRA-ARTICULAR GANGLION CYST REMOVAL AND FIRST MTP CHILECTOMY WITH SYNOVECTOMY, DR. BHAVANI BABCOCK AT [facility]   • CHOLECYSTECTOMY N/A 1992    DR. QUINCY CEE   • COLONOSCOPY N/A 09/18/2015    ENTIRE COLON WNL, RESCOPE IN 10 YRS, DR. QUINCY VILLARREAL AT [place]   • COLONOSCOPY N/A 07/20/2005    WNL, DR. MAY VAZQUEZ AT [facility]   • HERNIA REPAIR Bilateral 1972    DR. AIDEN ARZATE   • HYSTERECTOMY N/A 1984    DR. ZEKE BLANDON   • KNEE ARTHROSCOPY Right    • KNEE CARTILAGE SURGERY Right 08/21/2012    RIGHT  CHONDROPLASTY OF PATELLA AND TROCHLEA, CHONDROPLASTY MEDIAL FEMORAL CONDYLE, CHONDROPLASTY LATERAL TIBIAL PLATEAU AND LATERAL FEMORAL CONDYLE, SUBTOTAL LATERAL MENISCECTOMY, OSTEOPLASTY ANTERIOR TIBIAL INTERCONDYLAR NOTCH, DR. BHAVANI BABCOCK AT [facility]   • NASAL ENDOSCOPY N/A 03/11/2015    ANTERIOR RHINOSCOPY, CONGESTION OF NASAL MUCOSA, DR. BRENDAN CHAMPION   • SALPINGO OOPHORECTOMY Right 07/30/2008    FOR ADNEXAL MASS, DR. LISA PALM AT [facility]   • TOTAL HIP ARTHROPLASTY Left 05/06/2013    DR.REID PALM AT [facility]   • TOTAL HIP ARTHROPLASTY Right 10/13/2008    DR. KELVIN TARIQ AT [place]   • TOTAL KNEE ARTHROPLASTY Left 01/20/2014    DR. GORDO PALM AT [facility]       Medications Prior to Admission   Medication Sig Dispense Refill Last Dose   • benzonatate (TESSALON PERLES) 100 MG capsule Take 1 capsule by mouth 3 (Three) Times a Day As Needed for Cough. 30 capsule 3 Past Week at Unknown time   • carvedilol (COREG) 12.5 MG tablet Take 1 tablet by mouth 2 (Two) Times a Day With Meals. 180 tablet 2 1/22/2020 at Unknown time   • doxycycline (MONODOX) 100 MG capsule Take 1 capsule by mouth 2 (Two) Times a Day. 14 capsule 0 1/21/2020   • ibuprofen (ADVIL,MOTRIN) 200 MG tablet Take 200 mg by mouth Every 8 (Eight) Hours As Needed.   Past Week at Unknown time   • levalbuterol (XOPENEX HFA) 45 MCG/ACT inhaler Inhale 1-2 puffs Every 4 (Four) Hours As Needed for Wheezing. 1 inhaler 11 1/21/2020   • levothyroxine (SYNTHROID, LEVOTHROID) 88 MCG tablet Take 1 tablet by mouth Daily. 90 tablet 2 1/22/2020 at Unknown time   • spironolactone (ALDACTONE) 50 MG tablet Take 1 tablet by mouth Daily. 90 tablet 3 1/21/2020   • guaiFENesin-codeine (GUAIFENESIN AC) 100-10 MG/5ML liquid Take 5 mL by mouth 4 (Four) Times a Day As Needed for Cough or Congestion. 180 mL 0  at Unknown time   • levalbuterol (XOPENEX) 0.63 MG/3ML nebulizer solution Take 1 ampule by nebulization Every 6 (Six) Hours As Needed for Wheezing or Shortness of Air. 30 ampule 12  at  Unknown time       Allergies   Allergen Reactions   • Statins Myalgia   • Codeine Nausea And Vomiting   • Penicillins Hives   • Sulfa Antibiotics Nausea And Vomiting and Other (See Comments)     HEADACHES       Family History   Problem Relation Age of Onset   • Hypertension Mother    • Thyroid disease Mother    • Stroke Mother    • Asthma Mother    • Asthma Father    • Emphysema Father    • Alcohol abuse Father    • Hypertension Father    • COPD Father    • Thyroid disease Daughter    • Diabetes type I Daughter    • Diabetes Daughter    • Stroke Maternal Grandfather         ischemic   • Kidney disease Sister    • Hypertension Sister    • Twan's disease Grandchild    • Asthma Grandchild    • Breast cancer Neg Hx        Social History     Socioeconomic History   • Marital status:      Spouse name: Not on file   • Number of children: Not on file   • Years of education: Not on file   • Highest education level: Not on file   Tobacco Use   • Smoking status: Never Smoker   • Smokeless tobacco: Never Used   Substance and Sexual Activity   • Alcohol use: No     Comment: daily caffiene   • Drug use: No   • Sexual activity: Defer     Birth control/protection: Post-menopausal, Surgical       Review of Systems   Gastrointestinal: Negative for abdominal pain, nausea and vomiting.   All other systems reviewed and are negative.      Vitals:    01/22/20 0728   BP: 142/76   Pulse: 71   Resp: 16   Temp: 98 °F (36.7 °C)   SpO2: 100%         Physical Exam   Constitutional: She is oriented to person, place, and time. She appears well-developed and well-nourished.   HENT:   Head: Normocephalic and atraumatic.   Eyes: Pupils are equal, round, and reactive to light. EOM are normal.   Cardiovascular: Regular rhythm.   Pulmonary/Chest: Effort normal.   Abdominal: Soft. She exhibits no distension.   Musculoskeletal: Normal range of motion.   Neurological: She is alert and oriented to person, place, and time.   Skin: Skin is warm and  dry.   Psychiatric: Judgment and thought content normal.         Assessment/Plan      iron deficiency anemia.         I recommend colonoscopy.  I described risks, benefits of the procedure with the patient including but not limited to bleeding, infection, possibility of perforation and possible polypectomy. All of the patient's questions were answered and they would like to proceed with the above recommendations.

## 2020-01-29 ENCOUNTER — TELEPHONE (OUTPATIENT)
Dept: INTERNAL MEDICINE | Facility: CLINIC | Age: 75
End: 2020-01-29

## 2020-01-29 NOTE — TELEPHONE ENCOUNTER
Pt LM that she needed to schedule blood work with a chest xray.  If so orders needed. She is coming in next week., unless you want her this week to have test

## 2020-01-30 DIAGNOSIS — D64.9 ANEMIA, UNSPECIFIED TYPE: ICD-10-CM

## 2020-01-30 DIAGNOSIS — I10 ESSENTIAL HYPERTENSION: Primary | ICD-10-CM

## 2020-01-30 DIAGNOSIS — E03.9 ACQUIRED HYPOTHYROIDISM: ICD-10-CM

## 2020-02-10 ENCOUNTER — TELEPHONE (OUTPATIENT)
Dept: INTERNAL MEDICINE | Facility: CLINIC | Age: 75
End: 2020-02-10

## 2020-02-10 LAB
BASOPHILS # BLD AUTO: 0.06 10*3/MM3 (ref 0–0.2)
BASOPHILS NFR BLD AUTO: 0.8 % (ref 0–1.5)
BUN SERPL-MCNC: 22 MG/DL (ref 8–23)
BUN/CREAT SERPL: 31 (ref 7–25)
CALCIUM SERPL-MCNC: 9.9 MG/DL (ref 8.6–10.5)
CHLORIDE SERPL-SCNC: 104 MMOL/L (ref 98–107)
CO2 SERPL-SCNC: 23.5 MMOL/L (ref 22–29)
CREAT SERPL-MCNC: 0.71 MG/DL (ref 0.57–1)
EOSINOPHIL # BLD AUTO: 0.34 10*3/MM3 (ref 0–0.4)
EOSINOPHIL NFR BLD AUTO: 4.7 % (ref 0.3–6.2)
ERYTHROCYTE [DISTWIDTH] IN BLOOD BY AUTOMATED COUNT: 13.1 % (ref 12.3–15.4)
FOLATE SERPL-MCNC: 14.4 NG/ML (ref 4.78–24.2)
GLUCOSE SERPL-MCNC: 105 MG/DL (ref 65–99)
HCT VFR BLD AUTO: 34.3 % (ref 34–46.6)
HGB BLD-MCNC: 11.2 G/DL (ref 12–15.9)
IMM GRANULOCYTES # BLD AUTO: 0.02 10*3/MM3 (ref 0–0.05)
IMM GRANULOCYTES NFR BLD AUTO: 0.3 % (ref 0–0.5)
IRON SATN MFR SERPL: 16 % (ref 20–50)
IRON SERPL-MCNC: 75 MCG/DL (ref 37–145)
LYMPHOCYTES # BLD AUTO: 1.42 10*3/MM3 (ref 0.7–3.1)
LYMPHOCYTES NFR BLD AUTO: 19.6 % (ref 19.6–45.3)
MCH RBC QN AUTO: 29.6 PG (ref 26.6–33)
MCHC RBC AUTO-ENTMCNC: 32.7 G/DL (ref 31.5–35.7)
MCV RBC AUTO: 90.5 FL (ref 79–97)
MONOCYTES # BLD AUTO: 0.57 10*3/MM3 (ref 0.1–0.9)
MONOCYTES NFR BLD AUTO: 7.9 % (ref 5–12)
NEUTROPHILS # BLD AUTO: 4.83 10*3/MM3 (ref 1.7–7)
NEUTROPHILS NFR BLD AUTO: 66.7 % (ref 42.7–76)
NRBC BLD AUTO-RTO: 0 /100 WBC (ref 0–0.2)
PLATELET # BLD AUTO: 383 10*3/MM3 (ref 140–450)
POTASSIUM SERPL-SCNC: 4 MMOL/L (ref 3.5–5.2)
RBC # BLD AUTO: 3.79 10*6/MM3 (ref 3.77–5.28)
SODIUM SERPL-SCNC: 139 MMOL/L (ref 136–145)
TIBC SERPL-MCNC: 483 MCG/DL
TSH SERPL DL<=0.005 MIU/L-ACNC: 1.92 UIU/ML (ref 0.27–4.2)
UIBC SERPL-MCNC: 408 MCG/DL (ref 112–346)
VIT B12 SERPL-MCNC: 359 PG/ML (ref 211–946)
WBC # BLD AUTO: 7.24 10*3/MM3 (ref 3.4–10.8)

## 2020-02-10 NOTE — TELEPHONE ENCOUNTER
Patient had her repeat labs and chest x-ray done today. She was wondering does she need to make an appointment with Dr. Loving next Monday to go over the results or will she be called with the results?

## 2020-02-10 NOTE — TELEPHONE ENCOUNTER
If patient is feeling well then she will be contacted w/ results and can keep her scheduled f/u in march.

## 2020-02-11 RX ORDER — LANOLIN ALCOHOL/MO/W.PET/CERES
1000 CREAM (GRAM) TOPICAL DAILY
Qty: 90 TABLET | Refills: 4 | Status: SHIPPED | OUTPATIENT
Start: 2020-02-11 | End: 2021-09-27 | Stop reason: SDUPTHER

## 2020-03-02 ENCOUNTER — OFFICE VISIT (OUTPATIENT)
Dept: INTERNAL MEDICINE | Facility: CLINIC | Age: 75
End: 2020-03-02

## 2020-03-02 VITALS
WEIGHT: 124 LBS | BODY MASS INDEX: 22.82 KG/M2 | DIASTOLIC BLOOD PRESSURE: 74 MMHG | HEIGHT: 62 IN | HEART RATE: 78 BPM | SYSTOLIC BLOOD PRESSURE: 124 MMHG

## 2020-03-02 DIAGNOSIS — D64.9 ANEMIA, UNSPECIFIED TYPE: Primary | ICD-10-CM

## 2020-03-02 DIAGNOSIS — E78.5 HYPERLIPIDEMIA, UNSPECIFIED HYPERLIPIDEMIA TYPE: ICD-10-CM

## 2020-03-02 LAB
BASOPHILS # BLD AUTO: 0.04 10*3/MM3 (ref 0–0.2)
BASOPHILS NFR BLD AUTO: 0.6 % (ref 0–1.5)
CHOLEST SERPL-MCNC: 226 MG/DL (ref 0–200)
EOSINOPHIL # BLD AUTO: 0.28 10*3/MM3 (ref 0–0.4)
EOSINOPHIL NFR BLD AUTO: 4 % (ref 0.3–6.2)
ERYTHROCYTE [DISTWIDTH] IN BLOOD BY AUTOMATED COUNT: 12.7 % (ref 12.3–15.4)
HCT VFR BLD AUTO: 32.3 % (ref 34–46.6)
HDLC SERPL-MCNC: 49 MG/DL (ref 40–60)
HGB BLD-MCNC: 11 G/DL (ref 12–15.9)
IMM GRANULOCYTES # BLD AUTO: 0.02 10*3/MM3 (ref 0–0.05)
IMM GRANULOCYTES NFR BLD AUTO: 0.3 % (ref 0–0.5)
LDLC SERPL CALC-MCNC: 150 MG/DL (ref 0–100)
LDLC/HDLC SERPL: 3.07 {RATIO}
LYMPHOCYTES # BLD AUTO: 1.34 10*3/MM3 (ref 0.7–3.1)
LYMPHOCYTES NFR BLD AUTO: 19.3 % (ref 19.6–45.3)
MCH RBC QN AUTO: 30 PG (ref 26.6–33)
MCHC RBC AUTO-ENTMCNC: 34.1 G/DL (ref 31.5–35.7)
MCV RBC AUTO: 88 FL (ref 79–97)
MONOCYTES # BLD AUTO: 0.52 10*3/MM3 (ref 0.1–0.9)
MONOCYTES NFR BLD AUTO: 7.5 % (ref 5–12)
NEUTROPHILS # BLD AUTO: 4.74 10*3/MM3 (ref 1.7–7)
NEUTROPHILS NFR BLD AUTO: 68.3 % (ref 42.7–76)
NRBC BLD AUTO-RTO: 0 /100 WBC (ref 0–0.2)
PLATELET # BLD AUTO: 393 10*3/MM3 (ref 140–450)
RBC # BLD AUTO: 3.67 10*6/MM3 (ref 3.77–5.28)
TRIGL SERPL-MCNC: 133 MG/DL (ref 0–150)
VLDLC SERPL CALC-MCNC: 26.6 MG/DL (ref 5–40)
WBC # BLD AUTO: 6.94 10*3/MM3 (ref 3.4–10.8)

## 2020-03-02 PROCEDURE — 99214 OFFICE O/P EST MOD 30 MIN: CPT | Performed by: INTERNAL MEDICINE

## 2020-03-02 NOTE — PROGRESS NOTES
Chief Complaint   Patient presents with   • Hypertension   • Hyperlipidemia       History of Present Illness   Dee Jorge is a 75 y.o. female presents for follow up evaluation. She has hyperlipidemia. Elevated cardiac calcium score. She has tried multiple statin medications and these all created some myalgia. zetia gave her upset stomach.   Recent cxr w/ stability and prior area of consolidation likely related to an epicardial fat pad.     The following portions of the patient's history were reviewed and updated as appropriate: allergies, current medications, past family history, past medical history, past social history, past surgical history and problem list.  Current Outpatient Medications on File Prior to Visit   Medication Sig Dispense Refill   • carvedilol (COREG) 12.5 MG tablet Take 1 tablet by mouth 2 (Two) Times a Day With Meals. 180 tablet 2   • ibuprofen (ADVIL,MOTRIN) 200 MG tablet Take 200 mg by mouth Every 8 (Eight) Hours As Needed.     • levalbuterol (XOPENEX HFA) 45 MCG/ACT inhaler Inhale 1-2 puffs Every 4 (Four) Hours As Needed for Wheezing. 1 inhaler 11   • levalbuterol (XOPENEX) 0.63 MG/3ML nebulizer solution Take 1 ampule by nebulization Every 6 (Six) Hours As Needed for Wheezing or Shortness of Air. 30 ampule 12   • levothyroxine (SYNTHROID, LEVOTHROID) 88 MCG tablet Take 1 tablet by mouth Daily. 90 tablet 2   • spironolactone (ALDACTONE) 50 MG tablet Take 1 tablet by mouth Daily. 90 tablet 3   • vitamin B-12 (CYANOCOBALAMIN) 1000 MCG tablet Take 1 tablet by mouth Daily. 90 tablet 4   • [DISCONTINUED] benzonatate (TESSALON PERLES) 100 MG capsule Take 1 capsule by mouth 3 (Three) Times a Day As Needed for Cough. 30 capsule 3   • [DISCONTINUED] doxycycline (MONODOX) 100 MG capsule Take 1 capsule by mouth 2 (Two) Times a Day. 14 capsule 0   • [DISCONTINUED] guaiFENesin-codeine (GUAIFENESIN AC) 100-10 MG/5ML liquid Take 5 mL by mouth 4 (Four) Times a Day As Needed for Cough or Congestion. 180 mL  "0     No current facility-administered medications on file prior to visit.      Review of Systems   Constitutional: Negative.    HENT: Negative.    Eyes: Negative.    Respiratory: Negative.    Cardiovascular: Negative.    Gastrointestinal: Negative.    Endocrine: Negative.    Genitourinary: Negative.    Musculoskeletal: Negative.    Skin: Negative.    Allergic/Immunologic: Negative.    Neurological: Negative.    Hematological: Negative.    Psychiatric/Behavioral: Negative.        Objective   Physical Exam   Constitutional: She is oriented to person, place, and time. She appears well-developed and well-nourished.   HENT:   Head: Normocephalic and atraumatic.   Right Ear: External ear normal.   Left Ear: External ear normal.   Nose: Nose normal.   Mouth/Throat: Oropharynx is clear and moist.   Eyes: Pupils are equal, round, and reactive to light. Conjunctivae and EOM are normal.   Neck: Normal range of motion. Neck supple.   Cardiovascular: Normal rate and regular rhythm.   Pulmonary/Chest: Effort normal and breath sounds normal.   Abdominal: Soft. Bowel sounds are normal.   Musculoskeletal: Normal range of motion.   Neurological: She is alert and oriented to person, place, and time.   Skin: Skin is warm and dry.   Psychiatric: She has a normal mood and affect. Her behavior is normal. Judgment and thought content normal.   Nursing note and vitals reviewed.       /74   Pulse 78   Ht 157.5 cm (62\")   Wt 56.2 kg (124 lb)   LMP  (LMP Unknown)   BMI 22.68 kg/m²     Assessment/Plan   Diagnoses and all orders for this visit:    Anemia, unspecified type  -     CBC & Differential    Hyperlipidemia, unspecified hyperlipidemia type  -     Lipid Panel With LDL / HDL Ratio    patient w/ mild anemia. Started b12 and will continue this. She will increase dietary iron. She will reduce dietary fats. Test lipids today. Will likely rx repatha for lipid reduction. per cxr report she will not need additional testing for this.  " She will f/u here in 6 months or prn.

## 2020-03-09 RX ORDER — FERROUS SULFATE 325(65) MG
325 TABLET ORAL
Qty: 90 TABLET | Refills: 3 | Status: SHIPPED | OUTPATIENT
Start: 2020-03-09 | End: 2021-01-07

## 2020-03-12 DIAGNOSIS — M81.0 AGE-RELATED OSTEOPOROSIS WITHOUT CURRENT PATHOLOGICAL FRACTURE: Primary | ICD-10-CM

## 2020-07-05 RX ORDER — CARVEDILOL 12.5 MG/1
TABLET ORAL
Qty: 180 TABLET | Refills: 0 | Status: SHIPPED | OUTPATIENT
Start: 2020-07-05 | End: 2020-10-08

## 2020-07-28 RX ORDER — FLUTICASONE PROPIONATE 50 MCG
SPRAY, SUSPENSION (ML) NASAL
Qty: 1 BOTTLE | Refills: 5 | Status: SHIPPED | OUTPATIENT
Start: 2020-07-28

## 2020-08-30 RX ORDER — LEVOTHYROXINE SODIUM 88 UG/1
TABLET ORAL
Qty: 90 TABLET | Refills: 0 | Status: SHIPPED | OUTPATIENT
Start: 2020-08-30 | End: 2020-12-01

## 2020-09-02 DIAGNOSIS — Z13.89 ENCOUNTER FOR ROUTINE SCREENING FOR MALFORMATION USING ULTRASONICS: ICD-10-CM

## 2020-09-02 DIAGNOSIS — I10 ESSENTIAL HYPERTENSION: ICD-10-CM

## 2020-09-02 DIAGNOSIS — E03.9 ACQUIRED HYPOTHYROIDISM: ICD-10-CM

## 2020-09-02 DIAGNOSIS — Z00.00 HEALTHCARE MAINTENANCE: ICD-10-CM

## 2020-09-02 DIAGNOSIS — R82.90 NONSPECIFIC FINDING ON EXAMINATION OF URINE: ICD-10-CM

## 2020-09-02 DIAGNOSIS — E78.2 MIXED HYPERLIPIDEMIA: Primary | ICD-10-CM

## 2020-09-15 LAB
ALBUMIN SERPL-MCNC: 4.8 G/DL (ref 3.5–5.2)
ALBUMIN/GLOB SERPL: 2.8 G/DL
ALP SERPL-CCNC: 86 U/L (ref 39–117)
ALT SERPL-CCNC: 14 U/L (ref 1–33)
APPEARANCE UR: CLEAR
AST SERPL-CCNC: 19 U/L (ref 1–32)
BACTERIA #/AREA URNS HPF: NORMAL /HPF
BASOPHILS # BLD AUTO: 0.07 10*3/MM3 (ref 0–0.2)
BASOPHILS NFR BLD AUTO: 0.9 % (ref 0–1.5)
BILIRUB SERPL-MCNC: 0.3 MG/DL (ref 0–1.2)
BILIRUB UR QL STRIP: NEGATIVE
BUN SERPL-MCNC: 20 MG/DL (ref 8–23)
BUN/CREAT SERPL: 28.6 (ref 7–25)
CALCIUM SERPL-MCNC: 9.9 MG/DL (ref 8.6–10.5)
CASTS URNS MICRO: NORMAL
CHLORIDE SERPL-SCNC: 105 MMOL/L (ref 98–107)
CHOLEST SERPL-MCNC: 247 MG/DL (ref 0–200)
CO2 SERPL-SCNC: 25.5 MMOL/L (ref 22–29)
COLOR UR: YELLOW
CREAT SERPL-MCNC: 0.7 MG/DL (ref 0.57–1)
EOSINOPHIL # BLD AUTO: 0.28 10*3/MM3 (ref 0–0.4)
EOSINOPHIL NFR BLD AUTO: 3.5 % (ref 0.3–6.2)
EPI CELLS #/AREA URNS HPF: NORMAL /HPF
ERYTHROCYTE [DISTWIDTH] IN BLOOD BY AUTOMATED COUNT: 13.2 % (ref 12.3–15.4)
GLOBULIN SER CALC-MCNC: 1.7 GM/DL
GLUCOSE SERPL-MCNC: 110 MG/DL (ref 65–99)
GLUCOSE UR QL: NEGATIVE
HCT VFR BLD AUTO: 34.2 % (ref 34–46.6)
HCV AB S/CO SERPL IA: <0.1 S/CO RATIO (ref 0–0.9)
HDLC SERPL-MCNC: 56 MG/DL (ref 40–60)
HGB BLD-MCNC: 11.3 G/DL (ref 12–15.9)
HGB UR QL STRIP: NEGATIVE
IMM GRANULOCYTES # BLD AUTO: 0.03 10*3/MM3 (ref 0–0.05)
IMM GRANULOCYTES NFR BLD AUTO: 0.4 % (ref 0–0.5)
KETONES UR QL STRIP: NEGATIVE
LDLC SERPL CALC-MCNC: 164 MG/DL (ref 0–100)
LEUKOCYTE ESTERASE UR QL STRIP: ABNORMAL
LYMPHOCYTES # BLD AUTO: 1.77 10*3/MM3 (ref 0.7–3.1)
LYMPHOCYTES NFR BLD AUTO: 22.3 % (ref 19.6–45.3)
MCH RBC QN AUTO: 30 PG (ref 26.6–33)
MCHC RBC AUTO-ENTMCNC: 33 G/DL (ref 31.5–35.7)
MCV RBC AUTO: 90.7 FL (ref 79–97)
MONOCYTES # BLD AUTO: 0.56 10*3/MM3 (ref 0.1–0.9)
MONOCYTES NFR BLD AUTO: 7.1 % (ref 5–12)
NEUTROPHILS # BLD AUTO: 5.22 10*3/MM3 (ref 1.7–7)
NEUTROPHILS NFR BLD AUTO: 65.8 % (ref 42.7–76)
NITRITE UR QL STRIP: NEGATIVE
NRBC BLD AUTO-RTO: 0.1 /100 WBC (ref 0–0.2)
PH UR STRIP: 6 [PH] (ref 5–8)
PLATELET # BLD AUTO: 382 10*3/MM3 (ref 140–450)
POTASSIUM SERPL-SCNC: 4.4 MMOL/L (ref 3.5–5.2)
PROT SERPL-MCNC: 6.5 G/DL (ref 6–8.5)
PROT UR QL STRIP: NEGATIVE
RBC # BLD AUTO: 3.77 10*6/MM3 (ref 3.77–5.28)
RBC #/AREA URNS HPF: NORMAL /HPF
SODIUM SERPL-SCNC: 141 MMOL/L (ref 136–145)
SP GR UR: 1.01 (ref 1–1.03)
TRIGL SERPL-MCNC: 134 MG/DL (ref 0–150)
TSH SERPL DL<=0.005 MIU/L-ACNC: 1.31 UIU/ML (ref 0.27–4.2)
UROBILINOGEN UR STRIP-MCNC: ABNORMAL MG/DL
VLDLC SERPL CALC-MCNC: 26.8 MG/DL
WBC # BLD AUTO: 7.93 10*3/MM3 (ref 3.4–10.8)
WBC #/AREA URNS HPF: NORMAL /HPF

## 2020-09-21 ENCOUNTER — OFFICE VISIT (OUTPATIENT)
Dept: INTERNAL MEDICINE | Facility: CLINIC | Age: 75
End: 2020-09-21

## 2020-09-21 VITALS
BODY MASS INDEX: 23.96 KG/M2 | RESPIRATION RATE: 18 BRPM | HEIGHT: 62 IN | WEIGHT: 130.2 LBS | HEART RATE: 76 BPM | OXYGEN SATURATION: 94 % | SYSTOLIC BLOOD PRESSURE: 134 MMHG | DIASTOLIC BLOOD PRESSURE: 82 MMHG | TEMPERATURE: 98.1 F

## 2020-09-21 DIAGNOSIS — I10 ESSENTIAL HYPERTENSION: ICD-10-CM

## 2020-09-21 DIAGNOSIS — E78.2 MIXED HYPERLIPIDEMIA: ICD-10-CM

## 2020-09-21 DIAGNOSIS — R93.1 HIGH CORONARY ARTERY CALCIUM SCORE: ICD-10-CM

## 2020-09-21 DIAGNOSIS — E03.9 ACQUIRED HYPOTHYROIDISM: ICD-10-CM

## 2020-09-21 DIAGNOSIS — Z23 NEED FOR INFLUENZA VACCINATION: ICD-10-CM

## 2020-09-21 DIAGNOSIS — Z00.00 HEALTHCARE MAINTENANCE: Primary | ICD-10-CM

## 2020-09-21 PROCEDURE — 90694 VACC AIIV4 NO PRSRV 0.5ML IM: CPT | Performed by: INTERNAL MEDICINE

## 2020-09-21 PROCEDURE — G0008 ADMIN INFLUENZA VIRUS VAC: HCPCS | Performed by: INTERNAL MEDICINE

## 2020-09-21 PROCEDURE — G0439 PPPS, SUBSEQ VISIT: HCPCS | Performed by: INTERNAL MEDICINE

## 2020-09-21 PROCEDURE — 99214 OFFICE O/P EST MOD 30 MIN: CPT | Performed by: INTERNAL MEDICINE

## 2020-09-21 RX ORDER — EVOLOCUMAB 140 MG/ML
140 INJECTION, SOLUTION SUBCUTANEOUS
Qty: 2.1 ML | Refills: 6 | Status: SHIPPED | OUTPATIENT
Start: 2020-09-21 | End: 2021-01-07

## 2020-09-21 NOTE — PROGRESS NOTES
Subjective   AWV  Focus challenge  Hypothyroidism  htn  Hyperlipidemia      Dee Jorge is a 75 y.o. female who presents for an annual wellness visit as well as check up of hyperlipidemia, hypothyroidism, hypertension.     History of Present Illness   Patient is doing well today. She has normal thyroid levels. She is normotensive. Lipids are elevated. She does not tolerate statin medications. zetia gave her diarrhea. She has been to cardiologist and PCSK9 inhibitor advised.   Patient has concerns of recent focus challenges. She has had some stressors and loses focus when trying to accomplish many tasks. Her memory is good. Thyroid level is euthryoid.       Review of Systems   Constitutional: Negative.    HENT: Negative.    Eyes: Negative.    Respiratory: Negative.    Cardiovascular: Negative.    Gastrointestinal: Negative.    Endocrine: Negative.    Genitourinary: Negative.    Musculoskeletal: Negative.    Skin: Negative.    Allergic/Immunologic: Negative.    Neurological: Negative.    Hematological: Negative.    Psychiatric/Behavioral: Negative.        The following portions of the patient's history were reviewed and updated as appropriate: allergies, current medications, past family history, past medical history, past social history, past surgical history and problem list.  Health maintenance tab was reviewed and updated with the patient.       Patient Active Problem List    Diagnosis Date Noted   • Occult blood positive stool 11/07/2019     Note Last Updated: 11/7/2019     Added automatically from request for surgery [id number]     • High coronary artery calcium score 11/15/2018   • Hearing loss 09/12/2018   • Age-related osteoporosis without current pathological fracture 08/20/2018   • Foot trauma 02/13/2017   • Leg edema, left 04/18/2016   • Anxiety 02/15/2016   • Mixed hyperlipidemia 02/15/2016   • Essential hypertension 02/15/2016   • Hypothyroidism 02/15/2016   • Pain in shoulder 02/15/2016       Past Medical  History:   Diagnosis Date   • Abnormal electrocardiogram (ECG) (EKG) 08/28/2018   • Actinic keratosis    • Adnexal mass 07/2008    RIGHT   • Anemia    • Anxiety    • Aortic regurgitation    • Arthritis    • Asthma    • 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   • Bronchitis    • CAD (coronary artery disease)    • Cataract 10/05/2016    BILATERAL   • Constipation    • Cystocele    • Fatigue    • Foot trauma, left, initial encounter 02/13/2017   • Fracture of patella    • Hearing loss    • Hyperlipidemia    • Hypertension    • Hypothyroidism     ACQUIRED   • Insomnia    • Mastodynia 09/29/2015   • OA (osteoarthritis)    • Osteoporosis    • Patella fracture 2010    LEFT   • Positive occult stool blood test 10/04/2019   • Postmenopausal    • Rectocele    • Right shoulder strain 08/31/2002    D/T FALL, SEEN AT [facility] ER   • Rotator cuff tear, right 12/05/2014    SAW DR. CHARLES BARTLETT   • Seborrheic keratosis    • Trigger finger, left middle finger 02/2016       Past Surgical History:   Procedure Laterality Date   • ANTERIOR AND POSTERIOR VAGINAL REPAIR N/A 09/21/2011    RECTOCELE AND CYSTOCELE REPAIR, PLACEMENT OF PROLIFT GRAFT, DR. LISA PALM AT [facility]   • BUNIONECTOMY Left 08/02/2010    LEFT MEDIAL EXTRA-ARTICULAR GANGLION CYST REMOVAL AND FIRST MTP CHILECTOMY WITH SYNOVECTOMY, DR. BHAVANI BABCOCK AT [facility]   • CHOLECYSTECTOMY N/A 1992    DR. QUINCY CEE   • COLONOSCOPY N/A 09/18/2015    ENTIRE COLON WNL, RESCOPE IN 10 YRS, DR. QUINCY VILLARREAL AT [place]   • COLONOSCOPY N/A 07/20/2005    WNL, DR. MAY VAZQUEZ AT [facility]   • COLONOSCOPY N/A 1/22/2020    ENTIRE COLON WNL, RESCOPE IN 10 YRS, DR. CHINO MONROE AT [facility]   • HERNIA REPAIR Bilateral 1972    DR. AIDEN ARZATE   • HYSTERECTOMY N/A 1984    DR. ZEKE BLANDON   • KNEE ARTHROSCOPY Right    • KNEE CARTILAGE SURGERY Right 08/21/2012    RIGHT CHONDROPLASTY OF PATELLA AND TROCHLEA, CHONDROPLASTY MEDIAL FEMORAL CONDYLE, CHONDROPLASTY LATERAL TIBIAL PLATEAU AND LATERAL FEMORAL CONDYLE,  SUBTOTAL LATERAL MENISCECTOMY, OSTEOPLASTY ANTERIOR TIBIAL INTERCONDYLAR NOTCH, DR. BHAVANI BABCOCK AT [facility]   • NASAL ENDOSCOPY N/A 03/11/2015    ANTERIOR RHINOSCOPY, CONGESTION OF NASAL MUCOSA, DR. BRENDAN CHAMPION   • SALPINGO OOPHORECTOMY Right 07/30/2008    FOR ADNEXAL MASS, DR. LISA PALM AT [facility]   • TOTAL HIP ARTHROPLASTY Left 05/06/2013    DR.REID PALM AT [facility]   • TOTAL HIP ARTHROPLASTY Right 10/13/2008    DR. KELVIN TARIQ AT [facility]   • TOTAL KNEE ARTHROPLASTY Left 01/20/2014    DR. GORDO PALM AT [facility]       Family History   Problem Relation Age of Onset   • Hypertension Mother    • Thyroid disease Mother    • Stroke Mother    • Asthma Mother    • Asthma Father    • Emphysema Father    • Alcohol abuse Father    • Hypertension Father    • COPD Father    • Thyroid disease Daughter    • Diabetes type I Daughter    • Diabetes Daughter    • Stroke Maternal Grandfather         ischemic   • Kidney disease Sister    • Hypertension Sister    • Twan's disease Grandchild    • Asthma Grandchild    • Breast cancer Neg Hx        Social History     Socioeconomic History   • Marital status:      Spouse name: Not on file   • Number of children: Not on file   • Years of education: Not on file   • Highest education level: Not on file   Tobacco Use   • Smoking status: Never Smoker   • Smokeless tobacco: Never Used   Substance and Sexual Activity   • Alcohol use: No     Comment: daily caffiene   • Drug use: No   • Sexual activity: Defer     Birth control/protection: Post-menopausal, Surgical       Current Outpatient Medications on File Prior to Visit   Medication Sig Dispense Refill   • carvedilol (COREG) 12.5 MG tablet TAKE ONE TABLET BY MOUTH TWICE A DAY WITH MEALS 180 tablet 0   • fluticasone (FLONASE) 50 MCG/ACT nasal spray SPRAY TWO SPRAYS IN EACH NOSTRIL ONCE DAILY 1 bottle 5   • levalbuterol (XOPENEX HFA) 45 MCG/ACT inhaler Inhale 1-2 puffs Every 4 (Four) Hours As Needed for Wheezing. 1 inhaler 11   •  "levalbuterol (XOPENEX) 0.63 MG/3ML nebulizer solution Take 1 ampule by nebulization Every 6 (Six) Hours As Needed for Wheezing or Shortness of Air. 30 ampule 12   • levothyroxine (SYNTHROID, LEVOTHROID) 88 MCG tablet TAKE ONE TABLET BY MOUTH DAILY 90 tablet 0   • spironolactone (ALDACTONE) 50 MG tablet Take 1 tablet by mouth Daily. 90 tablet 3   • vitamin B-12 (CYANOCOBALAMIN) 1000 MCG tablet Take 1 tablet by mouth Daily. 90 tablet 4   • ferrous sulfate 325 (65 FE) MG tablet Take 1 tablet by mouth Daily With Breakfast. 90 tablet 3     No current facility-administered medications on file prior to visit.        Allergies   Allergen Reactions   • Statins Myalgia   • Codeine Nausea And Vomiting   • Penicillins Hives   • Sulfa Antibiotics Nausea And Vomiting and Other (See Comments)     HEADACHES       Immunization History   Administered Date(s) Administered   • FLUAD TRI 65YR+ 09/16/2019   • Flu Mist 08/31/2015   • Fluad Quad 65+ 09/21/2020   • Fluzone High Dose =>65 Years ([employer] ONLY) 11/27/2018   • Pneumococcal Conjugate 13-Valent (PCV13) 08/08/2016   • Pneumococcal Polysaccharide (PPSV23) 01/01/2009, 11/27/2018   • Td 07/15/2014   • Zostavax 01/01/2008       Objective     /82 (BP Location: Left arm, Patient Position: Sitting, Cuff Size: Adult)   Pulse 76   Temp 98.1 °F (36.7 °C) (Oral)   Resp 18   Ht 157.5 cm (62.01\")   Wt 59.1 kg (130 lb 3.2 oz)   LMP  (LMP Unknown)   SpO2 94%   Breastfeeding No   BMI 23.81 kg/m²     Physical Exam  Vitals signs and nursing note reviewed.   Constitutional:       Appearance: Normal appearance. She is well-developed.   HENT:      Head: Normocephalic and atraumatic.      Right Ear: Tympanic membrane and external ear normal.      Left Ear: Tympanic membrane and external ear normal.      Nose: Nose normal.      Mouth/Throat:      Mouth: Mucous membranes are moist.   Eyes:      Pupils: Pupils are equal, round, and reactive to light.   Neck:      Musculoskeletal: Normal " range of motion and neck supple.   Cardiovascular:      Rate and Rhythm: Normal rate and regular rhythm.      Heart sounds: Normal heart sounds.   Pulmonary:      Effort: Pulmonary effort is normal. No respiratory distress.      Breath sounds: Normal breath sounds.   Abdominal:      General: Abdomen is flat.      Palpations: Abdomen is soft.   Musculoskeletal: Normal range of motion.   Skin:     General: Skin is warm and dry.   Neurological:      Mental Status: She is alert and oriented to person, place, and time.   Psychiatric:         Mood and Affect: Mood normal.         Behavior: Behavior normal.         Thought Content: Thought content normal.         Judgment: Judgment normal.         Assessment/Plan   Dee was seen today for medicare wellness-subsequent.    Diagnoses and all orders for this visit:    Healthcare maintenance    Need for influenza vaccination  -     Fluad Quad >65 years    Essential hypertension    High coronary artery calcium score    Mixed hyperlipidemia    Acquired hypothyroidism    Other orders  -     Evolocumab (Repatha) 140 MG/ML solution prefilled syringe; Inject 1 mL under the skin into the appropriate area as directed Every 14 (Fourteen) Days.        Discussion    AWV.  See below for mera history, PHQ-9, functional ability questionnaire, cognitive impairment screening.  Direct observation of cognitive abilities:  Normal   These were all reviewed with the patient and the patient was provided with a personal prevention plan of service in patient instructions.  Patient was given advice or handouts on the following topics:  nutrition, fall prevention, exercise, weight management.   ACP discussion was held with the patient during this visit. Patient has an advance directive (not in EMR), copy requested..    I have recommended that the patient get the following immunizations:  Shingrix.  She will start repatha for dyslipidemia and cad. She will continue current meds for hypothyroidism and  hypertension. Encouraged a healthy diet w/ routine fitness. To f/u cardiology routinely. F/u in office in 6-8 mo or prn.       Patient   Depression Screen:    PHQ-2/PHQ-9 Depression Screening 9/21/2020   Little interest or pleasure in doing things 0   Feeling down, depressed, or hopeless 0   Total Score 0       Fall Risk Screen:  DINO Fall Risk Assessment was completed, and patient is at LOW risk for falls.Assessment completed on:9/21/2020    Health Habits and Functional/Cognitive screen:  Functional & Cognitive Status 9/21/2020   Do you have difficulty preparing food and eating? No   Do you have difficulty bathing yourself, getting dressed or grooming yourself? No   Do you have difficulty using the toilet? No   Do you have difficulty moving around from place to place? No   Do you have trouble with steps or getting out of a bed or a chair? No   Current Diet Well Balanced Diet   Dental Exam Up to date   Eye Exam Up to date   Exercise (times per week) 3 times per week   Current Exercise Activities Include Yoga   Do you need help using the phone?  No   Are you deaf or do you have serious difficulty hearing?  No   Do you need help with transportation? No   Do you need help shopping? No   Do you need help preparing meals?  No   Do you need help with housework?  No   Do you need help with laundry? No   Do you need help taking your medications? No   Do you need help managing money? No   Do you ever drive or ride in a car without wearing a seat belt? No   Have you felt unusual stress, anger or loneliness in the last month? No   Who do you live with? Spouse   If you need help, do you have trouble finding someone available to you? No   Have you been bothered in the last four weeks by sexual problems? No   Do you have difficulty concentrating, remembering or making decisions? Yes       Health Maintenance   Topic Date Due   • TDAP/TD VACCINES (1 - Tdap) 01/27/1964   • ZOSTER VACCINE (2 of 3) 02/26/2008   • DXA SCAN   08/21/2019   • MAMMOGRAM  09/09/2021   • LIPID PANEL  09/14/2021   • MEDICARE ANNUAL WELLNESS  09/21/2021   • COLONOSCOPY  01/22/2030   • HEPATITIS C SCREENING  Completed   • Pneumococcal Vaccine Once at 65 Years Old  Completed   • INFLUENZA VACCINE  Completed            Future Appointments   Date Time Provider Department Center   11/19/2020  1:40 PM Jasmin Mehta MD [facility] None

## 2020-10-05 PROBLEM — I25.10 CAD (CORONARY ARTERY DISEASE): Status: ACTIVE | Noted: 2020-10-05

## 2020-10-08 RX ORDER — CARVEDILOL 12.5 MG/1
TABLET ORAL
Qty: 180 TABLET | Refills: 0 | Status: SHIPPED | OUTPATIENT
Start: 2020-10-08 | End: 2021-01-10

## 2020-10-22 ENCOUNTER — APPOINTMENT (OUTPATIENT)
Dept: GENERAL RADIOLOGY | Facility: HOSPITAL | Age: 75
End: 2020-10-22

## 2020-10-22 PROCEDURE — 73610 X-RAY EXAM OF ANKLE: CPT | Performed by: GENERAL PRACTICE

## 2020-12-01 RX ORDER — LEVOTHYROXINE SODIUM 88 UG/1
TABLET ORAL
Qty: 90 TABLET | Refills: 0 | Status: SHIPPED | OUTPATIENT
Start: 2020-12-01 | End: 2021-03-07

## 2021-01-07 ENCOUNTER — OFFICE VISIT (OUTPATIENT)
Dept: CARDIOLOGY | Facility: CLINIC | Age: 76
End: 2021-01-07

## 2021-01-07 VITALS
HEART RATE: 67 BPM | TEMPERATURE: 96.2 F | DIASTOLIC BLOOD PRESSURE: 80 MMHG | WEIGHT: 128 LBS | SYSTOLIC BLOOD PRESSURE: 128 MMHG | BODY MASS INDEX: 23.55 KG/M2 | OXYGEN SATURATION: 97 % | HEIGHT: 62 IN

## 2021-01-07 DIAGNOSIS — E78.2 MIXED HYPERLIPIDEMIA: ICD-10-CM

## 2021-01-07 DIAGNOSIS — I10 ESSENTIAL HYPERTENSION: ICD-10-CM

## 2021-01-07 DIAGNOSIS — F41.9 ANXIETY: ICD-10-CM

## 2021-01-07 DIAGNOSIS — R93.1 HIGH CORONARY ARTERY CALCIUM SCORE: Primary | ICD-10-CM

## 2021-01-07 PROCEDURE — 93000 ELECTROCARDIOGRAM COMPLETE: CPT | Performed by: INTERNAL MEDICINE

## 2021-01-07 PROCEDURE — 99214 OFFICE O/P EST MOD 30 MIN: CPT | Performed by: INTERNAL MEDICINE

## 2021-01-07 RX ORDER — LEVALBUTEROL INHALATION SOLUTION 0.63 MG/3ML
1 SOLUTION RESPIRATORY (INHALATION) EVERY 6 HOURS PRN
COMMUNITY
End: 2023-01-12 | Stop reason: ALTCHOICE

## 2021-01-07 NOTE — PROGRESS NOTES
PATIENTINFORMATION    Date of Office Visit: 21  Encounter Provider: Jasmin Mehta MD  Place of Service: [facility] CARDIOLOGY  Patient Name: Dee Jorge  : 1945    Subjective:         Patient ID: Dee Jorge is a 75 y.o. female.      History of Present Illness       This is a woman whom I saw in the remote past and then came back to see me 18. She has a history of hypertension and hyperlipidemia but is statin intolerant. She says she has tried about 5 different statins and cannot tolerate them. She had an echocardiogram in 2018 which showed mild asymmetric septal hypertrophy, normal LV systolic function with an ejection fraction of 59%, grade I diastolic dysfunction, calcified aortic valve with mild-to-moderate aortic regurgitation. She had a calcium score of 404 on 18.     Review of Systems   Constitution: Negative for fever, malaise/fatigue, weight gain and weight loss.   HENT: Negative for ear pain, hearing loss, nosebleeds and sore throat.    Eyes: Negative for double vision, pain, vision loss in left eye and vision loss in right eye.   Cardiovascular:        See history of present illness.   Respiratory: Negative for cough, shortness of breath, sleep disturbances due to breathing, snoring and wheezing.    Endocrine: Negative for cold intolerance, heat intolerance and polyuria.   Skin: Negative for itching, poor wound healing and rash.   Musculoskeletal: Positive for joint pain. Negative for joint swelling and myalgias.   Gastrointestinal: Negative for abdominal pain, diarrhea, hematochezia, nausea and vomiting.   Genitourinary: Negative for hematuria and hesitancy.   Neurological: Negative for numbness, paresthesias and seizures.   Psychiatric/Behavioral: Negative for depression. The patient is nervous/anxious.            ECG 12 Lead    Date/Time: 2021 3:18 PM  Performed by: Jasmin Mehta MD  Authorized by: Jasmin Mehta MD  "  Comparison: compared with previous ECG from 11/14/2019  Similar to previous ECG  Rhythm: sinus rhythm  BPM: 73  Conduction: conduction normal  ST Segments: ST segments normal  T Waves: T waves normal    Clinical impression: normal ECG               Objective:     /80 (BP Location: Left arm)   Pulse 67   Temp 96.2 °F (35.7 °C)   Ht 157.5 cm (62\")   Wt 58.1 kg (128 lb)   LMP  (LMP Unknown)   SpO2 97%   BMI 23.41 kg/m²  Body mass index is 23.41 kg/m².     Physical Exam    Lab Review:       Assessment/Plan:       1.  Coronary artery disease.  No anginal symptoms.  She denies chest pain or shortness of breath.  Continue current medications.  She does not tolerate statins.  Zetia caused diarrhea.  2.  Hypertension.  Controlled  3.  Hyperlipidemia.  As above.  Not tolerated multiple medications.  4.  Mild to moderate aortic regurgitation.  I do not hear significant murmur today.  Possible recheck of echocardiogram tomorrow or sooner if her symptoms change.    I will see her back in 1 year.  No orders of the defined types were placed in this encounter.       Discharge Medications          Accurate as of January 7, 2021  3:16 PM. If you have any questions, ask your nurse or doctor.            Changes to Medications      Instructions Start Date   levalbuterol 0.63 MG/3ML nebulizer solution  Commonly known as: XOPENEX  What changed: Another medication with the same name was removed. Continue taking this medication, and follow the directions you see here.  Changed by: Jasmin Mehta MD   1 ampule, Nebulization, Every 6 Hours PRN         Continue These Medications      Instructions Start Date   carvedilol 12.5 MG tablet  Commonly known as: COREG   TAKE ONE TABLET BY MOUTH TWICE A DAY WITH MEALS      fluticasone 50 MCG/ACT nasal spray  Commonly known as: FLONASE   SPRAY TWO SPRAYS IN EACH NOSTRIL ONCE DAILY      levalbuterol 45 MCG/ACT inhaler  Commonly known as: Xopenex HFA   1-2 puffs, Inhalation, Every 4 " Hours PRN      levothyroxine 88 MCG tablet  Commonly known as: SYNTHROID, LEVOTHROID   TAKE ONE TABLET BY MOUTH DAILY      vitamin B-12 1000 MCG tablet  Commonly known as: CYANOCOBALAMIN   1,000 mcg, Oral, Daily         Stop These Medications    ferrous sulfate 325 (65 FE) MG tablet  Stopped by: Jasmin Mehta MD     Repatha solution prefilled syringe injection  Generic drug: Evolocumab  Stopped by: Jasmin Mehta MD     spironolactone 50 MG tablet  Commonly known as: ALDACTONE  Stopped by: MD Jasmin Osuna MD  01/07/21  15:16 EST

## 2021-01-10 RX ORDER — CARVEDILOL 12.5 MG/1
TABLET ORAL
Qty: 180 TABLET | Refills: 0 | Status: SHIPPED | OUTPATIENT
Start: 2021-01-10 | End: 2021-03-01 | Stop reason: SDUPTHER

## 2021-02-25 ENCOUNTER — TELEPHONE (OUTPATIENT)
Dept: INTERNAL MEDICINE | Facility: CLINIC | Age: 76
End: 2021-02-25

## 2021-02-25 NOTE — TELEPHONE ENCOUNTER
Patient called in stating her  was diagnosed with something that can lead to cancer. Patient wants to know if she can start a medication for anxiety, she's not taking it so well.    Best call back # [phone]

## 2021-03-01 ENCOUNTER — OFFICE VISIT (OUTPATIENT)
Dept: INTERNAL MEDICINE | Facility: CLINIC | Age: 76
End: 2021-03-01

## 2021-03-01 ENCOUNTER — TELEPHONE (OUTPATIENT)
Dept: INTERNAL MEDICINE | Facility: CLINIC | Age: 76
End: 2021-03-01

## 2021-03-01 VITALS
BODY MASS INDEX: 23.74 KG/M2 | HEIGHT: 62 IN | DIASTOLIC BLOOD PRESSURE: 80 MMHG | WEIGHT: 129 LBS | SYSTOLIC BLOOD PRESSURE: 166 MMHG | HEART RATE: 79 BPM

## 2021-03-01 DIAGNOSIS — F41.9 ANXIETY: Primary | ICD-10-CM

## 2021-03-01 DIAGNOSIS — R00.0 TACHYCARDIA: ICD-10-CM

## 2021-03-01 DIAGNOSIS — I10 ESSENTIAL HYPERTENSION: ICD-10-CM

## 2021-03-01 PROCEDURE — 99214 OFFICE O/P EST MOD 30 MIN: CPT | Performed by: INTERNAL MEDICINE

## 2021-03-01 RX ORDER — ESCITALOPRAM OXALATE 5 MG/1
5 TABLET ORAL DAILY
Qty: 90 TABLET | Refills: 1 | Status: SHIPPED | OUTPATIENT
Start: 2021-03-01 | End: 2022-01-13 | Stop reason: ALTCHOICE

## 2021-03-01 RX ORDER — CARVEDILOL 12.5 MG/1
18.75 TABLET ORAL 2 TIMES DAILY WITH MEALS
Qty: 210 TABLET | Refills: 3 | Status: SHIPPED | OUTPATIENT
Start: 2021-03-01 | End: 2022-05-31

## 2021-03-01 RX ORDER — ESCITALOPRAM OXALATE 5 MG/1
5 TABLET ORAL DAILY
Qty: 90 TABLET | Refills: 1 | Status: SHIPPED | OUTPATIENT
Start: 2021-03-01 | End: 2021-03-01 | Stop reason: SDUPTHER

## 2021-03-01 NOTE — TELEPHONE ENCOUNTER
Caller: Dee Jorge    Relationship: Self    Best call back number: [phone]     What form or medical record are you requesting: HANDICAP PAPERWORK    Who is requesting this form or medical record from you: HANDICAP PLACARD     How would you like to receive the form or medical records (pick-up, mail, fax): MAIL  If mail, what is the address: [address] [place] [address]      Timeframe paperwork needed: ASAP    Additional notes: THE PATIENT FORGOT TO ASK ABOUT IT AT THE END OF THE APPOINTMENT TODAY 03/01/21. THE PATIENT WOULD LIKE TO BE NOTIFIED IF THIS IS NOT POSSIBLE.

## 2021-03-01 NOTE — PROGRESS NOTES
Chief Complaint   Patient presents with   • Anxiety       History of Present Illness   Dee Jorge is a 76 y.o. female presents for an acute care visit. Patient's spouse recently diagnosed with leukemia. He has never been ill in the past and this is quite difficult for her to watch.   Patient also notes that her grandson was recently diagnosed with bipolar disorder. She also worries about him.   She has not ever taken anything for anxiety but right now she does feel quite anxious. Patient is working 3 days a week and walking her dog every morning.     The following portions of the patient's history were reviewed and updated as appropriate: allergies, current medications, past family history, past medical history, past social history, past surgical history and problem list.  Current Outpatient Medications on File Prior to Visit   Medication Sig Dispense Refill   • carvedilol (COREG) 12.5 MG tablet TAKE ONE TABLET BY MOUTH TWICE A DAY WITH MEALS 180 tablet 0   • fluticasone (FLONASE) 50 MCG/ACT nasal spray SPRAY TWO SPRAYS IN EACH NOSTRIL ONCE DAILY 1 bottle 5   • levalbuterol (XOPENEX HFA) 45 MCG/ACT inhaler Inhale 1-2 puffs Every 4 (Four) Hours As Needed for Wheezing. 1 inhaler 11   • levalbuterol (XOPENEX) 0.63 MG/3ML nebulizer solution Take 1 ampule by nebulization Every 6 (Six) Hours As Needed for Wheezing.     • levothyroxine (SYNTHROID, LEVOTHROID) 88 MCG tablet TAKE ONE TABLET BY MOUTH DAILY 90 tablet 0   • vitamin B-12 (CYANOCOBALAMIN) 1000 MCG tablet Take 1 tablet by mouth Daily. 90 tablet 4     No current facility-administered medications on file prior to visit.      Review of Systems   Constitutional: Negative.    HENT: Negative.    Eyes: Negative.    Respiratory: Negative.    Cardiovascular: Positive for palpitations.   Gastrointestinal: Negative.    Endocrine: Negative.    Genitourinary: Negative.    Musculoskeletal: Negative.    Skin: Negative.    Allergic/Immunologic: Negative.    Neurological:  "Negative.    Hematological: Negative.    Psychiatric/Behavioral: The patient is nervous/anxious.        Objective   Physical Exam  Vitals signs and nursing note reviewed.   Constitutional:       Appearance: Normal appearance.   HENT:      Head: Normocephalic and atraumatic.      Right Ear: Tympanic membrane normal.      Left Ear: Tympanic membrane normal.      Nose: Nose normal.      Mouth/Throat:      Mouth: Mucous membranes are moist.   Eyes:      Extraocular Movements: Extraocular movements intact.      Pupils: Pupils are equal, round, and reactive to light.   Neck:      Musculoskeletal: Normal range of motion and neck supple.   Cardiovascular:      Rate and Rhythm: Tachycardia present.      Heart sounds: Normal heart sounds.   Pulmonary:      Effort: Pulmonary effort is normal.      Breath sounds: Normal breath sounds.   Abdominal:      General: Abdomen is flat.      Palpations: Abdomen is soft.   Musculoskeletal: Normal range of motion.   Skin:     General: Skin is warm and dry.   Neurological:      General: No focal deficit present.      Mental Status: She is alert and oriented to person, place, and time.   Psychiatric:         Mood and Affect: Mood normal.         Behavior: Behavior normal.         Thought Content: Thought content normal.         Judgment: Judgment normal.          /80   Pulse 79   Ht 157.5 cm (62\")   Wt 58.5 kg (129 lb)   LMP  (LMP Unknown)   BMI 23.59 kg/m²     Assessment/Plan   Diagnoses and all orders for this visit:    Anxiety    Essential hypertension    Tachycardia    Other orders  -     escitalopram (Lexapro) 5 MG tablet; Take 1 tablet by mouth Daily.        Patient with anxiety. She will start lexapro 5 mg daily. She will increase her carvedilol to 1.5 am and 1 hs. She will monitor her blood pressure. She will start meditation. She will start therapy through her [facility]. She will follow up in 1 month or prn.          "

## 2021-03-07 RX ORDER — LEVOTHYROXINE SODIUM 88 UG/1
TABLET ORAL
Qty: 90 TABLET | Refills: 0 | Status: SHIPPED | OUTPATIENT
Start: 2021-03-07 | End: 2021-06-07

## 2021-03-17 DIAGNOSIS — I10 ESSENTIAL HYPERTENSION: ICD-10-CM

## 2021-03-17 DIAGNOSIS — E03.9 ACQUIRED HYPOTHYROIDISM: ICD-10-CM

## 2021-03-17 DIAGNOSIS — E78.2 MIXED HYPERLIPIDEMIA: Primary | ICD-10-CM

## 2021-03-22 LAB
ALBUMIN SERPL-MCNC: 4.4 G/DL (ref 3.5–5.2)
ALBUMIN/GLOB SERPL: 2.2 G/DL
ALP SERPL-CCNC: 90 U/L (ref 39–117)
ALT SERPL-CCNC: 13 U/L (ref 1–33)
AST SERPL-CCNC: 18 U/L (ref 1–32)
BASOPHILS # BLD AUTO: 0.06 10*3/MM3 (ref 0–0.2)
BASOPHILS NFR BLD AUTO: 0.9 % (ref 0–1.5)
BILIRUB SERPL-MCNC: 0.4 MG/DL (ref 0–1.2)
BUN SERPL-MCNC: 16 MG/DL (ref 8–23)
BUN/CREAT SERPL: 26.7 (ref 7–25)
CALCIUM SERPL-MCNC: 9.7 MG/DL (ref 8.6–10.5)
CHLORIDE SERPL-SCNC: 104 MMOL/L (ref 98–107)
CHOLEST SERPL-MCNC: 230 MG/DL (ref 0–200)
CO2 SERPL-SCNC: 24.7 MMOL/L (ref 22–29)
CREAT SERPL-MCNC: 0.6 MG/DL (ref 0.57–1)
EOSINOPHIL # BLD AUTO: 0.26 10*3/MM3 (ref 0–0.4)
EOSINOPHIL NFR BLD AUTO: 4 % (ref 0.3–6.2)
ERYTHROCYTE [DISTWIDTH] IN BLOOD BY AUTOMATED COUNT: 13.2 % (ref 12.3–15.4)
GLOBULIN SER CALC-MCNC: 2 GM/DL
GLUCOSE SERPL-MCNC: 105 MG/DL (ref 65–99)
HCT VFR BLD AUTO: 33.6 % (ref 34–46.6)
HDLC SERPL-MCNC: 61 MG/DL (ref 40–60)
HGB BLD-MCNC: 11.2 G/DL (ref 12–15.9)
IMM GRANULOCYTES # BLD AUTO: 0.02 10*3/MM3 (ref 0–0.05)
IMM GRANULOCYTES NFR BLD AUTO: 0.3 % (ref 0–0.5)
LDLC SERPL CALC-MCNC: 152 MG/DL (ref 0–100)
LYMPHOCYTES # BLD AUTO: 1.51 10*3/MM3 (ref 0.7–3.1)
LYMPHOCYTES NFR BLD AUTO: 23.1 % (ref 19.6–45.3)
MCH RBC QN AUTO: 29.9 PG (ref 26.6–33)
MCHC RBC AUTO-ENTMCNC: 33.3 G/DL (ref 31.5–35.7)
MCV RBC AUTO: 89.6 FL (ref 79–97)
MONOCYTES # BLD AUTO: 0.47 10*3/MM3 (ref 0.1–0.9)
MONOCYTES NFR BLD AUTO: 7.2 % (ref 5–12)
NEUTROPHILS # BLD AUTO: 4.22 10*3/MM3 (ref 1.7–7)
NEUTROPHILS NFR BLD AUTO: 64.5 % (ref 42.7–76)
NRBC BLD AUTO-RTO: 0 /100 WBC (ref 0–0.2)
PLATELET # BLD AUTO: 352 10*3/MM3 (ref 140–450)
POTASSIUM SERPL-SCNC: 4 MMOL/L (ref 3.5–5.2)
PROT SERPL-MCNC: 6.4 G/DL (ref 6–8.5)
RBC # BLD AUTO: 3.75 10*6/MM3 (ref 3.77–5.28)
SODIUM SERPL-SCNC: 139 MMOL/L (ref 136–145)
TRIGL SERPL-MCNC: 94 MG/DL (ref 0–150)
TSH SERPL DL<=0.005 MIU/L-ACNC: 1.01 UIU/ML (ref 0.27–4.2)
VLDLC SERPL CALC-MCNC: 17 MG/DL (ref 5–40)
WBC # BLD AUTO: 6.54 10*3/MM3 (ref 3.4–10.8)

## 2021-03-29 ENCOUNTER — OFFICE VISIT (OUTPATIENT)
Dept: INTERNAL MEDICINE | Facility: CLINIC | Age: 76
End: 2021-03-29

## 2021-03-29 VITALS
DIASTOLIC BLOOD PRESSURE: 80 MMHG | HEART RATE: 67 BPM | WEIGHT: 129 LBS | HEIGHT: 62 IN | BODY MASS INDEX: 23.74 KG/M2 | SYSTOLIC BLOOD PRESSURE: 180 MMHG

## 2021-03-29 DIAGNOSIS — I10 ESSENTIAL HYPERTENSION: Primary | ICD-10-CM

## 2021-03-29 DIAGNOSIS — E78.2 MIXED HYPERLIPIDEMIA: ICD-10-CM

## 2021-03-29 DIAGNOSIS — E03.9 ACQUIRED HYPOTHYROIDISM: ICD-10-CM

## 2021-03-29 DIAGNOSIS — I25.10 CORONARY ARTERY DISEASE INVOLVING NATIVE HEART WITHOUT ANGINA PECTORIS, UNSPECIFIED VESSEL OR LESION TYPE: ICD-10-CM

## 2021-03-29 PROCEDURE — 99214 OFFICE O/P EST MOD 30 MIN: CPT | Performed by: INTERNAL MEDICINE

## 2021-03-29 RX ORDER — OLMESARTAN MEDOXOMIL 20 MG/1
20 TABLET ORAL DAILY
Qty: 90 TABLET | Refills: 1 | Status: SHIPPED | OUTPATIENT
Start: 2021-03-29 | End: 2022-03-28

## 2021-03-29 NOTE — PROGRESS NOTES
Chief Complaint   Patient presents with   • Hypertension   • Hyperlipidemia       History of Present Illness   Dee Jorge is a 76 y.o. female presents for follow up evaluation. She has hypertension and hyperlipidemia. She is doing feeling fairly well today. concerned about family member w/ cancer.  Thyroid levels are euthyroid and she feels that her energy is good. Has dyslipidemia. Levels are stable at this time. She is statin and zetia intolerant.         The following portions of the patient's history were reviewed and updated as appropriate: allergies, current medications, past family history, past medical history, past social history, past surgical history and problem list.  Current Outpatient Medications on File Prior to Visit   Medication Sig Dispense Refill   • carvedilol (COREG) 12.5 MG tablet Take 1.5 tablets by mouth 2 (Two) Times a Day With Meals. 210 tablet 3   • escitalopram (Lexapro) 5 MG tablet Take 1 tablet by mouth Daily. 90 tablet 1   • fluticasone (FLONASE) 50 MCG/ACT nasal spray SPRAY TWO SPRAYS IN EACH NOSTRIL ONCE DAILY 1 bottle 5   • levalbuterol (XOPENEX HFA) 45 MCG/ACT inhaler Inhale 1-2 puffs Every 4 (Four) Hours As Needed for Wheezing. 1 inhaler 11   • levalbuterol (XOPENEX) 0.63 MG/3ML nebulizer solution Take 1 ampule by nebulization Every 6 (Six) Hours As Needed for Wheezing.     • levothyroxine (SYNTHROID, LEVOTHROID) 88 MCG tablet TAKE ONE TABLET BY MOUTH DAILY 90 tablet 0   • vitamin B-12 (CYANOCOBALAMIN) 1000 MCG tablet Take 1 tablet by mouth Daily. 90 tablet 4     No current facility-administered medications on file prior to visit.     Review of Systems   Constitutional: Negative.    HENT: Negative.    Eyes: Negative.    Respiratory: Negative.    Cardiovascular: Negative.    Gastrointestinal: Negative.    Endocrine: Negative.    Genitourinary: Negative.    Musculoskeletal: Negative.    Skin: Negative.    Allergic/Immunologic: Negative.    Neurological: Negative.   "  Hematological: Negative.    Psychiatric/Behavioral: Negative.        Objective   Physical Exam  Vitals and nursing note reviewed.   Constitutional:       Appearance: Normal appearance.   HENT:      Head: Normocephalic and atraumatic.      Right Ear: Tympanic membrane normal.      Left Ear: Tympanic membrane normal.      Nose: Nose normal.      Mouth/Throat:      Mouth: Mucous membranes are moist.   Eyes:      Extraocular Movements: Extraocular movements intact.      Pupils: Pupils are equal, round, and reactive to light.   Cardiovascular:      Rate and Rhythm: Normal rate and regular rhythm.      Pulses: Normal pulses.      Heart sounds: Normal heart sounds.      Comments: + murmur  Pulmonary:      Effort: Pulmonary effort is normal.      Breath sounds: Normal breath sounds.   Abdominal:      General: Abdomen is flat.      Palpations: Abdomen is soft.   Musculoskeletal:         General: Normal range of motion.      Cervical back: Normal range of motion.   Skin:     General: Skin is warm and dry.   Neurological:      General: No focal deficit present.      Mental Status: She is alert and oriented to person, place, and time.   Psychiatric:         Mood and Affect: Mood normal.         Behavior: Behavior normal.         Thought Content: Thought content normal.         Judgment: Judgment normal.          /80   Pulse 67   Ht 157.5 cm (62\")   Wt 58.5 kg (129 lb)   LMP  (LMP Unknown)   BMI 23.59 kg/m²     Assessment/Plan   Diagnoses and all orders for this visit:    Essential hypertension    Mixed hyperlipidemia    Acquired hypothyroidism    Coronary artery disease involving native heart without angina pectoris, unspecified vessel or lesion type    Other orders  -     olmesartan (Benicar) 20 MG tablet; Take 1 tablet by mouth Daily.      Patient with hypertension. Will start benicar and continue carvedilol. She has some anxiety and will continue lexapro at 5 mg daily. She will continue synthroid daily. She has " dyslipiemeia. Statin intolerant. zetia intolerant. Injectables cost prohibitive. She will continue healthy physical activity w/ routine fitness. She will follow up routinely.

## 2021-04-19 RX ORDER — LEVALBUTEROL TARTRATE 45 UG/1
1-2 AEROSOL, METERED ORAL EVERY 4 HOURS PRN
Qty: 1 EACH | Refills: 5 | Status: SHIPPED | OUTPATIENT
Start: 2021-04-19

## 2021-04-19 NOTE — TELEPHONE ENCOUNTER
Caller: Dee Jorge    Relationship: Self    Best call back number: [phone]     Medication needed:   Requested Prescriptions     Pending Prescriptions Disp Refills   • levalbuterol (Xopenex HFA) 45 MCG/ACT inhaler       Sig: Inhale 1-2 puffs Every 4 (Four) Hours As Needed for Wheezing.       When do you need the refill by: 04/19/21    Does the patient have less than a 3 day supply:  [x] Yes  [] No    What is the patient's preferred pharmacy: [facility] [address] - [phone] [facility] [phone] FX

## 2021-06-07 RX ORDER — LEVOTHYROXINE SODIUM 88 UG/1
TABLET ORAL
Qty: 90 TABLET | Refills: 0 | Status: SHIPPED | OUTPATIENT
Start: 2021-06-07 | End: 2021-08-29

## 2021-08-09 ENCOUNTER — DOCUMENTATION (OUTPATIENT)
Dept: INTERNAL MEDICINE | Facility: CLINIC | Age: 76
End: 2021-08-09

## 2021-08-09 ENCOUNTER — TELEPHONE (OUTPATIENT)
Dept: INTERNAL MEDICINE | Facility: CLINIC | Age: 76
End: 2021-08-09

## 2021-08-09 RX ORDER — SPIRONOLACTONE 25 MG/1
25 TABLET ORAL DAILY
Qty: 90 TABLET | Refills: 1 | Status: SHIPPED | OUTPATIENT
Start: 2021-08-09 | End: 2022-03-28

## 2021-08-09 NOTE — PROGRESS NOTES
Patient with hypertension. She is on benicar with carvedilol. She reports that bp has been normotensive at home.   Will decrease benicar to 1/2 tab once daily and restart spironolactone.

## 2021-08-09 NOTE — TELEPHONE ENCOUNTER
Pt use to be on spirolactone as her BP rx  It was changed to Olmesartan   She wants to go back on the kaylynn because it helps with some facial cyst she gets. Her derm wanted to change rx but pt wants your permission first.

## 2021-08-29 RX ORDER — LEVOTHYROXINE SODIUM 88 UG/1
TABLET ORAL
Qty: 90 TABLET | Refills: 0 | Status: SHIPPED | OUTPATIENT
Start: 2021-08-29 | End: 2021-11-23

## 2021-09-16 DIAGNOSIS — E03.9 ACQUIRED HYPOTHYROIDISM: ICD-10-CM

## 2021-09-16 DIAGNOSIS — I10 ESSENTIAL HYPERTENSION: ICD-10-CM

## 2021-09-16 DIAGNOSIS — E78.2 MIXED HYPERLIPIDEMIA: Primary | ICD-10-CM

## 2021-09-22 LAB
ALBUMIN SERPL-MCNC: 4.8 G/DL (ref 3.5–5.2)
ALBUMIN/GLOB SERPL: 2.7 G/DL
ALP SERPL-CCNC: 93 U/L (ref 39–117)
ALT SERPL-CCNC: 9 U/L (ref 1–33)
AST SERPL-CCNC: 16 U/L (ref 1–32)
BASOPHILS # BLD AUTO: 0.05 10*3/MM3 (ref 0–0.2)
BASOPHILS NFR BLD AUTO: 0.6 % (ref 0–1.5)
BILIRUB SERPL-MCNC: 0.4 MG/DL (ref 0–1.2)
BUN SERPL-MCNC: 19 MG/DL (ref 8–23)
BUN/CREAT SERPL: 27.1 (ref 7–25)
CALCIUM SERPL-MCNC: 10.2 MG/DL (ref 8.6–10.5)
CHLORIDE SERPL-SCNC: 106 MMOL/L (ref 98–107)
CHOLEST SERPL-MCNC: 243 MG/DL (ref 0–200)
CO2 SERPL-SCNC: 23.5 MMOL/L (ref 22–29)
CREAT SERPL-MCNC: 0.7 MG/DL (ref 0.57–1)
EOSINOPHIL # BLD AUTO: 0.38 10*3/MM3 (ref 0–0.4)
EOSINOPHIL NFR BLD AUTO: 4.7 % (ref 0.3–6.2)
ERYTHROCYTE [DISTWIDTH] IN BLOOD BY AUTOMATED COUNT: 12.9 % (ref 12.3–15.4)
GLOBULIN SER CALC-MCNC: 1.8 GM/DL
GLUCOSE SERPL-MCNC: 87 MG/DL (ref 65–99)
HCT VFR BLD AUTO: 33 % (ref 34–46.6)
HDLC SERPL-MCNC: 52 MG/DL (ref 40–60)
HGB BLD-MCNC: 11 G/DL (ref 12–15.9)
IMM GRANULOCYTES # BLD AUTO: 0.04 10*3/MM3 (ref 0–0.05)
IMM GRANULOCYTES NFR BLD AUTO: 0.5 % (ref 0–0.5)
LDLC SERPL CALC-MCNC: 159 MG/DL (ref 0–100)
LYMPHOCYTES # BLD AUTO: 1.69 10*3/MM3 (ref 0.7–3.1)
LYMPHOCYTES NFR BLD AUTO: 20.9 % (ref 19.6–45.3)
MCH RBC QN AUTO: 29.8 PG (ref 26.6–33)
MCHC RBC AUTO-ENTMCNC: 33.3 G/DL (ref 31.5–35.7)
MCV RBC AUTO: 89.4 FL (ref 79–97)
MONOCYTES # BLD AUTO: 0.64 10*3/MM3 (ref 0.1–0.9)
MONOCYTES NFR BLD AUTO: 7.9 % (ref 5–12)
NEUTROPHILS # BLD AUTO: 5.27 10*3/MM3 (ref 1.7–7)
NEUTROPHILS NFR BLD AUTO: 65.4 % (ref 42.7–76)
NRBC BLD AUTO-RTO: 0 /100 WBC (ref 0–0.2)
PLATELET # BLD AUTO: 416 10*3/MM3 (ref 140–450)
POTASSIUM SERPL-SCNC: 4.4 MMOL/L (ref 3.5–5.2)
PROT SERPL-MCNC: 6.6 G/DL (ref 6–8.5)
RBC # BLD AUTO: 3.69 10*6/MM3 (ref 3.77–5.28)
SODIUM SERPL-SCNC: 139 MMOL/L (ref 136–145)
TRIGL SERPL-MCNC: 174 MG/DL (ref 0–150)
TSH SERPL DL<=0.005 MIU/L-ACNC: 0.93 UIU/ML (ref 0.27–4.2)
UNABLE TO VOID: NORMAL
VLDLC SERPL CALC-MCNC: 32 MG/DL (ref 5–40)
WBC # BLD AUTO: 8.07 10*3/MM3 (ref 3.4–10.8)

## 2021-09-27 ENCOUNTER — OFFICE VISIT (OUTPATIENT)
Dept: INTERNAL MEDICINE | Facility: CLINIC | Age: 76
End: 2021-09-27

## 2021-09-27 VITALS
HEART RATE: 65 BPM | SYSTOLIC BLOOD PRESSURE: 136 MMHG | HEIGHT: 62 IN | BODY MASS INDEX: 23 KG/M2 | DIASTOLIC BLOOD PRESSURE: 72 MMHG | WEIGHT: 125 LBS

## 2021-09-27 DIAGNOSIS — I25.10 CORONARY ARTERY DISEASE INVOLVING NATIVE HEART WITHOUT ANGINA PECTORIS, UNSPECIFIED VESSEL OR LESION TYPE: ICD-10-CM

## 2021-09-27 DIAGNOSIS — Z78.0 MENOPAUSE: ICD-10-CM

## 2021-09-27 DIAGNOSIS — Z00.00 HEALTHCARE MAINTENANCE: Primary | ICD-10-CM

## 2021-09-27 DIAGNOSIS — I10 ESSENTIAL HYPERTENSION: ICD-10-CM

## 2021-09-27 DIAGNOSIS — Z78.9 STATIN INTOLERANCE: ICD-10-CM

## 2021-09-27 DIAGNOSIS — E78.2 MIXED HYPERLIPIDEMIA: ICD-10-CM

## 2021-09-27 DIAGNOSIS — M81.0 AGE-RELATED OSTEOPOROSIS WITHOUT CURRENT PATHOLOGICAL FRACTURE: ICD-10-CM

## 2021-09-27 DIAGNOSIS — E03.9 ACQUIRED HYPOTHYROIDISM: ICD-10-CM

## 2021-09-27 DIAGNOSIS — Z12.31 ENCOUNTER FOR SCREENING MAMMOGRAM FOR BREAST CANCER: ICD-10-CM

## 2021-09-27 PROCEDURE — 90662 IIV NO PRSV INCREASED AG IM: CPT | Performed by: INTERNAL MEDICINE

## 2021-09-27 PROCEDURE — G0008 ADMIN INFLUENZA VIRUS VAC: HCPCS | Performed by: INTERNAL MEDICINE

## 2021-09-27 PROCEDURE — 99214 OFFICE O/P EST MOD 30 MIN: CPT | Performed by: INTERNAL MEDICINE

## 2021-09-27 PROCEDURE — G0439 PPPS, SUBSEQ VISIT: HCPCS | Performed by: INTERNAL MEDICINE

## 2021-09-27 RX ORDER — LANOLIN ALCOHOL/MO/W.PET/CERES
1000 CREAM (GRAM) TOPICAL DAILY
Qty: 90 TABLET | Refills: 4 | Status: SHIPPED | OUTPATIENT
Start: 2021-09-27 | End: 2022-10-11 | Stop reason: SDUPTHER

## 2021-09-27 RX ORDER — EVOLOCUMAB 140 MG/ML
140 INJECTION, SOLUTION SUBCUTANEOUS
Qty: 2 ML | Refills: 6 | Status: SHIPPED | OUTPATIENT
Start: 2021-09-27 | End: 2022-01-13 | Stop reason: ALTCHOICE

## 2021-09-27 RX ORDER — DOXYCYCLINE HYCLATE 50 MG/1
324 CAPSULE, GELATIN COATED ORAL
Qty: 30 TABLET | Refills: 4 | Status: SHIPPED | OUTPATIENT
Start: 2021-09-27 | End: 2022-03-28

## 2021-09-27 NOTE — PROGRESS NOTES
"Subjective   AWV  Hypertension  Hyperlipidemia  Hypothyroidism  Flatulence    Dee Jorge is a 76 y.o. female who presents for an annual wellness visit as well as check up of chronic issues w/ acute needs. Patient is doing well today.     History of Present Illness   Patient with hypertension. Normotensive blood pressure at this time. She has hypothyroidism and has been euthyroid w/ supplementation. Energy is \"average\". She reports that she is under a fair amount of stress regarding health of spouse w/ blood disorder. She is clinically euthyroid w/ normal tsh.   Has mild anemia. Noted to have low iron and low normal b12 level in the past.   Lipids are elevated. She has an elevated cardiac calcium score/ known cad. She is statin intolerant and also can not take fibrates or zetia.   C/o flatulence. No recent dietary changes.         Compared to one year ago, the patient feels his physical health is the same.  Compared to one year ago, the patient feels his mental health is the same.    Reviewed chart for potential of high risk medication in the elderly: yes  Reviewed chart for potential of harmful drug interactions in the elderly:yes    Discussed the patient's BMI with him. The BMI is in the acceptable range.    Current medical providers:  Patient Care Team:  Kacie Loving MD as PCP - General  Kacie Loving MD as PCP - Family Medicine  Jasmin Mehta MD as Consulting Physician (Cardiology)    Review of Systems   Constitutional: Negative.    HENT: Negative.    Eyes: Negative.    Respiratory: Negative.    Cardiovascular: Negative.    Gastrointestinal:        Flatulence     Endocrine: Negative.    Genitourinary: Negative.    Musculoskeletal: Negative.    Skin: Negative.    Allergic/Immunologic: Negative.    Neurological: Negative.    Hematological: Negative.    Psychiatric/Behavioral: Negative.        The following portions of the patient's history were reviewed and updated as appropriate: allergies, current " medications, past family history, past medical history, past social history, past surgical history and problem list.  Health maintenance tab was reviewed and updated with the patient.       Patient Active Problem List    Diagnosis Date Noted   • CAD (coronary artery disease) 10/05/2020   • Occult blood positive stool 11/07/2019     Note Last Updated: 11/7/2019     Added automatically from request for surgery [id number]     • High coronary artery calcium score 11/15/2018   • Hearing loss 09/12/2018   • Age-related osteoporosis without current pathological fracture 08/20/2018   • Foot trauma 02/13/2017   • Leg edema, left 04/18/2016   • Anxiety 02/15/2016   • Mixed hyperlipidemia 02/15/2016   • Essential hypertension 02/15/2016   • Hypothyroidism 02/15/2016   • Pain in shoulder 02/15/2016       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   • Bronchitis    • CAD (coronary artery disease)    • Cataract 10/05/2016    BILATERAL   • Constipation    • Cystocele    • Fatigue    • Foot trauma, left, initial encounter 02/13/2017   • Fracture of patella    • Hearing loss    • Hyperlipidemia    • Hypertension    • Hypothyroidism     ACQUIRED   • Insomnia    • Mastodynia 09/29/2015   • OA (osteoarthritis)    • Osteoporosis    • Patella fracture 2010    LEFT   • Positive occult stool blood test 10/04/2019   • Postmenopausal    • Rectocele    • Right shoulder strain 08/31/2002    D/T FALL, SEEN AT [facility] ER   • Rotator cuff tear, right 12/05/2014    [facility] DR. CHARLES BARTLETT   • Seborrheic keratosis    • Trigger finger, left middle finger 02/2016       Past Surgical History:   Procedure Laterality Date   • ANTERIOR AND POSTERIOR VAGINAL REPAIR N/A 09/21/2011    RECTOCELE AND CYSTOCELE REPAIR, PLACEMENT OF PROLIFT GRAFT,  DR. LISA PALM AT [facility]   • BUNIONECTOMY Left 08/02/2010    LEFT MEDIAL EXTRA-ARTICULAR GANGLION CYST REMOVAL AND FIRST MTP CHILECTOMY WITH SYNOVECTOMY, DR. BHAVANI BABCOCK AT [facility]   • CHOLECYSTECTOMY N/A 1992    DR. QUINCY CEE   • COLONOSCOPY N/A 09/18/2015    ENTIRE COLON WNL, RESCOPE IN 10 YRS, DR. QUINCY VILLARREAL AT [place]   • COLONOSCOPY N/A 07/20/2005    WNL, DR. MAY VAZQUEZ AT [facility]   • COLONOSCOPY N/A 1/22/2020    ENTIRE COLON WNL, RESCOPE IN 10 YRS, DR. CHINO MONROE AT [facility]   • HERNIA REPAIR Bilateral 1972    DR. AIDEN ARZATE   • HYSTERECTOMY N/A 1984    DR. ZEKE BLANDON   • KNEE ARTHROSCOPY Right    • KNEE CARTILAGE SURGERY Right 08/21/2012    RIGHT CHONDROPLASTY OF PATELLA AND TROCHLEA, CHONDROPLASTY MEDIAL FEMORAL CONDYLE, CHONDROPLASTY LATERAL TIBIAL PLATEAU AND LATERAL FEMORAL CONDYLE, SUBTOTAL LATERAL MENISCECTOMY, OSTEOPLASTY ANTERIOR TIBIAL INTERCONDYLAR NOTCH, DR. BHAVANI BABCOCK AT [facility]   • NASAL ENDOSCOPY N/A 03/11/2015    ANTERIOR RHINOSCOPY, CONGESTION OF NASAL MUCOSA, DR. BRENDAN CHAMPION   • SALPINGO OOPHORECTOMY Right 07/30/2008    FOR ADNEXAL MASS, DR. LISA PALM AT [facility]   • TOTAL HIP ARTHROPLASTY Left 05/06/2013    DR.REID PALM AT [facility]   • TOTAL HIP ARTHROPLASTY Right 10/13/2008    DR. KELVIN TARIQ AT [place]   • TOTAL KNEE ARTHROPLASTY Left 01/20/2014    DR. GORDO PALM AT [facility]       Family History   Problem Relation Age of Onset   • Hypertension Mother    • Thyroid disease Mother    • Stroke Mother    • Asthma Mother    • Asthma Father    • Emphysema Father    • Alcohol abuse Father    • Hypertension Father    • COPD Father    • Thyroid disease Daughter    • Diabetes type I Daughter    • Diabetes Daughter    • Stroke Maternal Grandfather         ischemic   • Kidney disease Sister    • Hypertension Sister    • Twan's disease Grandchild    • Asthma Grandchild    • Breast cancer Neg Hx        Social History     Socioeconomic History   • Marital status:      Spouse name: Not  on file   • Number of children: Not on file   • Years of education: Not on file   • Highest education level: Not on file   Tobacco Use   • Smoking status: Never Smoker   • Smokeless tobacco: Never Used   Vaping Use   • Vaping Use: Never used   Substance and Sexual Activity   • Alcohol use: No     Comment: daily caffiene   • Drug use: No   • Sexual activity: Defer     Birth control/protection: Post-menopausal, Surgical       Current Outpatient Medications on File Prior to Visit   Medication Sig Dispense Refill   • carvedilol (COREG) 12.5 MG tablet Take 1.5 tablets by mouth 2 (Two) Times a Day With Meals. 210 tablet 3   • escitalopram (Lexapro) 5 MG tablet Take 1 tablet by mouth Daily. 90 tablet 1   • fluticasone (FLONASE) 50 MCG/ACT nasal spray SPRAY TWO SPRAYS IN EACH NOSTRIL ONCE DAILY 1 bottle 5   • levalbuterol (Xopenex HFA) 45 MCG/ACT inhaler Inhale 1-2 puffs Every 4 (Four) Hours As Needed for Wheezing. 1 each 5   • levothyroxine (SYNTHROID, LEVOTHROID) 88 MCG tablet TAKE ONE TABLET BY MOUTH DAILY 90 tablet 0   • olmesartan (Benicar) 20 MG tablet Take 1 tablet by mouth Daily. 90 tablet 1   • spironolactone (Aldactone) 25 MG tablet Take 1 tablet by mouth Daily. 90 tablet 1   • [DISCONTINUED] vitamin B-12 (CYANOCOBALAMIN) 1000 MCG tablet Take 1 tablet by mouth Daily. 90 tablet 4   • levalbuterol (XOPENEX) 0.63 MG/3ML nebulizer solution Take 1 ampule by nebulization Every 6 (Six) Hours As Needed for Wheezing.       No current facility-administered medications on file prior to visit.       Allergies   Allergen Reactions   • Statins Myalgia   • Codeine Nausea And Vomiting   • Penicillins Hives   • Sulfa Antibiotics Nausea And Vomiting and Other (See Comments)     HEADACHES       Immunization History   Administered Date(s) Administered   • COVID-19 (PFIZER) 02/10/2021, 03/03/2021   • FLUAD TRI 65YR+ 09/16/2019   • FluMist 2-49yrs 08/31/2015   • Fluad Quad 65+ 09/21/2020   • Fluzone High Dose =>65 Years ([employer] ONLY)  "11/27/2018   • Fluzone High-Dose 65+yrs 09/27/2021   • Influenza, Unspecified 09/16/2019   • Pneumococcal Conjugate 13-Valent (PCV13) 08/08/2016   • Pneumococcal Polysaccharide (PPSV23) 01/01/2009, 11/27/2018   • Td 07/15/2014   • Zostavax 01/01/2008       Objective     /72   Pulse 65   Ht 157.5 cm (62\")   Wt 56.7 kg (125 lb)   LMP  (LMP Unknown)   BMI 22.86 kg/m²     Physical Exam  Vitals and nursing note reviewed.   Constitutional:       Appearance: Normal appearance.   HENT:      Head: Normocephalic and atraumatic.      Right Ear: Tympanic membrane normal.      Left Ear: Tympanic membrane normal.      Nose: Nose normal.      Mouth/Throat:      Mouth: Mucous membranes are moist.   Eyes:      Extraocular Movements: Extraocular movements intact.      Pupils: Pupils are equal, round, and reactive to light.   Cardiovascular:      Rate and Rhythm: Normal rate and regular rhythm.      Pulses: Normal pulses.      Heart sounds: Normal heart sounds.   Pulmonary:      Effort: Pulmonary effort is normal.      Breath sounds: Normal breath sounds.   Abdominal:      General: Abdomen is flat. Bowel sounds are normal.      Palpations: Abdomen is soft.   Musculoskeletal:         General: Normal range of motion.      Cervical back: Normal range of motion.   Skin:     General: Skin is warm and dry.   Neurological:      General: No focal deficit present.      Mental Status: She is alert and oriented to person, place, and time.   Psychiatric:         Mood and Affect: Mood normal.         Behavior: Behavior normal.         Thought Content: Thought content normal.         Judgment: Judgment normal.         Assessment/Plan   Diagnoses and all orders for this visit:    1. Healthcare maintenance (Primary)    2. Essential hypertension    3. Coronary artery disease involving native heart without angina pectoris, unspecified vessel or lesion type  -     Evolocumab (Repatha SureClick) solution auto-injector SureClick injection; " Inject 1 mL under the skin into the appropriate area as directed Every 14 (Fourteen) Days.  Dispense: 2 mL; Refill: 6    4. Acquired hypothyroidism    5. Mixed hyperlipidemia    6. Age-related osteoporosis without current pathological fracture    7. Encounter for screening mammogram for breast cancer  -     Mammo screening digital tomosynthesis bilateral w CAD    8. Menopause  -     DEXA Bone Density Axial    9. Statin intolerance  -     Evolocumab (Repatha SureClick) solution auto-injector SureClick injection; Inject 1 mL under the skin into the appropriate area as directed Every 14 (Fourteen) Days.  Dispense: 2 mL; Refill: 6    Other orders  -     vitamin B-12 (CYANOCOBALAMIN) 1000 MCG tablet; Take 1 tablet by mouth Daily.  Dispense: 90 tablet; Refill: 4  -     ferrous gluconate (FERGON) 324 MG tablet; Take 1 tablet by mouth Daily With Breakfast.  Dispense: 30 tablet; Refill: 4  -     Fluzone High-Dose 65+yrs ([id number])        Discussion    AWV.      Medicare Risks and Personalized Health Plan  CMS Preventative Services Quick Reference  Abdominal Aortic Aneurysm Screening  Breast Cancer/Mammogram Screening  Fall Risk  Glaucoma Risk  Immunizations Discussed/Encouraged (specific immunizations; Influenza and COVID19 )#3    ACP discussion was held with the patient during this visit. Patient has an advance directive (not in EMR), copy requested..    Direct observation of cognitive abilities:  normal.    Hyperlipidemia/ cad - will get vascular screening. Statin intolerant. Could not take zetia. Will rx repatha and see if can get insurance approval given elevated cardiac calcium scoring.   htn- she should monitor bp and continue current meds  Anemia- to start iron/ b12 replacement. Will monitor h/h.   Hypothyroid- continue current dosing and monitor thyroid function  Patient is due for mammogram and dexa. She is to schedule these. She will f/u in 6 months or prn. To keep a food diary regarding flatulence and moderate  fiber consumption.         Depression Screen:    PHQ-2/PHQ-9 Depression Screening 9/27/2021   Little interest or pleasure in doing things 0   Feeling down, depressed, or hopeless 0   Total Score 0       Fall Risk Screen:  DINO Fall Risk Assessment was completed, and patient is at LOW risk for falls.Assessment completed on:9/27/2021    Health Habits and Functional/Cognitive screen:  Functional & Cognitive Status 9/27/2021   Do you have difficulty preparing food and eating? No   Do you have difficulty bathing yourself, getting dressed or grooming yourself? No   Do you have difficulty using the toilet? No   Do you have difficulty moving around from place to place? No   Do you have trouble with steps or getting out of a bed or a chair? No   Current Diet Well Balanced Diet   Dental Exam Up to date   Eye Exam Up to date   Exercise (times per week) -   Current Exercise Activities Include -   Do you need help using the phone?  No   Are you deaf or do you have serious difficulty hearing?  No   Do you need help with transportation? No   Do you need help shopping? No   Do you need help preparing meals?  No   Do you need help with housework?  No   Do you need help with laundry? No   Do you need help taking your medications? No   Do you need help managing money? No   Do you ever drive or ride in a car without wearing a seat belt? No   Have you felt unusual stress, anger or loneliness in the last month? No   Who do you live with? Spouse   If you need help, do you have trouble finding someone available to you? No   Have you been bothered in the last four weeks by sexual problems? No   Do you have difficulty concentrating, remembering or making decisions? No       Health Maintenance   Topic Date Due   • ZOSTER VACCINE (2 of 3) 02/26/2008   • DXA SCAN  08/21/2019   • MAMMOGRAM  09/09/2021   • INFLUENZA VACCINE  10/01/2021   • LIPID PANEL  09/22/2022   • ANNUAL WELLNESS VISIT  09/27/2022   • TDAP/TD VACCINES (2 - Tdap) 07/15/2024    • COLORECTAL CANCER SCREENING  01/22/2030   • HEPATITIS C SCREENING  Completed   • COVID-19 Vaccine  Completed   • Pneumococcal Vaccine 65+  Completed            Future Appointments   Date Time Provider Department Center   1/13/2022 10:00 AM Jasmin Mehta MD [facility]   3/21/2022  9:40 AM [facility]   3/28/2022  8:30 AM Kacie Loving MD [facility]

## 2021-11-06 ENCOUNTER — IMMUNIZATION (OUTPATIENT)
Dept: VACCINE CLINIC | Facility: HOSPITAL | Age: 76
End: 2021-11-06

## 2021-11-06 PROCEDURE — 91300 HC SARSCOV02 VAC 30MCG/0.3ML IM: CPT | Performed by: INTERNAL MEDICINE

## 2021-11-06 PROCEDURE — 0004A HC ADM SARSCOV2 30MCG/0.3ML BOOSTER: CPT | Performed by: INTERNAL MEDICINE

## 2021-11-06 PROCEDURE — 0003A: CPT | Performed by: INTERNAL MEDICINE

## 2021-11-08 DIAGNOSIS — Z12.31 BREAST CANCER SCREENING BY MAMMOGRAM: Primary | ICD-10-CM

## 2021-11-08 DIAGNOSIS — Z78.0 MENOPAUSE: Primary | ICD-10-CM

## 2021-11-23 RX ORDER — LEVOTHYROXINE SODIUM 88 UG/1
TABLET ORAL
Qty: 90 TABLET | Refills: 0 | Status: SHIPPED | OUTPATIENT
Start: 2021-11-23 | End: 2022-02-23

## 2021-12-22 ENCOUNTER — HOSPITAL ENCOUNTER (OUTPATIENT)
Dept: MAMMOGRAPHY | Facility: HOSPITAL | Age: 76
Discharge: HOME | End: 2021-12-22

## 2021-12-22 DIAGNOSIS — Z12.31 BREAST CANCER SCREENING BY MAMMOGRAM: ICD-10-CM

## 2022-01-13 ENCOUNTER — OFFICE VISIT (OUTPATIENT)
Dept: CARDIOLOGY | Facility: CLINIC | Age: 77
End: 2022-01-13

## 2022-01-13 VITALS
BODY MASS INDEX: 23 KG/M2 | HEART RATE: 68 BPM | DIASTOLIC BLOOD PRESSURE: 76 MMHG | RESPIRATION RATE: 16 BRPM | OXYGEN SATURATION: 96 % | HEIGHT: 62 IN | WEIGHT: 125 LBS | SYSTOLIC BLOOD PRESSURE: 146 MMHG

## 2022-01-13 DIAGNOSIS — I25.10 CORONARY ARTERY DISEASE INVOLVING NATIVE HEART WITHOUT ANGINA PECTORIS, UNSPECIFIED VESSEL OR LESION TYPE: Primary | ICD-10-CM

## 2022-01-13 DIAGNOSIS — I10 ESSENTIAL HYPERTENSION: ICD-10-CM

## 2022-01-13 DIAGNOSIS — E78.2 MIXED HYPERLIPIDEMIA: ICD-10-CM

## 2022-01-13 PROCEDURE — 99214 OFFICE O/P EST MOD 30 MIN: CPT | Performed by: INTERNAL MEDICINE

## 2022-01-13 PROCEDURE — 93000 ELECTROCARDIOGRAM COMPLETE: CPT | Performed by: INTERNAL MEDICINE

## 2022-01-13 RX ORDER — FLUOXETINE 10 MG/1
10 CAPSULE ORAL DAILY
Qty: 30 CAPSULE | Refills: 2 | Status: SHIPPED | OUTPATIENT
Start: 2022-01-13 | End: 2022-02-04

## 2022-01-13 NOTE — PROGRESS NOTES
"      CARDIOLOGY    Jasmin Mehta MD    ENCOUNTER DATE:  01/13/2022    Dee Jorge / 76 y.o. / female        CHIEF COMPLAINT / REASON FOR OFFICE VISIT     Coronary Artery Disease (1 year follow up)      HISTORY OF PRESENT ILLNESS       HPI    Dee Jorge is a 76 y.o. female     This is a woman whom I saw in the remote past and then came back to see me 9/27/18. She has a history of hypertension and hyperlipidemia but is statin intolerant. She says she has tried about 5 different statins and cannot tolerate them. She had an echocardiogram in 08/2018 which showed mild asymmetric septal hypertrophy, normal LV systolic function with an ejection fraction of 59%, grade I diastolic dysfunction, calcified aortic valve with mild-to-moderate aortic regurgitation. She had a calcium score of 404 on 9/12/18.     REVIEW OF SYSTEMS     Review of Systems   Constitutional: Negative for chills, fever, weight gain and weight loss.   Cardiovascular: Negative for leg swelling.   Respiratory: Negative for cough, snoring and wheezing.    Hematologic/Lymphatic: Negative for bleeding problem. Does not bruise/bleed easily.   Skin: Negative for color change.   Musculoskeletal: Negative for falls, joint pain and myalgias.   Gastrointestinal: Negative for melena.   Genitourinary: Negative for hematuria.   Neurological: Negative for excessive daytime sleepiness.   Psychiatric/Behavioral: Negative for depression. The patient is nervous/anxious.          VITAL SIGNS     Visit Vitals  /76 (BP Location: Right arm, Patient Position: Sitting, Cuff Size: Adult)   Pulse 68   Resp 16   Ht 157.5 cm (62\")   Wt 56.7 kg (125 lb)   LMP  (LMP Unknown)   SpO2 96%   BMI 22.86 kg/m²         Wt Readings from Last 3 Encounters:   01/13/22 56.7 kg (125 lb)   09/27/21 56.7 kg (125 lb)   03/29/21 58.5 kg (129 lb)     Body mass index is 22.86 kg/m².      PHYSICAL EXAMINATION     Constitutional:       General: Not in acute distress.  Neck:      Vascular: No " carotid bruit or JVD.   Pulmonary:      Effort: Pulmonary effort is normal.      Breath sounds: Normal breath sounds.   Cardiovascular:      Normal rate. Regular rhythm.      Murmurs: There is a grade 3/6 systolic murmur at the URSB.   Psychiatric:         Mood and Affect: Mood and affect normal.           REVIEWED DATA       ECG 12 Lead    Date/Time: 1/13/2022 10:43 AM  Performed by: Jasmin Mehta MD  Authorized by: Jasmin Mehta MD   Comparison: compared with previous ECG from 1/7/2021  Similar to previous ECG  Rhythm: sinus rhythm  BPM: 68  Conduction: conduction normal  ST Segments: ST segments normal  T Waves: T waves normal    Clinical impression: normal ECG              Lipid Panel    Lipid Panel 3/22/21 9/22/21   Total Cholesterol 230 (A) 243 (A)   Triglycerides 94 174 (A)   HDL Cholesterol 61 (A) 52   VLDL Cholesterol 17 32   LDL Cholesterol  152 (A) 159 (A)   (A) Abnormal value       Comments are available for some flowsheets but are not being displayed.             Lab Results   Component Value Date    GLUCOSE 87 09/22/2021    BUN 19 09/22/2021    CREATININE 0.70 09/22/2021    EGFRIFNONA 81 09/22/2021    EGFRIFAFRI 98 09/22/2021    BCR 27.1 (H) 09/22/2021    K 4.4 09/22/2021    CO2 23.5 09/22/2021    CALCIUM 10.2 09/22/2021    PROTENTOTREF 6.6 09/22/2021    ALBUMIN 4.80 09/22/2021    LABIL2 2.7 09/22/2021    AST 16 09/22/2021    ALT 9 09/22/2021       ASSESSMENT & PLAN      Diagnosis Plan   1. Coronary artery disease involving native heart without angina pectoris, unspecified vessel or lesion type     2. Mixed hyperlipidemia     3. Essential hypertension         1.  Coronary artery disease.  No anginal symptoms.  She denies chest pain or shortness of breath.  Continue current medications.  She does not tolerate statins.  Zetia caused diarrhea.  2.  Hypertension.  Controlled  3.  Hyperlipidemia.  As above.  Not tolerated multiple medications.  4.  Mild to moderate aortic regurgitation.  She does  have a murmur at the upper right sternal border.  I recommend repeating an echo probably next year.  I am can avoid doing it right now since she is not having any major changes in her symptoms and were in the middle of another COVID surge and her  has leukemia so she needs to be extra careful about exposure.      Orders Placed This Encounter   Procedures   • ECG 12 Lead     This order was created via procedure documentation     Order Specific Question:   Release to patient     Answer:   Immediate           MEDICATIONS         Discharge Medications          Accurate as of January 13, 2022 10:43 AM. If you have any questions, ask your nurse or doctor.            Continue These Medications      Instructions Start Date   carvedilol 12.5 MG tablet  Commonly known as: COREG   18.75 mg, Oral, 2 Times Daily With Meals      ferrous gluconate 324 MG tablet  Commonly known as: FERGON   324 mg, Oral, Daily With Breakfast      fluticasone 50 MCG/ACT nasal spray  Commonly known as: FLONASE   SPRAY TWO SPRAYS IN EACH NOSTRIL ONCE DAILY      levalbuterol 0.63 MG/3ML nebulizer solution  Commonly known as: XOPENEX   1 ampule, Nebulization, Every 6 Hours PRN      levalbuterol 45 MCG/ACT inhaler  Commonly known as: Xopenex HFA   1-2 puffs, Inhalation, Every 4 Hours PRN      levothyroxine 88 MCG tablet  Commonly known as: SYNTHROID, LEVOTHROID   TAKE ONE TABLET BY MOUTH DAILY      olmesartan 20 MG tablet  Commonly known as: Benicar   20 mg, Oral, Daily      spironolactone 25 MG tablet  Commonly known as: Aldactone   25 mg, Oral, Daily      vitamin B-12 1000 MCG tablet  Commonly known as: CYANOCOBALAMIN   1,000 mcg, Oral, Daily               Jasmin Mehta MD  01/13/22  10:43 EST    **Moe Disclaimer:   Much of this encounter note is an electronic transcription/translation of spoken language to printed text. The electronic translation of spoken language may permit erroneous, or at times, nonsensical words or phrases to be  inadvertently transcribed. Although I have reviewed the note for such errors, some may still exist.

## 2022-01-14 ENCOUNTER — TELEPHONE (OUTPATIENT)
Dept: INTERNAL MEDICINE | Facility: CLINIC | Age: 77
End: 2022-01-14

## 2022-01-14 NOTE — TELEPHONE ENCOUNTER
Pt informed and understands        ----- Message from Kacie Loving MD sent at 1/13/2022  4:38 PM EST -----  Patient's cardiologist reported patient needed something different for depression. Advise patient to STOP lexapro. Once headach has resolved she can try fluoxetine 10 mg one capsule daily. She has a follow up in march and can discuss at that time. Sooner if needed.   nella

## 2022-02-04 ENCOUNTER — TELEPHONE (OUTPATIENT)
Dept: INTERNAL MEDICINE | Facility: CLINIC | Age: 77
End: 2022-02-04

## 2022-02-04 RX ORDER — ALPRAZOLAM 0.25 MG/1
0.25 TABLET ORAL 2 TIMES DAILY PRN
Qty: 15 TABLET | Refills: 1 | Status: SHIPPED | OUTPATIENT
Start: 2022-02-04 | End: 2022-03-28

## 2022-02-04 RX ORDER — FLUOXETINE HYDROCHLORIDE 20 MG/1
20 CAPSULE ORAL DAILY
Qty: 90 CAPSULE | Refills: 1 | Status: SHIPPED | OUTPATIENT
Start: 2022-02-04 | End: 2022-03-28

## 2022-02-04 NOTE — TELEPHONE ENCOUNTER
Caller: Narda Tafoya    Relationship: Emergency Contact    What medication are you requesting: MEDICATION FOR NERVES    What are your current symptoms:  PASSED AWAY LAST NIGHT FROM [place] AND IS HAVING A HARD TIME COPING    How long have you been experiencing symptoms: 1 DAY    Have you had these symptoms before: [] Yes  [x] No    Have you been treated for these symptoms before:  [] Yes  [x] No    If a prescription is needed, what is your preferred pharmacy and phone number:  [id number] [address] [facility] AT [facility] [phone] [facility] [phone]

## 2022-02-04 NOTE — TELEPHONE ENCOUNTER
Please extend my condolences. Increased fluoxetine to 20 mg daily and sent xanax 1/2 -1 tab bid to her pharmacy. May be seen in  office or by video at any time   jw

## 2022-02-23 RX ORDER — LEVOTHYROXINE SODIUM 88 UG/1
TABLET ORAL
Qty: 90 TABLET | Refills: 0 | Status: SHIPPED | OUTPATIENT
Start: 2022-02-23 | End: 2022-05-31

## 2022-03-17 DIAGNOSIS — I10 ESSENTIAL HYPERTENSION: ICD-10-CM

## 2022-03-17 DIAGNOSIS — E78.2 MIXED HYPERLIPIDEMIA: Primary | ICD-10-CM

## 2022-03-22 LAB
ALBUMIN SERPL-MCNC: 4.6 G/DL (ref 3.7–4.7)
ALBUMIN/GLOB SERPL: 2.2 {RATIO} (ref 1.2–2.2)
ALP SERPL-CCNC: 89 IU/L (ref 44–121)
ALT SERPL-CCNC: 12 IU/L (ref 0–32)
AST SERPL-CCNC: 22 IU/L (ref 0–40)
BASOPHILS # BLD AUTO: 0.1 X10E3/UL (ref 0–0.2)
BASOPHILS NFR BLD AUTO: 1 %
BILIRUB SERPL-MCNC: 0.5 MG/DL (ref 0–1.2)
BUN SERPL-MCNC: 16 MG/DL (ref 8–27)
BUN/CREAT SERPL: 28 (ref 12–28)
CALCIUM SERPL-MCNC: 9.8 MG/DL (ref 8.7–10.3)
CHLORIDE SERPL-SCNC: 104 MMOL/L (ref 96–106)
CHOLEST SERPL-MCNC: 232 MG/DL (ref 100–199)
CO2 SERPL-SCNC: 22 MMOL/L (ref 20–29)
CREAT SERPL-MCNC: 0.58 MG/DL (ref 0.57–1)
EGFRCR SERPLBLD CKD-EPI 2021: 93 ML/MIN/1.73
EOSINOPHIL # BLD AUTO: 0.3 X10E3/UL (ref 0–0.4)
EOSINOPHIL NFR BLD AUTO: 4 %
ERYTHROCYTE [DISTWIDTH] IN BLOOD BY AUTOMATED COUNT: 13.3 % (ref 11.7–15.4)
GLOBULIN SER CALC-MCNC: 2.1 G/DL (ref 1.5–4.5)
GLUCOSE SERPL-MCNC: 110 MG/DL (ref 65–99)
HCT VFR BLD AUTO: 34.3 % (ref 34–46.6)
HDLC SERPL-MCNC: 65 MG/DL
HGB BLD-MCNC: 11.7 G/DL (ref 11.1–15.9)
IMM GRANULOCYTES # BLD AUTO: 0 X10E3/UL (ref 0–0.1)
IMM GRANULOCYTES NFR BLD AUTO: 0 %
LDLC SERPL CALC-MCNC: 149 MG/DL (ref 0–99)
LYMPHOCYTES # BLD AUTO: 1.4 X10E3/UL (ref 0.7–3.1)
LYMPHOCYTES NFR BLD AUTO: 17 %
MCH RBC QN AUTO: 29.8 PG (ref 26.6–33)
MCHC RBC AUTO-ENTMCNC: 34.1 G/DL (ref 31.5–35.7)
MCV RBC AUTO: 88 FL (ref 79–97)
MONOCYTES # BLD AUTO: 0.5 X10E3/UL (ref 0.1–0.9)
MONOCYTES NFR BLD AUTO: 6 %
NEUTROPHILS # BLD AUTO: 5.9 X10E3/UL (ref 1.4–7)
NEUTROPHILS NFR BLD AUTO: 72 %
PLATELET # BLD AUTO: 374 X10E3/UL (ref 150–450)
POTASSIUM SERPL-SCNC: 4 MMOL/L (ref 3.5–5.2)
PROT SERPL-MCNC: 6.7 G/DL (ref 6–8.5)
RBC # BLD AUTO: 3.92 X10E6/UL (ref 3.77–5.28)
SODIUM SERPL-SCNC: 141 MMOL/L (ref 134–144)
TRIGL SERPL-MCNC: 103 MG/DL (ref 0–149)
VLDLC SERPL CALC-MCNC: 18 MG/DL (ref 5–40)
WBC # BLD AUTO: 8.2 X10E3/UL (ref 3.4–10.8)

## 2022-03-28 ENCOUNTER — OFFICE VISIT (OUTPATIENT)
Dept: INTERNAL MEDICINE | Facility: CLINIC | Age: 77
End: 2022-03-28

## 2022-03-28 VITALS
SYSTOLIC BLOOD PRESSURE: 168 MMHG | HEIGHT: 62 IN | BODY MASS INDEX: 22.63 KG/M2 | WEIGHT: 123 LBS | HEART RATE: 71 BPM | DIASTOLIC BLOOD PRESSURE: 84 MMHG

## 2022-03-28 DIAGNOSIS — I10 ESSENTIAL HYPERTENSION: ICD-10-CM

## 2022-03-28 DIAGNOSIS — E78.2 MIXED HYPERLIPIDEMIA: ICD-10-CM

## 2022-03-28 DIAGNOSIS — I35.1 AORTIC VALVE INSUFFICIENCY, ETIOLOGY OF CARDIAC VALVE DISEASE UNSPECIFIED: Primary | ICD-10-CM

## 2022-03-28 DIAGNOSIS — R42 DIZZINESS: ICD-10-CM

## 2022-03-28 PROCEDURE — 99214 OFFICE O/P EST MOD 30 MIN: CPT | Performed by: INTERNAL MEDICINE

## 2022-03-28 RX ORDER — OLMESARTAN MEDOXOMIL 20 MG/1
20 TABLET ORAL DAILY
Qty: 90 TABLET | Refills: 1 | Status: SHIPPED | OUTPATIENT
Start: 2022-03-28 | End: 2023-01-12 | Stop reason: ALTCHOICE

## 2022-03-28 NOTE — PROGRESS NOTES
Chief Complaint   Patient presents with   • Hyperlipidemia   • Hypertension   • grief       History of Present Illness   Dee Jorge is a 77 y.o. female presents for follow up evaluation. She reports that she is doing well today. She has hypertension. She was rx olmesartan and spironolactone for this. She has self discontinued these medications. She reports that bp at home runs 120s/ 70s at home in general. Occasional 140.  Continues to take carvediolol twice daily.   Lost her spouse last month. Complications of bone marrow transplant/ bowel       The following portions of the patient's history were reviewed and updated as appropriate: allergies, current medications, past family history, past medical history, past social history, past surgical history and problem list.  Current Outpatient Medications on File Prior to Visit   Medication Sig Dispense Refill   • carvedilol (COREG) 12.5 MG tablet Take 1.5 tablets by mouth 2 (Two) Times a Day With Meals. 210 tablet 3   • fluticasone (FLONASE) 50 MCG/ACT nasal spray SPRAY TWO SPRAYS IN EACH NOSTRIL ONCE DAILY 1 bottle 5   • levalbuterol (Xopenex HFA) 45 MCG/ACT inhaler Inhale 1-2 puffs Every 4 (Four) Hours As Needed for Wheezing. 1 each 5   • levalbuterol (XOPENEX) 0.63 MG/3ML nebulizer solution Take 1 ampule by nebulization Every 6 (Six) Hours As Needed for Wheezing.     • levothyroxine (SYNTHROID, LEVOTHROID) 88 MCG tablet TAKE ONE TABLET BY MOUTH DAILY 90 tablet 0   • vitamin B-12 (CYANOCOBALAMIN) 1000 MCG tablet Take 1 tablet by mouth Daily. 90 tablet 4   • [DISCONTINUED] ALPRAZolam (Xanax) 0.25 MG tablet Take 1 tablet by mouth 2 (Two) Times a Day As Needed for Anxiety. 15 tablet 1   • [DISCONTINUED] ferrous gluconate (FERGON) 324 MG tablet Take 1 tablet by mouth Daily With Breakfast. 30 tablet 4   • [DISCONTINUED] FLUoxetine (PROzac) 20 MG capsule Take 1 capsule by mouth Daily. 90 capsule 1   • [DISCONTINUED] olmesartan (Benicar) 20 MG tablet Take 1 tablet by mouth  Daily. 90 tablet 1   • [DISCONTINUED] spironolactone (Aldactone) 25 MG tablet Take 1 tablet by mouth Daily. 90 tablet 1     No current facility-administered medications on file prior to visit.     Review of Systems   Constitutional: Negative.    HENT: Negative.    Eyes: Negative.    Respiratory: Negative.    Cardiovascular: Positive for palpitations. Negative for chest pain.        Occasional dizziness   Gastrointestinal: Negative.    Endocrine: Negative.    Genitourinary: Negative.    Musculoskeletal: Negative.    Allergic/Immunologic: Negative.    Neurological: Negative.    Hematological: Negative.    Psychiatric/Behavioral: Negative.        Objective   Physical Exam  Vitals and nursing note reviewed.   Constitutional:       Appearance: Normal appearance. She is well-developed.   HENT:      Head: Normocephalic and atraumatic.      Right Ear: Hearing, tympanic membrane and external ear normal.      Left Ear: Hearing, tympanic membrane and external ear normal.      Nose: Nose normal.      Mouth/Throat:      Mouth: Mucous membranes are moist.   Eyes:      Extraocular Movements: Extraocular movements intact.      Pupils: Pupils are equal, round, and reactive to light.   Neck:      Thyroid: No thyromegaly.   Cardiovascular:      Rate and Rhythm: Normal rate and regular rhythm.      Heart sounds: Normal heart sounds. No murmur heard.  Pulmonary:      Effort: Pulmonary effort is normal.      Breath sounds: Normal breath sounds.   Chest:   Breasts:      Right: No mass.      Left: No mass.       Abdominal:      General: Abdomen is flat. There is no distension.      Palpations: Abdomen is soft.      Tenderness: There is no abdominal tenderness.   Musculoskeletal:      Cervical back: Neck supple.   Lymphadenopathy:      Cervical: No cervical adenopathy.   Skin:     General: Skin is warm and dry.   Neurological:      General: No focal deficit present.      Mental Status: She is alert and oriented to person, place, and time.  "  Psychiatric:         Mood and Affect: Mood normal.         Speech: Speech normal.         Behavior: Behavior normal.         Thought Content: Thought content normal.         Judgment: Judgment normal.          /84   Pulse 71   Ht 157.5 cm (62\")   Wt 55.8 kg (123 lb)   LMP  (LMP Unknown)   BMI 22.50 kg/m²     Assessment/Plan   Diagnoses and all orders for this visit:    Aortic valve insufficiency, etiology of cardiac valve disease unspecified  -     Adult Transthoracic Echo Complete W/ Cont if Necessary Per Protocol; Future    Dizziness  -     Adult Transthoracic Echo Complete W/ Cont if Necessary Per Protocol; Future    Essential hypertension    Mixed hyperlipidemia    Other orders  -     olmesartan (Benicar) 20 MG tablet; Take 1 tablet by mouth Daily.      Patient with hypertension. Continued elevation today. She will restart olmesartan and monitor bp. She is to have and echo to assess her aortic valve. She is to continue a low fat diet w/ routine fitness. Melatonin for sleep. She is to follow up routinely .            "

## 2022-05-04 ENCOUNTER — HOSPITAL ENCOUNTER (OUTPATIENT)
Dept: BONE DENSITY | Facility: HOSPITAL | Age: 77
End: 2022-05-04

## 2022-05-31 RX ORDER — LEVOTHYROXINE SODIUM 88 UG/1
TABLET ORAL
Qty: 90 TABLET | Refills: 0 | Status: SHIPPED | OUTPATIENT
Start: 2022-05-31 | End: 2022-08-05 | Stop reason: SDUPTHER

## 2022-05-31 RX ORDER — CARVEDILOL 12.5 MG/1
TABLET ORAL
Qty: 210 TABLET | Refills: 3 | Status: SHIPPED | OUTPATIENT
Start: 2022-05-31 | End: 2022-08-05 | Stop reason: SDUPTHER

## 2022-08-04 ENCOUNTER — OFFICE VISIT (OUTPATIENT)
Dept: INTERNAL MEDICINE | Facility: CLINIC | Age: 77
End: 2022-08-04

## 2022-08-04 VITALS
TEMPERATURE: 98.6 F | DIASTOLIC BLOOD PRESSURE: 80 MMHG | WEIGHT: 122 LBS | HEIGHT: 62 IN | SYSTOLIC BLOOD PRESSURE: 150 MMHG | HEART RATE: 78 BPM | OXYGEN SATURATION: 98 % | BODY MASS INDEX: 22.45 KG/M2

## 2022-08-04 DIAGNOSIS — J45.909 ASTHMA, UNSPECIFIED ASTHMA SEVERITY, UNSPECIFIED WHETHER COMPLICATED, UNSPECIFIED WHETHER PERSISTENT: ICD-10-CM

## 2022-08-04 DIAGNOSIS — I10 ESSENTIAL HYPERTENSION: ICD-10-CM

## 2022-08-04 DIAGNOSIS — U07.1 COVID-19 VIRUS INFECTION: Primary | ICD-10-CM

## 2022-08-04 DIAGNOSIS — R54 ADVANCED AGE: ICD-10-CM

## 2022-08-04 LAB
EXPIRATION DATE: ABNORMAL
INTERNAL CONTROL: ABNORMAL
Lab: ABNORMAL
SARS-COV-2 AG UPPER RESP QL IA.RAPID: DETECTED

## 2022-08-04 PROCEDURE — 99214 OFFICE O/P EST MOD 30 MIN: CPT | Performed by: STUDENT IN AN ORGANIZED HEALTH CARE EDUCATION/TRAINING PROGRAM

## 2022-08-04 PROCEDURE — 87426 SARSCOV CORONAVIRUS AG IA: CPT | Performed by: STUDENT IN AN ORGANIZED HEALTH CARE EDUCATION/TRAINING PROGRAM

## 2022-08-04 NOTE — PATIENT INSTRUCTIONS
You can leave your home on August 8, 2022 if your symptoms are improving.    Wear a well-fitting mask around other people and do not travel through August 12, 2022.

## 2022-08-04 NOTE — PROGRESS NOTES
Kaleb Aguilar D.O.  Internal Medicine  [facility]  [address], [address]  [place] [address]  [phone]      Chief Complaint  Cough, Shortness of Breath, and Generalized Body Aches (Symptoms started on Tuesday 8/2/2022)    SUBJECTIVE    History of Present Illness    Dee Jorge is a 77 y.o. female who presents to the office today as an established patient of Dr Kacie Loving MD here today for an acute care visit.     2 night ago and last night she had a lot of dry cough as well as sneezing.. Yesterday and today she felt achy and tired. Pt states the people who she works around are not wearing masks.   She took a COVID 19 test yesterday at home and was negative.   She has a history of asthma and used her inhaler last night 1 time and her symptoms went away.   She felt some chills at home but had a normal temperature. No true fever that she knows of.     Allergies   Allergen Reactions   • Statins Myalgia   • Codeine Nausea And Vomiting   • Penicillins Hives   • Sulfa Antibiotics Nausea And Vomiting and Other (See Comments)     HEADACHES        Outpatient Medications Marked as Taking for the 8/4/22 encounter (Office Visit) with Kaleb Aguilar, DO   Medication Sig Dispense Refill   • carvedilol (COREG) 12.5 MG tablet TAKE ONE AND ONE-HALF TABLET BY MOUTH TWO TIMES A DAY WITH MEALS 210 tablet 3   • fluticasone (FLONASE) 50 MCG/ACT nasal spray SPRAY TWO SPRAYS IN EACH NOSTRIL ONCE DAILY 1 bottle 5   • levalbuterol (Xopenex HFA) 45 MCG/ACT inhaler Inhale 1-2 puffs Every 4 (Four) Hours As Needed for Wheezing. 1 each 5   • levalbuterol (XOPENEX) 0.63 MG/3ML nebulizer solution Take 1 ampule by nebulization Every 6 (Six) Hours As Needed for Wheezing.     • levothyroxine (SYNTHROID, LEVOTHROID) 88 MCG tablet TAKE ONE TABLET BY MOUTH DAILY 90 tablet 0   • olmesartan (Benicar) 20 MG tablet Take 1 tablet by mouth Daily. 90 tablet 1   • vitamin B-12 (CYANOCOBALAMIN) 1000 MCG tablet Take 1 tablet by  "mouth Daily. 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   • Bronchitis    • CAD (coronary artery disease)    • Cataract 10/05/2016    BILATERAL   • Constipation    • Cystocele    • Fatigue    • Foot trauma, left, initial encounter 02/13/2017   • Fracture of patella    • Hearing loss    • Hyperlipidemia    • Hypertension    • Hypothyroidism     ACQUIRED   • Insomnia    • Mastodynia 09/29/2015   • OA (osteoarthritis)    • Osteoporosis    • Patella fracture 2010    LEFT   • Positive occult stool blood test 10/04/2019   • Postmenopausal    • Rectocele    • Right shoulder strain 08/31/2002    D/T FALL, SEEN AT [facility]   • Rotator cuff tear, right 12/05/2014    [facility]W DR[facility] CHARLES BARTLETT   • Seborrheic keratosis    • Trigger finger, left middle finger 02/2016       OBJECTIVE    Vital Signs:   /80   Pulse 78   Temp 98.6 °F (37 °C) (Oral)   Ht 157.5 cm (62\")   Wt 55.3 kg (122 lb)   SpO2 98%   BMI 22.31 kg/m²     Physical Exam  Vitals reviewed.   Constitutional:       General: She is not in acute distress.     Appearance: Normal appearance. She is not ill-appearing.   HENT:      Head: Normocephalic and atraumatic.   Eyes:      General: No scleral icterus.  Cardiovascular:      Rate and Rhythm: Normal rate and regular rhythm.      Heart sounds: Murmur heard.    Systolic murmur is present with a grade of 4/6.  Pulmonary:      Effort: Pulmonary effort is normal. No respiratory distress.      Breath sounds: Normal breath sounds. No wheezing.   Neurological:      Mental Status: She is alert.   Psychiatric:         Mood and Affect: Mood normal.         Behavior: Behavior normal.         Thought Content: Thought content normal.                             ASSESSMENT & PLAN "     Diagnoses and all orders for this visit:    1. COVID-19 virus infection (Primary)  2. Advanced Age  3. Asthma  4. HTN  -COVID 19 positive in office today; symptoms consistent with mild COVID 19 infection  -symptoms began 2 days ago; on exam she is afebrile, satting 98% on room air, no acute respiratory distress, lungs clear to auscultation  -advised pt that COVID 19 in general is treated like other viral URI: cough/cold medications over the counter, tylenol and ibuprofen, ample fluid intake  -advised pt that there is an emergency approved medication (PAXLOVID) approved for COVID 19 in high risk patients to prevent the risk of progression to severe illness requiring hospitalization. Her risk factors for severe COVID 19 infection include HTN, advanced age, asthma. Discussed with her that the long term side effects are not known. Short term side effects that have been seen include muscle aches, diarrhea, bad taste in the mouth. She was agreeable to initiate treatment.   -I performed a drug interaction check and her current medications do not interact with COVID 19.   -renal function is appropriate for full dosed PAXLOVID  -informed pt to report to ER for worsening symptoms, feeling of inability to catch breath, chest pain, worsening fever or chills  -     POCT NEVAEH SARS-CoV-2 Antigen ISIDRO  -     Nirmatrelvir & Ritonavir (PAXLOVID) 20 x 150 MG & 10 x 100MG tablet therapy pack tablet; Take 3 tablets by mouth 2 (Two) Times a Day for 5 days.  Dispense: 30 tablet; Refill: 0        -provided pt with her isolation guidelines      You can leave your home on August 8, 2022 if your symptoms are improving.     Wear a well-fitting mask around other people and do not travel through August 12, 2022.        The following social determinates of health impact the patient's medical decision making: No social determinates of health were factored in to today's visit.     Follow Up  No follow-ups on file.    Patient/family had no  further questions at this time and verbalized understanding of the plan discussed today.

## 2022-08-05 RX ORDER — CARVEDILOL 12.5 MG/1
12.5 TABLET ORAL DAILY
Qty: 90 TABLET | Refills: 1 | Status: SHIPPED | OUTPATIENT
Start: 2022-08-05 | End: 2022-09-23 | Stop reason: SDUPTHER

## 2022-08-05 RX ORDER — LEVOTHYROXINE SODIUM 88 UG/1
88 TABLET ORAL DAILY
Qty: 90 TABLET | Refills: 1 | Status: SHIPPED | OUTPATIENT
Start: 2022-08-05 | End: 2023-02-13

## 2022-08-08 ENCOUNTER — TELEPHONE (OUTPATIENT)
Dept: INTERNAL MEDICINE | Facility: CLINIC | Age: 77
End: 2022-08-08

## 2022-08-08 NOTE — TELEPHONE ENCOUNTER
Caller: Narda Tafoya    Relationship: Emergency Contact    Best call back number: [phone]    What was the call regarding: PATIENT'S DAUGHTER STATED THAT PATIENT TESTED POSITIVE FOR COVID ON 08/04/2022 ON THE SAME DAY SHE CAME IN FOR AN APPOINTMENT. PATIENT IS NEEDING TO KNOW WHEN SHE IS ABLE TO RETURN BACK TO WORK. PATIENT'S DAUGHTER STATED THAT SHE IS DOING GOOD AND HAS BEEN TAKING PAXLOVID. PATIENT HAS NOT RETESTED YET. PATIENT'S DAUGHTER IS ASKING IF SHE IS NEEDING TO TEST AGAIN BEFORE RETURNING TO WORK. PLEASE ADVISE.     Do you require a callback: YES

## 2022-09-23 RX ORDER — CARVEDILOL 12.5 MG/1
12.5 TABLET ORAL 2 TIMES DAILY
Qty: 180 TABLET | Refills: 1 | Status: SHIPPED | OUTPATIENT
Start: 2022-09-23 | End: 2023-04-03

## 2022-09-23 RX ORDER — CARVEDILOL 12.5 MG/1
12.5 TABLET ORAL DAILY
Qty: 90 TABLET | Refills: 1 | Status: CANCELLED | OUTPATIENT
Start: 2022-09-23

## 2022-09-23 NOTE — TELEPHONE ENCOUNTER
PATIENT'S DAUGHTER CALLED TO ASK THAT THIS BE SENT ASAP TO THE PHARMACY AS THE PATIENT IS ALMOST OUT OF MEDICATION AND IS CONCERNED SHE WILL RUN OUT DUE TO THE INCREASE OF DOSAGE BUT NO NEW PRESCRIPTION HAS BEEN SENT TO THE PHARMACY YET.     PLEASE ADVISE PATIENT AND PATIENT'S DAUGHTER WHEN SENT TO PHARMACY ASAP.    CONTACT: [phone] (h)

## 2022-09-23 NOTE — TELEPHONE ENCOUNTER
Caller: Dee Jorge    Relationship: Self    Best call back number: [phone]    Requested Prescriptions:   Requested Prescriptions     Pending Prescriptions Disp Refills   • carvedilol (COREG) 12.5 MG tablet 90 tablet 1     Sig: Take 1 tablet by mouth Daily.        Pharmacy where request should be sent: [facility] #[id number] [facility] [id number] [place] [address] AT [facility] [phone] [facility] [phone] FX     Additional details provided by patient: PATIENT STATES WAS TOLD TO INCREASE PILLS TO ONE AND HALF PILLS AND NEEDS NEW PRESCRIPTION TO REFLECT THAT     Does the patient have less than a 3 day supply:  [x] Yes  [] No    Guerline Valdez, Devika Rep   09/23/22 08:07 EDT

## 2022-09-29 DIAGNOSIS — E78.2 MIXED HYPERLIPIDEMIA: Primary | ICD-10-CM

## 2022-09-29 DIAGNOSIS — E03.9 ACQUIRED HYPOTHYROIDISM: ICD-10-CM

## 2022-09-29 DIAGNOSIS — I10 ESSENTIAL HYPERTENSION: ICD-10-CM

## 2022-10-04 LAB
ALBUMIN SERPL-MCNC: 4.4 G/DL (ref 3.5–5.2)
ALBUMIN/GLOB SERPL: 2.4 G/DL
ALP SERPL-CCNC: 80 U/L (ref 39–117)
ALT SERPL-CCNC: 16 U/L (ref 1–33)
APPEARANCE UR: CLEAR
AST SERPL-CCNC: 25 U/L (ref 1–32)
BACTERIA #/AREA URNS HPF: NORMAL /HPF
BASOPHILS # BLD AUTO: 0.06 10*3/MM3 (ref 0–0.2)
BASOPHILS NFR BLD AUTO: 0.9 % (ref 0–1.5)
BILIRUB SERPL-MCNC: 0.4 MG/DL (ref 0–1.2)
BILIRUB UR QL STRIP: NEGATIVE
BUN SERPL-MCNC: 16 MG/DL (ref 8–23)
BUN/CREAT SERPL: 28.6 (ref 7–25)
CALCIUM SERPL-MCNC: 9.5 MG/DL (ref 8.6–10.5)
CASTS URNS QL MICRO: NORMAL /LPF
CHLORIDE SERPL-SCNC: 103 MMOL/L (ref 98–107)
CHOLEST SERPL-MCNC: 219 MG/DL (ref 0–200)
CO2 SERPL-SCNC: 27.9 MMOL/L (ref 22–29)
COLOR UR: YELLOW
CREAT SERPL-MCNC: 0.56 MG/DL (ref 0.57–1)
EGFRCR SERPLBLD CKD-EPI 2021: 93.5 ML/MIN/1.73
EOSINOPHIL # BLD AUTO: 0.3 10*3/MM3 (ref 0–0.4)
EOSINOPHIL NFR BLD AUTO: 4.4 % (ref 0.3–6.2)
EPI CELLS #/AREA URNS HPF: NORMAL /HPF (ref 0–10)
ERYTHROCYTE [DISTWIDTH] IN BLOOD BY AUTOMATED COUNT: 13 % (ref 12.3–15.4)
GLOBULIN SER CALC-MCNC: 1.8 GM/DL
GLUCOSE SERPL-MCNC: 100 MG/DL (ref 65–99)
GLUCOSE UR QL STRIP: NEGATIVE
HCT VFR BLD AUTO: 33.3 % (ref 34–46.6)
HDLC SERPL-MCNC: 64 MG/DL (ref 40–60)
HGB BLD-MCNC: 10.9 G/DL (ref 12–15.9)
HGB UR QL STRIP: NEGATIVE
IMM GRANULOCYTES # BLD AUTO: 0.02 10*3/MM3 (ref 0–0.05)
IMM GRANULOCYTES NFR BLD AUTO: 0.3 % (ref 0–0.5)
KETONES UR QL STRIP: NEGATIVE
LDLC SERPL CALC-MCNC: 141 MG/DL (ref 0–100)
LEUKOCYTE ESTERASE UR QL STRIP: NEGATIVE
LYMPHOCYTES # BLD AUTO: 1.32 10*3/MM3 (ref 0.7–3.1)
LYMPHOCYTES NFR BLD AUTO: 19.2 % (ref 19.6–45.3)
MCH RBC QN AUTO: 29.5 PG (ref 26.6–33)
MCHC RBC AUTO-ENTMCNC: 32.7 G/DL (ref 31.5–35.7)
MCV RBC AUTO: 90 FL (ref 79–97)
MICRO URNS: NORMAL
MICRO URNS: NORMAL
MONOCYTES # BLD AUTO: 0.51 10*3/MM3 (ref 0.1–0.9)
MONOCYTES NFR BLD AUTO: 7.4 % (ref 5–12)
NEUTROPHILS # BLD AUTO: 4.66 10*3/MM3 (ref 1.7–7)
NEUTROPHILS NFR BLD AUTO: 67.8 % (ref 42.7–76)
NITRITE UR QL STRIP: NEGATIVE
NRBC BLD AUTO-RTO: 0 /100 WBC (ref 0–0.2)
PH UR STRIP: 6 [PH] (ref 5–7.5)
PLATELET # BLD AUTO: 372 10*3/MM3 (ref 140–450)
POTASSIUM SERPL-SCNC: 3.8 MMOL/L (ref 3.5–5.2)
PROT SERPL-MCNC: 6.2 G/DL (ref 6–8.5)
PROT UR QL STRIP: NEGATIVE
RBC # BLD AUTO: 3.7 10*6/MM3 (ref 3.77–5.28)
RBC #/AREA URNS HPF: NORMAL /HPF (ref 0–2)
SODIUM SERPL-SCNC: 142 MMOL/L (ref 136–145)
SP GR UR STRIP: 1.01 (ref 1–1.03)
TRIGL SERPL-MCNC: 82 MG/DL (ref 0–150)
TSH SERPL DL<=0.005 MIU/L-ACNC: 1.64 UIU/ML (ref 0.27–4.2)
URINALYSIS REFLEX: NORMAL
UROBILINOGEN UR STRIP-MCNC: 0.2 MG/DL (ref 0.2–1)
VLDLC SERPL CALC-MCNC: 14 MG/DL (ref 5–40)
WBC # BLD AUTO: 6.87 10*3/MM3 (ref 3.4–10.8)
WBC #/AREA URNS HPF: NORMAL /HPF (ref 0–5)

## 2022-10-10 ENCOUNTER — OFFICE VISIT (OUTPATIENT)
Dept: INTERNAL MEDICINE | Facility: CLINIC | Age: 77
End: 2022-10-10

## 2022-10-10 VITALS
OXYGEN SATURATION: 100 % | DIASTOLIC BLOOD PRESSURE: 94 MMHG | WEIGHT: 118 LBS | TEMPERATURE: 98.6 F | HEIGHT: 62 IN | BODY MASS INDEX: 21.71 KG/M2 | SYSTOLIC BLOOD PRESSURE: 161 MMHG | HEART RATE: 78 BPM

## 2022-10-10 DIAGNOSIS — I25.10 CORONARY ARTERY DISEASE DUE TO LIPID RICH PLAQUE: ICD-10-CM

## 2022-10-10 DIAGNOSIS — I10 ESSENTIAL HYPERTENSION: ICD-10-CM

## 2022-10-10 DIAGNOSIS — D64.9 ANEMIA, UNSPECIFIED TYPE: ICD-10-CM

## 2022-10-10 DIAGNOSIS — E03.9 ACQUIRED HYPOTHYROIDISM: ICD-10-CM

## 2022-10-10 DIAGNOSIS — E78.2 MIXED HYPERLIPIDEMIA: ICD-10-CM

## 2022-10-10 DIAGNOSIS — Z00.00 HEALTHCARE MAINTENANCE: Primary | ICD-10-CM

## 2022-10-10 DIAGNOSIS — I25.83 CORONARY ARTERY DISEASE DUE TO LIPID RICH PLAQUE: ICD-10-CM

## 2022-10-10 DIAGNOSIS — Z12.31 ENCOUNTER FOR SCREENING MAMMOGRAM FOR BREAST CANCER: ICD-10-CM

## 2022-10-10 PROCEDURE — G0439 PPPS, SUBSEQ VISIT: HCPCS | Performed by: INTERNAL MEDICINE

## 2022-10-10 PROCEDURE — G0008 ADMIN INFLUENZA VIRUS VAC: HCPCS | Performed by: INTERNAL MEDICINE

## 2022-10-10 PROCEDURE — 1159F MED LIST DOCD IN RCRD: CPT | Performed by: INTERNAL MEDICINE

## 2022-10-10 PROCEDURE — 1170F FXNL STATUS ASSESSED: CPT | Performed by: INTERNAL MEDICINE

## 2022-10-10 PROCEDURE — 1126F AMNT PAIN NOTED NONE PRSNT: CPT | Performed by: INTERNAL MEDICINE

## 2022-10-10 PROCEDURE — 99213 OFFICE O/P EST LOW 20 MIN: CPT | Performed by: INTERNAL MEDICINE

## 2022-10-10 PROCEDURE — 90662 IIV NO PRSV INCREASED AG IM: CPT | Performed by: INTERNAL MEDICINE

## 2022-10-10 RX ORDER — AMLODIPINE BESYLATE 2.5 MG/1
2.5 TABLET ORAL DAILY
Qty: 90 TABLET | Refills: 3 | Status: SHIPPED | OUTPATIENT
Start: 2022-10-10 | End: 2023-01-12 | Stop reason: ALTCHOICE

## 2022-10-10 NOTE — PROGRESS NOTES
The ABCs of the Annual Wellness Visit  Subsequent Medicare Wellness Visit    Chief Complaint   Patient presents with   • Medicare Wellness-subsequent     AWV   • Hypertension   • Hyperlipidemia   • Hypothyroidism      Subjective    History of Present Illness:  Dee Jorge is a 77 y.o. female who presents for a Subsequent Medicare Wellness Visit, to review chronic issues, and to discuss acute needs. Patient is doing well today. She has lost 7 pounds as she started walking her dog twice a day. She lost her spouse in feb and he did cook in the home.   Patient has hypertension. She previously was taking carvediolol 12.5 mg 1 1/2 tab bid. She was not dispensed enough tablets to take additional half tablet. Patient has hypothyroidism. Has CAD. Can not tolerate statin medications. repatha was cost prohibitive.       Patient has continued routine eye, dental, and skin examinations.   She declines further covid vac.   Due for mammo. Had difficulty scheduling.       The following portions of the patient's history were reviewed and   updated as appropriate: allergies, current medications, past family history, past medical history, past social history, past surgical history and problem list.    Compared to one year ago, the patient feels her physical   health is the same.    Compared to one year ago, the patient feels her mental   health is the same.    Recent Hospitalizations:  She was not admitted to the hospital during the last year.       Current Medical Providers:  Patient Care Team:  Kacie Loving MD as PCP - General  Kacie Loving MD as PCP - Family Medicine  Jasmin Mehta MD as Consulting Physician (Cardiology)    Outpatient Medications Prior to Visit   Medication Sig Dispense Refill   • carvedilol (COREG) 12.5 MG tablet Take 1 tablet by mouth 2 (Two) Times a Day. 180 tablet 1   • fluticasone (FLONASE) 50 MCG/ACT nasal spray SPRAY TWO SPRAYS IN EACH NOSTRIL ONCE DAILY 1 bottle 5   • levalbuterol (Xopenex HFA)  "45 MCG/ACT inhaler Inhale 1-2 puffs Every 4 (Four) Hours As Needed for Wheezing. 1 each 5   • levalbuterol (XOPENEX) 0.63 MG/3ML nebulizer solution Take 1 ampule by nebulization Every 6 (Six) Hours As Needed for Wheezing.     • levothyroxine (SYNTHROID, LEVOTHROID) 88 MCG tablet Take 1 tablet by mouth Daily. 90 tablet 1   • olmesartan (Benicar) 20 MG tablet Take 1 tablet by mouth Daily. 90 tablet 1   • vitamin B-12 (CYANOCOBALAMIN) 1000 MCG tablet Take 1 tablet by mouth Daily. 90 tablet 4     No facility-administered medications prior to visit.       No opioid medication identified on active medication list. I have reviewed chart for other potential  high risk medication/s and harmful drug interactions in the elderly.          Aspirin is not on active medication list.  Aspirin use is not indicated based on review of current medical condition/s. Risk of harm outweighs potential benefits.  .    Patient Active Problem List   Diagnosis   • Anxiety   • Mixed hyperlipidemia   • Essential hypertension   • Hypothyroidism   • Pain in shoulder   • Leg edema, left   • Foot trauma   • Age-related osteoporosis without current pathological fracture   • Hearing loss   • High coronary artery calcium score   • Occult blood positive stool   • CAD (coronary artery disease)     Advance Care Planning  Advance Directive is not on file.  ACP discussion was held with the patient during this visit. Patient has an advance directive (not in EMR), copy requested.          Objective    Vitals:    10/10/22 1320   BP: 161/94   BP Location: Right arm   Patient Position: Sitting   Cuff Size: Adult   Pulse: 78   Temp: 98.6 °F (37 °C)   TempSrc: Oral   SpO2: 100%   Weight: 53.5 kg (118 lb)   Height: 157.5 cm (62.01\")   PainSc: 0-No pain     Estimated body mass index is 21.58 kg/m² as calculated from the following:    Height as of this encounter: 157.5 cm (62.01\").    Weight as of this encounter: 53.5 kg (118 lb).    BMI is within normal parameters. " No other follow-up for BMI required.      Does the patient have evidence of cognitive impairment? No    Physical Exam  Vitals and nursing note reviewed.   Constitutional:       Appearance: Normal appearance. She is well-developed.   HENT:      Head: Normocephalic and atraumatic.      Right Ear: Tympanic membrane and external ear normal.      Left Ear: Tympanic membrane and external ear normal.      Nose: Nose normal.      Mouth/Throat:      Mouth: Mucous membranes are moist.   Eyes:      Extraocular Movements: Extraocular movements intact.      Pupils: Pupils are equal, round, and reactive to light.   Cardiovascular:      Rate and Rhythm: Normal rate and regular rhythm.      Pulses: Normal pulses.      Heart sounds: Normal heart sounds.   Pulmonary:      Effort: Pulmonary effort is normal. No respiratory distress.      Breath sounds: Normal breath sounds.   Abdominal:      General: Abdomen is flat.      Palpations: Abdomen is soft.   Musculoskeletal:         General: Normal range of motion.      Cervical back: Normal range of motion and neck supple.   Skin:     General: Skin is warm and dry.   Neurological:      General: No focal deficit present.      Mental Status: She is alert and oriented to person, place, and time.   Psychiatric:         Mood and Affect: Mood normal.         Behavior: Behavior normal.         Thought Content: Thought content normal.         Judgment: Judgment normal.       Lab Results   Component Value Date    CHLPL 219 (H) 10/03/2022    TRIG 82 10/03/2022    HDL 64 (H) 10/03/2022     (H) 10/03/2022    VLDL 14 10/03/2022            HEALTH RISK ASSESSMENT    Smoking Status:  Social History     Tobacco Use   Smoking Status Never   Smokeless Tobacco Never     Alcohol Consumption:  Social History     Substance and Sexual Activity   Alcohol Use No    Comment: daily caffiene     Fall Risk Screen:    STEADI Fall Risk Assessment was completed, and patient is at LOW risk for falls.Assessment  completed on:10/10/2022    Depression Screening:  PHQ-2/PHQ-9 Depression Screening 10/10/2022   Retired PHQ-9 Total Score -   Retired Total Score -   Little Interest or Pleasure in Doing Things 0-->not at all   Feeling Down, Depressed or Hopeless 0-->not at all   PHQ-9: Brief Depression Severity Measure Score 0       Health Habits and Functional and Cognitive Screening:  Functional & Cognitive Status 10/10/2022   Do you have difficulty preparing food and eating? No   Do you have difficulty bathing yourself, getting dressed or grooming yourself? No   Do you have difficulty using the toilet? No   Do you have difficulty moving around from place to place? No   Do you have trouble with steps or getting out of a bed or a chair? No   Current Diet Well Balanced Diet   Dental Exam Up to date   Eye Exam Up to date   Exercise (times per week) 7 times per week   Current Exercises Include Walking   Current Exercise Activities Include -   Do you need help using the phone?  No   Are you deaf or do you have serious difficulty hearing?  No   Do you need help with transportation? No   Do you need help shopping? No   Do you need help preparing meals?  No   Do you need help with housework?  No   Do you need help with laundry? No   Do you need help taking your medications? No   Do you need help managing money? No   Do you ever drive or ride in a car without wearing a seat belt? No   Have you felt unusual stress, anger or loneliness in the last month? No   Who do you live with? Alone   If you need help, do you have trouble finding someone available to you? No   Have you been bothered in the last four weeks by sexual problems? No   Do you have difficulty concentrating, remembering or making decisions? No       Age-appropriate Screening Schedule:  Refer to the list below for future screening recommendations based on patient's age, sex and/or medical conditions. Orders for these recommended tests are listed in the plan section. The patient  has been provided with a written plan.    Health Maintenance   Topic Date Due   • ZOSTER VACCINE (2 of 3) 02/26/2008   • DXA SCAN  08/21/2019   • MAMMOGRAM  09/09/2021   • INFLUENZA VACCINE  08/01/2022   • LIPID PANEL  10/03/2023   • TDAP/TD VACCINES (2 - Tdap) 07/15/2024              Assessment & Plan   CMS Preventative Services Quick Reference  Risk Factors Identified During Encounter  Immunizations Discussed/Encouraged (specific Immunizations; Hepatitis A Vaccine/Series, Influenza and Shingrix  The above risks/problems have been discussed with the patient.  Follow up actions/plans if indicated are seen below in the Assessment/Plan Section.  Pertinent information has been shared with the patient in the After Visit Summary.    Diagnoses and all orders for this visit:    1. Healthcare maintenance (Primary)    2. Encounter for screening mammogram for breast cancer  -     Mammo screening digital tomosynthesis bilateral w CAD; Future    3. Acquired hypothyroidism    4. Essential hypertension    5. Mixed hyperlipidemia  -     Discontinue: Evolocumab (REPATHA) solution auto-injector SureClick injection; Inject 1 mL under the skin into the appropriate area as directed Every 14 (Fourteen) Days.  Dispense: 2 mL; Refill: 5  -     Evolocumab (REPATHA) solution auto-injector SureClick injection; Inject 1 mL under the skin into the appropriate area as directed Every 14 (Fourteen) Days.  Dispense: 2 mL; Refill: 5    6. Anemia, unspecified type  -     CBC w AUTO Differential; Future  -     Vitamin B12; Future  -     Folate; Future  -     Iron and TIBC; Future    7. Coronary artery disease due to lipid rich plaque  -     Discontinue: Evolocumab (REPATHA) solution auto-injector SureClick injection; Inject 1 mL under the skin into the appropriate area as directed Every 14 (Fourteen) Days.  Dispense: 2 mL; Refill: 5  -     Evolocumab (REPATHA) solution auto-injector SureClick injection; Inject 1 mL under the skin into the appropriate  area as directed Every 14 (Fourteen) Days.  Dispense: 2 mL; Refill: 5    Other orders  -     amLODIPine (NORVASC) 2.5 MG tablet; Take 1 tablet by mouth Daily.  Dispense: 90 tablet; Refill: 3        Follow Up:   Return in about 6 months (around 4/10/2023) for Recheck.   Patient w/ hypertension. She notes that she feels better with current dosing of carvedilol. Will continue this. She notes upset stomach with olmesartan. Will try amlodipine 2.5 mg daily. She will monitor bp at home. Consider losartan in the future. She will continue med for thyroid. Will try to get repatha through specialty pharmacy. She was intolerant to all statins as well as zetia.  She will consume a diet that is low in sodium.     An After Visit Summary and PPPS were made available to the patient.          I spent 45 minutes caring for Dee on this date of service. This time includes time spent by me in the following activities:preparing for the visit, reviewing tests, obtaining and/or reviewing a separately obtained history, performing a medically appropriate examination and/or evaluation , counseling and educating the patient/family/caregiver, ordering medications, tests, or procedures, referring and communicating with other health care professionals , documenting information in the medical record and independently interpreting results and communicating that information with the patient/family/caregiver

## 2022-10-11 LAB
BASOPHILS # BLD AUTO: 0.1 X10E3/UL (ref 0–0.2)
BASOPHILS NFR BLD AUTO: 1 %
EOSINOPHIL # BLD AUTO: 0.3 X10E3/UL (ref 0–0.4)
EOSINOPHIL NFR BLD AUTO: 3 %
ERYTHROCYTE [DISTWIDTH] IN BLOOD BY AUTOMATED COUNT: 12.8 % (ref 11.7–15.4)
FOLATE SERPL-MCNC: 10.9 NG/ML
HCT VFR BLD AUTO: 36 % (ref 34–46.6)
HGB BLD-MCNC: 11.6 G/DL (ref 11.1–15.9)
IMM GRANULOCYTES # BLD AUTO: 0 X10E3/UL (ref 0–0.1)
IMM GRANULOCYTES NFR BLD AUTO: 0 %
IRON SATN MFR SERPL: 18 % (ref 15–55)
IRON SERPL-MCNC: 68 UG/DL (ref 27–139)
LYMPHOCYTES # BLD AUTO: 1.8 X10E3/UL (ref 0.7–3.1)
LYMPHOCYTES NFR BLD AUTO: 19 %
MCH RBC QN AUTO: 29.3 PG (ref 26.6–33)
MCHC RBC AUTO-ENTMCNC: 32.2 G/DL (ref 31.5–35.7)
MCV RBC AUTO: 91 FL (ref 79–97)
MONOCYTES # BLD AUTO: 0.7 X10E3/UL (ref 0.1–0.9)
MONOCYTES NFR BLD AUTO: 7 %
NEUTROPHILS # BLD AUTO: 6.8 X10E3/UL (ref 1.4–7)
NEUTROPHILS NFR BLD AUTO: 70 %
PLATELET # BLD AUTO: 437 X10E3/UL (ref 150–450)
RBC # BLD AUTO: 3.96 X10E6/UL (ref 3.77–5.28)
TIBC SERPL-MCNC: 382 UG/DL (ref 250–450)
UIBC SERPL-MCNC: 314 UG/DL (ref 118–369)
VIT B12 SERPL-MCNC: 330 PG/ML (ref 232–1245)
WBC # BLD AUTO: 9.6 X10E3/UL (ref 3.4–10.8)

## 2022-10-11 RX ORDER — LANOLIN ALCOHOL/MO/W.PET/CERES
1000 CREAM (GRAM) TOPICAL DAILY
Qty: 90 TABLET | Refills: 4 | Status: SHIPPED | OUTPATIENT
Start: 2022-10-11

## 2022-10-31 ENCOUNTER — TELEPHONE (OUTPATIENT)
Dept: INTERNAL MEDICINE | Facility: CLINIC | Age: 77
End: 2022-10-31

## 2022-11-10 ENCOUNTER — TELEPHONE (OUTPATIENT)
Dept: INTERNAL MEDICINE | Facility: CLINIC | Age: 77
End: 2022-11-10

## 2022-11-10 NOTE — TELEPHONE ENCOUNTER
Caller: Dee Jorge    Relationship: Self    Best call back number: [phone]    What medications are you currently taking:   Current Outpatient Medications on File Prior to Visit   Medication Sig Dispense Refill   • vitamin B-12 (CYANOCOBALAMIN) 1000 MCG tablet Take 1 tablet by mouth Daily. 90 tablet 4   • amLODIPine (NORVASC) 2.5 MG tablet Take 1 tablet by mouth Daily. 90 tablet 3   • carvedilol (COREG) 12.5 MG tablet Take 1 tablet by mouth 2 (Two) Times a Day. 180 tablet 1   • Evolocumab (REPATHA) solution auto-injector SureClick injection Inject 1 mL under the skin into the appropriate area as directed Every 14 (Fourteen) Days. 2 mL 5   • fluticasone (FLONASE) 50 MCG/ACT nasal spray SPRAY TWO SPRAYS IN EACH NOSTRIL ONCE DAILY 1 bottle 5   • levalbuterol (Xopenex HFA) 45 MCG/ACT inhaler Inhale 1-2 puffs Every 4 (Four) Hours As Needed for Wheezing. 1 each 5   • levalbuterol (XOPENEX) 0.63 MG/3ML nebulizer solution Take 1 ampule by nebulization Every 6 (Six) Hours As Needed for Wheezing.     • levothyroxine (SYNTHROID, LEVOTHROID) 88 MCG tablet Take 1 tablet by mouth Daily. 90 tablet 1   • olmesartan (Benicar) 20 MG tablet Take 1 tablet by mouth Daily. 90 tablet 1     No current facility-administered medications on file prior to visit.          When did you start taking these medications: N/A    Which medication are you concerned about: BOSSMAN     Who prescribed you this medication: DR. GAINES     What are your concerns: PATIENT CALLED AND SAID SHE NEEDS MORE INFORMATION ON HOW TO USE REPATHA     SHE SAID SHE'S GIVEN HERSELF INJECTIONS BEFORE BUT NEEDS ASSISTANCE WITH THIS ONE, WANTING TO SEE IF SOMEONE CAN SHOW HER     REQUESTED CALLBACK, [facility] OK [facility]    How long have you had these concerns: N/A

## 2022-12-27 DIAGNOSIS — I25.10 CORONARY ARTERY DISEASE DUE TO LIPID RICH PLAQUE: ICD-10-CM

## 2022-12-27 DIAGNOSIS — E78.2 MIXED HYPERLIPIDEMIA: ICD-10-CM

## 2022-12-27 DIAGNOSIS — I25.83 CORONARY ARTERY DISEASE DUE TO LIPID RICH PLAQUE: ICD-10-CM

## 2023-01-12 ENCOUNTER — OFFICE VISIT (OUTPATIENT)
Dept: CARDIOLOGY | Facility: CLINIC | Age: 78
End: 2023-01-12
Payer: MEDICARE

## 2023-01-12 VITALS
WEIGHT: 120 LBS | BODY MASS INDEX: 22.08 KG/M2 | DIASTOLIC BLOOD PRESSURE: 76 MMHG | SYSTOLIC BLOOD PRESSURE: 122 MMHG | RESPIRATION RATE: 16 BRPM | HEART RATE: 74 BPM | HEIGHT: 62 IN

## 2023-01-12 DIAGNOSIS — I10 ESSENTIAL HYPERTENSION: ICD-10-CM

## 2023-01-12 DIAGNOSIS — E78.2 MIXED HYPERLIPIDEMIA: ICD-10-CM

## 2023-01-12 DIAGNOSIS — R01.1 HEART MURMUR: ICD-10-CM

## 2023-01-12 DIAGNOSIS — I25.10 CORONARY ARTERY DISEASE INVOLVING NATIVE HEART WITHOUT ANGINA PECTORIS, UNSPECIFIED VESSEL OR LESION TYPE: Primary | ICD-10-CM

## 2023-01-12 DIAGNOSIS — I35.1 NONRHEUMATIC AORTIC VALVE INSUFFICIENCY: ICD-10-CM

## 2023-01-12 PROCEDURE — 93000 ELECTROCARDIOGRAM COMPLETE: CPT | Performed by: INTERNAL MEDICINE

## 2023-01-12 PROCEDURE — 99214 OFFICE O/P EST MOD 30 MIN: CPT | Performed by: INTERNAL MEDICINE

## 2023-01-12 NOTE — PROGRESS NOTES
"      CARDIOLOGY    Jasmin Mehta MD    ENCOUNTER DATE:  2023    Dee Jorge / 77 y.o. / female        CHIEF COMPLAINT / REASON FOR OFFICE VISIT     Heart Problem (Yearly follow up/_____________/Coronary artery disease/High coronary artery calcium score )      HISTORY OF PRESENT ILLNESS       HPI    Dee Jorge is a 77 y.o. female     This is a woman whom I saw in the remote past and then came back to see me 18. She has a history of hypertension and hyperlipidemia but is statin intolerant. She says she has tried about 5 different statins and cannot tolerate them. She had an echocardiogram in 2018 which showed mild asymmetric septal hypertrophy, normal LV systolic function with an ejection fraction of 59%, grade I diastolic dysfunction, calcified aortic valve with mild-to-moderate aortic regurgitation. She had a calcium score of 404 on 18.      Unfortunately her   in 2022.  She remains active and walks her dogs 1/2 miles twice a day.  No chest pain, shortness of breath or palpitations.    REVIEW OF SYSTEMS     Review of Systems   Constitutional: Negative for chills, fever, weight gain and weight loss.   Cardiovascular: Negative for leg swelling.   Respiratory: Negative for cough, snoring and wheezing.    Hematologic/Lymphatic: Negative for bleeding problem. Does not bruise/bleed easily.   Skin: Negative for color change.   Musculoskeletal: Negative for falls, joint pain and myalgias.   Gastrointestinal: Negative for melena.   Genitourinary: Negative for hematuria.   Neurological: Negative for excessive daytime sleepiness.   Psychiatric/Behavioral: Negative for depression. The patient is not nervous/anxious.          VITAL SIGNS     Visit Vitals  /76 (BP Location: Right arm, Patient Position: Sitting, Cuff Size: Adult)   Pulse 74   Resp 16   Ht 157.5 cm (62\")   Wt 54.4 kg (120 lb)   LMP  (LMP Unknown)   BMI 21.95 kg/m²         Wt Readings from Last 3 Encounters: "   01/12/23 54.4 kg (120 lb)   10/10/22 53.5 kg (118 lb)   08/04/22 55.3 kg (122 lb)     Body mass index is 21.95 kg/m².      PHYSICAL EXAMINATION     Constitutional:       General: Not in acute distress.  Neck:      Vascular: No carotid bruit or JVD.   Pulmonary:      Effort: Pulmonary effort is normal.      Breath sounds: Normal breath sounds.   Cardiovascular:      Normal rate. Regular rhythm.      Murmurs: There is a grade 3/6 systolic murmur.   Psychiatric:         Mood and Affect: Mood and affect normal.           REVIEWED DATA       ECG 12 Lead    Date/Time: 1/12/2023 10:48 AM  Performed by: Jasmin Mehta MD  Authorized by: Jasmin Mehta MD   Comparison: compared with previous ECG from 1/13/2022  Similar to previous ECG  Rhythm: sinus rhythm  BPM: 74  Conduction: conduction normal  ST Segments: ST segments normal  T Waves: T waves normal    Clinical impression: normal ECG              Lipid Panel    Lipid Panel 3/21/22 10/3/22   Total Cholesterol 232 (A) 219 (A)   Triglycerides 103 82   HDL Cholesterol 65 64 (A)   VLDL Cholesterol 18 14   LDL Cholesterol  149 (A) 141 (A)   (A) Abnormal value       Comments are available for some flowsheets but are not being displayed.             Lab Results   Component Value Date    GLUCOSE 100 (H) 10/03/2022    BUN 16 10/03/2022    CREATININE 0.56 (L) 10/03/2022    EGFRIFNONA 81 09/22/2021    EGFRIFAFRI 98 09/22/2021    BCR 28.6 (H) 10/03/2022    K 3.8 10/03/2022    CO2 27.9 10/03/2022    CALCIUM 9.5 10/03/2022    PROTENTOTREF 6.2 10/03/2022    ALBUMIN 4.40 10/03/2022    LABIL2 2.4 10/03/2022    AST 25 10/03/2022    ALT 16 10/03/2022       ASSESSMENT & PLAN      Diagnosis Plan   1. Coronary artery disease involving native heart without angina pectoris, unspecified vessel or lesion type  ECG 12 Lead    Adult Transthoracic Echo Complete W/ Cont if Necessary Per Protocol      2. Essential hypertension  ECG 12 Lead    Adult Transthoracic Echo Complete W/ Cont if  Necessary Per Protocol      3. Mixed hyperlipidemia  ECG 12 Lead    Adult Transthoracic Echo Complete W/ Cont if Necessary Per Protocol      4. Heart murmur  ECG 12 Lead    Adult Transthoracic Echo Complete W/ Cont if Necessary Per Protocol      5. Nonrheumatic aortic valve insufficiency  ECG 12 Lead    Adult Transthoracic Echo Complete W/ Cont if Necessary Per Protocol            1.  Coronary artery disease.  No anginal symptoms.  She denies chest pain or shortness of breath.  Continue current medications.  She does not tolerate statins.  Zetia caused diarrhea.  She recently started Repatha.  2.  Hypertension.  Controlled  3.  Hyperlipidemia.  As above.  Not tolerated multiple medications.  Currently on Repatha.  4.  Mild to moderate aortic regurgitation.  She does have a murmur at the upper right sternal border.  I recommend that she have an echocardiogram.  One of my nurses or I will go over the results when becomes available.      Follow-up in 1 year unless something shows up on her echo and it makes me need to see her earlier      Orders Placed This Encounter   Procedures   • ECG 12 Lead     This order was created via procedure documentation     Order Specific Question:   Release to patient     Answer:   Routine Release   • Adult Transthoracic Echo Complete W/ Cont if Necessary Per Protocol     Standing Status:   Future     Standing Expiration Date:   1/12/2024     Order Specific Question:   Reason for exam?     Answer:   Valvular Function     Future Appointments       Provider Department Center    4/3/2023 9:20 AM [facility] [place]    4/10/2023 10:00 AM Kacie Loving MD [facility] PRIMARY CARE [facility]              MEDICATIONS         Discharge Medications          Accurate as of January 12, 2023 10:49 AM. If you have any questions, ask your nurse or doctor.            Continue These Medications      Instructions Start Date   carvedilol 12.5 MG  tablet  Commonly known as: COREG   12.5 mg, Oral, 2 Times Daily      Evolocumab solution auto-injector SureClick injection  Commonly known as: REPATHA   140 mg, Subcutaneous, Every 14 Days      fluticasone 50 MCG/ACT nasal spray  Commonly known as: FLONASE   SPRAY TWO SPRAYS IN EACH NOSTRIL ONCE DAILY      levalbuterol 45 MCG/ACT inhaler  Commonly known as: Xopenex HFA   1-2 puffs, Inhalation, Every 4 Hours PRN      levothyroxine 88 MCG tablet  Commonly known as: SYNTHROID, LEVOTHROID   88 mcg, Oral, Daily      vitamin B-12 1000 MCG tablet  Commonly known as: CYANOCOBALAMIN   1,000 mcg, Oral, Daily               Jasmin Mehta MD  01/12/23  10:49 EST    Part of this note may be an electronic transcription/translation of spoken language to printed text using the Dragon dictation system.

## 2023-01-30 ENCOUNTER — HOSPITAL ENCOUNTER (OUTPATIENT)
Dept: CARDIOLOGY | Facility: HOSPITAL | Age: 78
Discharge: HOME | End: 2023-01-30
Admitting: INTERNAL MEDICINE
Payer: MEDICARE

## 2023-01-30 ENCOUNTER — TELEPHONE (OUTPATIENT)
Dept: CARDIOLOGY | Facility: CLINIC | Age: 78
End: 2023-01-30
Payer: MEDICARE

## 2023-01-30 VITALS
BODY MASS INDEX: 22.08 KG/M2 | WEIGHT: 120 LBS | OXYGEN SATURATION: 98 % | SYSTOLIC BLOOD PRESSURE: 140 MMHG | HEIGHT: 62 IN | HEART RATE: 102 BPM | DIASTOLIC BLOOD PRESSURE: 80 MMHG

## 2023-01-30 DIAGNOSIS — I25.10 CORONARY ARTERY DISEASE INVOLVING NATIVE HEART WITHOUT ANGINA PECTORIS, UNSPECIFIED VESSEL OR LESION TYPE: ICD-10-CM

## 2023-01-30 DIAGNOSIS — I35.1 NONRHEUMATIC AORTIC VALVE INSUFFICIENCY: ICD-10-CM

## 2023-01-30 DIAGNOSIS — I10 ESSENTIAL HYPERTENSION: ICD-10-CM

## 2023-01-30 DIAGNOSIS — R01.1 HEART MURMUR: ICD-10-CM

## 2023-01-30 DIAGNOSIS — E78.2 MIXED HYPERLIPIDEMIA: ICD-10-CM

## 2023-01-30 LAB
AORTIC ARCH: 2.7 CM
AORTIC DIMENSIONLESS INDEX: 0.2 (DI)
ASCENDING AORTA: 3.6 CM
BH CV ECHO LEFT VENTRICLE GLOBAL LONGITUDINAL STRAIN: -19 %
BH CV ECHO MEAS - ACS: 1.06 CM
BH CV ECHO MEAS - AI P1/2T: 296.9 MSEC
BH CV ECHO MEAS - AO MAX PG: 52.4 MMHG
BH CV ECHO MEAS - AO MEAN PG: 33.8 MMHG
BH CV ECHO MEAS - AO ROOT DIAM: 3.6 CM
BH CV ECHO MEAS - AO V2 MAX: 362 CM/SEC
BH CV ECHO MEAS - AO V2 VTI: 80.4 CM
BH CV ECHO MEAS - AVA(I,D): 0.87 CM2
BH CV ECHO MEAS - EDV(CUBED): 64.6 ML
BH CV ECHO MEAS - EDV(MOD-SP2): 38 ML
BH CV ECHO MEAS - EDV(MOD-SP4): 54 ML
BH CV ECHO MEAS - EF(MOD-BP): 64.5 %
BH CV ECHO MEAS - EF(MOD-SP2): 68.4 %
BH CV ECHO MEAS - EF(MOD-SP4): 55.6 %
BH CV ECHO MEAS - ESV(CUBED): 16.7 ML
BH CV ECHO MEAS - ESV(MOD-SP2): 12 ML
BH CV ECHO MEAS - ESV(MOD-SP4): 24 ML
BH CV ECHO MEAS - FS: 36.3 %
BH CV ECHO MEAS - IVS/LVPW: 1.08 CM
BH CV ECHO MEAS - IVSD: 1.32 CM
BH CV ECHO MEAS - LAT PEAK E' VEL: 9 CM/SEC
BH CV ECHO MEAS - LV DIASTOLIC VOL/BSA (35-75): 37.6 CM2
BH CV ECHO MEAS - LV MASS(C)D: 181.1 GRAMS
BH CV ECHO MEAS - LV MAX PG: 2.7 MMHG
BH CV ECHO MEAS - LV MEAN PG: 1.72 MMHG
BH CV ECHO MEAS - LV SYSTOLIC VOL/BSA (12-30): 16.7 CM2
BH CV ECHO MEAS - LV V1 MAX: 82 CM/SEC
BH CV ECHO MEAS - LV V1 VTI: 18.2 CM
BH CV ECHO MEAS - LVIDD: 4 CM
BH CV ECHO MEAS - LVIDS: 2.6 CM
BH CV ECHO MEAS - LVOT AREA: 3.8 CM2
BH CV ECHO MEAS - LVOT DIAM: 2.21 CM
BH CV ECHO MEAS - LVPWD: 1.22 CM
BH CV ECHO MEAS - MED PEAK E' VEL: 6.3 CM/SEC
BH CV ECHO MEAS - MV A DUR: 0.13 SEC
BH CV ECHO MEAS - MV A MAX VEL: 97.9 CM/SEC
BH CV ECHO MEAS - MV DEC SLOPE: 544.7 CM/SEC2
BH CV ECHO MEAS - MV DEC TIME: 0.1 MSEC
BH CV ECHO MEAS - MV E MAX VEL: 56.9 CM/SEC
BH CV ECHO MEAS - MV E/A: 0.58
BH CV ECHO MEAS - MV MAX PG: 5.3 MMHG
BH CV ECHO MEAS - MV MEAN PG: 2.6 MMHG
BH CV ECHO MEAS - MV P1/2T: 38.3 MSEC
BH CV ECHO MEAS - MV V2 VTI: 18.1 CM
BH CV ECHO MEAS - MVA(P1/2T): 5.7 CM2
BH CV ECHO MEAS - MVA(VTI): 3.8 CM2
BH CV ECHO MEAS - PA ACC TIME: 0.1 SEC
BH CV ECHO MEAS - PA PR(ACCEL): 35 MMHG
BH CV ECHO MEAS - PA V2 MAX: 92.3 CM/SEC
BH CV ECHO MEAS - PULM A REVS DUR: 0.12 SEC
BH CV ECHO MEAS - PULM A REVS VEL: 26.1 CM/SEC
BH CV ECHO MEAS - PULM DIAS VEL: 44.6 CM/SEC
BH CV ECHO MEAS - PULM S/D: 1.49
BH CV ECHO MEAS - PULM SYS VEL: 66.4 CM/SEC
BH CV ECHO MEAS - QP/QS: 0.78
BH CV ECHO MEAS - RAP SYSTOLE: 3 MMHG
BH CV ECHO MEAS - RV MAX PG: 2.7 MMHG
BH CV ECHO MEAS - RV V1 MAX: 81.8 CM/SEC
BH CV ECHO MEAS - RV V1 VTI: 13.9 CM
BH CV ECHO MEAS - RVOT DIAM: 2.22 CM
BH CV ECHO MEAS - RVSP: 27.2 MMHG
BH CV ECHO MEAS - SI(MOD-SP2): 18.1 ML/M2
BH CV ECHO MEAS - SI(MOD-SP4): 20.9 ML/M2
BH CV ECHO MEAS - SUP REN AO DIAM: 1.9 CM
BH CV ECHO MEAS - SV(LVOT): 69.6 ML
BH CV ECHO MEAS - SV(MOD-SP2): 26 ML
BH CV ECHO MEAS - SV(MOD-SP4): 30 ML
BH CV ECHO MEAS - SV(RVOT): 54.1 ML
BH CV ECHO MEAS - TAPSE (>1.6): 2.21 CM
BH CV ECHO MEAS - TR MAX PG: 24.2 MMHG
BH CV ECHO MEAS - TR MAX VEL: 245.8 CM/SEC
BH CV ECHO MEASUREMENTS AVERAGE E/E' RATIO: 7.44
BH CV XLRA - RV BASE: 2.11 CM
BH CV XLRA - RV LENGTH: 7.5 CM
BH CV XLRA - RV MID: 2.19 CM
BH CV XLRA - TDI S': 9.9 CM/SEC
LEFT ATRIUM VOLUME INDEX: 29.8 ML/M2
MAXIMAL PREDICTED HEART RATE: 141 BPM
SINUS: 3.2 CM
STJ: 3.7 CM
STRESS TARGET HR: 120 BPM

## 2023-01-30 PROCEDURE — 93356 MYOCRD STRAIN IMG SPCKL TRCK: CPT | Performed by: INTERNAL MEDICINE

## 2023-01-30 PROCEDURE — 93306 TTE W/DOPPLER COMPLETE: CPT | Performed by: INTERNAL MEDICINE

## 2023-01-30 PROCEDURE — 93356 MYOCRD STRAIN IMG SPCKL TRCK: CPT

## 2023-01-30 PROCEDURE — 93306 TTE W/DOPPLER COMPLETE: CPT

## 2023-01-30 NOTE — TELEPHONE ENCOUNTER
Notified patient of results/recommendations. Patient verbalized understanding.    Marielena Adams RN  Triage [facility] [place]

## 2023-01-30 NOTE — TELEPHONE ENCOUNTER
Please inform patient echo shows her heart remains strong.  Aortic valve stenosis is measured moderate to severe.  Previously this measured mild to moderate.  Because she was feeling so well when she recently saw Dr. Mehta, she needs to just call us if she develops any symptoms such as shortness of breath, chest pain, rapid weight gain, edema or activity intolerance.  Otherwise, we will plan to see her in January next year

## 2023-02-13 RX ORDER — LEVOTHYROXINE SODIUM 88 UG/1
88 TABLET ORAL DAILY
Qty: 90 TABLET | Refills: 1 | Status: SHIPPED | OUTPATIENT
Start: 2023-02-13

## 2023-03-30 DIAGNOSIS — E03.9 ACQUIRED HYPOTHYROIDISM: ICD-10-CM

## 2023-03-30 DIAGNOSIS — E78.2 MIXED HYPERLIPIDEMIA: Primary | ICD-10-CM

## 2023-03-30 DIAGNOSIS — I10 ESSENTIAL HYPERTENSION: ICD-10-CM

## 2023-04-03 LAB
ALBUMIN SERPL-MCNC: 4.4 G/DL (ref 3.5–5.2)
ALBUMIN/GLOB SERPL: 2 G/DL
ALP SERPL-CCNC: 90 U/L (ref 39–117)
ALT SERPL-CCNC: 14 U/L (ref 1–33)
AST SERPL-CCNC: 18 U/L (ref 1–32)
BILIRUB SERPL-MCNC: 0.4 MG/DL (ref 0–1.2)
BUN SERPL-MCNC: 18 MG/DL (ref 8–23)
BUN/CREAT SERPL: 30 (ref 7–25)
CALCIUM SERPL-MCNC: 9.9 MG/DL (ref 8.6–10.5)
CHLORIDE SERPL-SCNC: 105 MMOL/L (ref 98–107)
CHOLEST SERPL-MCNC: 147 MG/DL (ref 0–200)
CO2 SERPL-SCNC: 27.1 MMOL/L (ref 22–29)
CREAT SERPL-MCNC: 0.6 MG/DL (ref 0.57–1)
EGFRCR SERPLBLD CKD-EPI 2021: 91.4 ML/MIN/1.73
GLOBULIN SER CALC-MCNC: 2.2 GM/DL
GLUCOSE SERPL-MCNC: 113 MG/DL (ref 65–99)
HDLC SERPL-MCNC: 72 MG/DL (ref 40–60)
LDLC SERPL CALC-MCNC: 62 MG/DL (ref 0–100)
POTASSIUM SERPL-SCNC: 3.6 MMOL/L (ref 3.5–5.2)
PROT SERPL-MCNC: 6.6 G/DL (ref 6–8.5)
SODIUM SERPL-SCNC: 140 MMOL/L (ref 136–145)
TRIGL SERPL-MCNC: 63 MG/DL (ref 0–150)
TSH SERPL DL<=0.005 MIU/L-ACNC: 1.61 UIU/ML (ref 0.27–4.2)
VLDLC SERPL CALC-MCNC: 13 MG/DL (ref 5–40)

## 2023-04-03 RX ORDER — CARVEDILOL 12.5 MG/1
TABLET ORAL
Qty: 180 TABLET | Refills: 1 | Status: SHIPPED | OUTPATIENT
Start: 2023-04-03

## 2023-04-10 ENCOUNTER — OFFICE VISIT (OUTPATIENT)
Dept: INTERNAL MEDICINE | Facility: CLINIC | Age: 78
End: 2023-04-10
Payer: MEDICARE

## 2023-04-10 VITALS
HEIGHT: 62 IN | DIASTOLIC BLOOD PRESSURE: 58 MMHG | BODY MASS INDEX: 22.63 KG/M2 | SYSTOLIC BLOOD PRESSURE: 152 MMHG | WEIGHT: 123 LBS

## 2023-04-10 DIAGNOSIS — I10 ESSENTIAL HYPERTENSION: Primary | ICD-10-CM

## 2023-04-10 DIAGNOSIS — E53.8 VITAMIN B12 DEFICIENCY: ICD-10-CM

## 2023-04-10 DIAGNOSIS — I25.10 CORONARY ARTERY DISEASE INVOLVING NATIVE HEART WITHOUT ANGINA PECTORIS, UNSPECIFIED VESSEL OR LESION TYPE: ICD-10-CM

## 2023-04-10 DIAGNOSIS — E78.2 MIXED HYPERLIPIDEMIA: ICD-10-CM

## 2023-04-10 LAB
Lab: NORMAL
VIT B12 SERPL-MCNC: 380 PG/ML (ref 211–946)
WRITTEN AUTHORIZATION: NORMAL

## 2023-04-10 PROCEDURE — 3077F SYST BP >= 140 MM HG: CPT | Performed by: INTERNAL MEDICINE

## 2023-04-10 PROCEDURE — 1160F RVW MEDS BY RX/DR IN RCRD: CPT | Performed by: INTERNAL MEDICINE

## 2023-04-10 PROCEDURE — 3078F DIAST BP <80 MM HG: CPT | Performed by: INTERNAL MEDICINE

## 2023-04-10 PROCEDURE — 1159F MED LIST DOCD IN RCRD: CPT | Performed by: INTERNAL MEDICINE

## 2023-04-10 RX ORDER — LANOLIN ALCOHOL/MO/W.PET/CERES
1000 CREAM (GRAM) TOPICAL DAILY
Qty: 90 TABLET | Refills: 4 | Status: SHIPPED | OUTPATIENT
Start: 2023-04-10

## 2023-04-10 RX ORDER — SPIRONOLACTONE 25 MG/1
25 TABLET ORAL DAILY
Qty: 90 TABLET | Refills: 3 | Status: SHIPPED | OUTPATIENT
Start: 2023-04-10

## 2023-04-10 RX ORDER — TRAZODONE HYDROCHLORIDE 50 MG/1
50 TABLET ORAL NIGHTLY
Qty: 30 TABLET | Refills: 5 | Status: SHIPPED | OUTPATIENT
Start: 2023-04-10

## 2023-04-10 NOTE — PROGRESS NOTES
"Chief Complaint   Patient presents with   • Hypertension   • Hyperlipidemia       History of Present Illness   Dee Jorge is a 78 y.o. female presents for follow up evaluation. Patient has htn and hyperlipidemia. Patient reports that she has been \"better\" recently until she just lost a good friend. Patient's sleep has been disrupted since that time. bp has \"been good\" at home. 120s-140s/ 50-70s. She notes that she is tolerating repatha well w ldl c now 62 down from 141. She notes occasional headaches.         The following portions of the patient's history were reviewed and updated as appropriate: allergies, current medications, past family history, past medical history, past social history, past surgical history and problem list.  Current Outpatient Medications on File Prior to Visit   Medication Sig Dispense Refill   • carvedilol (COREG) 12.5 MG tablet TAKE 1 TABLET BY MOUTH TWICE DAILY 180 tablet 1   • Evolocumab (REPATHA) solution auto-injector SureClick injection Inject 1 mL under the skin into the appropriate area as directed Every 14 (Fourteen) Days. 2 mL 5   • fluticasone (FLONASE) 50 MCG/ACT nasal spray SPRAY TWO SPRAYS IN EACH NOSTRIL ONCE DAILY 1 bottle 5   • levalbuterol (Xopenex HFA) 45 MCG/ACT inhaler Inhale 1-2 puffs Every 4 (Four) Hours As Needed for Wheezing. 1 each 5   • levothyroxine (SYNTHROID, LEVOTHROID) 88 MCG tablet TAKE 1 TABLET BY MOUTH DAILY 90 tablet 1   • vitamin B-12 (CYANOCOBALAMIN) 1000 MCG tablet Take 1 tablet by mouth Daily. 90 tablet 4     No current facility-administered medications on file prior to visit.     Review of Systems   Constitutional: Negative.    Eyes: Negative.    Respiratory: Negative.    Cardiovascular: Negative.    Gastrointestinal: Negative.    Endocrine: Negative.    Genitourinary: Negative.    Musculoskeletal: Negative.    Allergic/Immunologic: Negative.    Neurological: Positive for headaches.   Hematological: Negative.    Psychiatric/Behavioral: Negative.  " "      Objective   Physical Exam  Vitals and nursing note reviewed.   Constitutional:       Appearance: Normal appearance.   HENT:      Head: Normocephalic and atraumatic.      Right Ear: Tympanic membrane normal.      Left Ear: Tympanic membrane normal.      Nose: Nose normal.      Mouth/Throat:      Mouth: Mucous membranes are moist.   Eyes:      Extraocular Movements: Extraocular movements intact.      Pupils: Pupils are equal, round, and reactive to light.   Cardiovascular:      Rate and Rhythm: Normal rate and regular rhythm.      Pulses: Normal pulses.      Heart sounds: Normal heart sounds.   Pulmonary:      Effort: Pulmonary effort is normal.      Breath sounds: Normal breath sounds.   Abdominal:      General: Abdomen is flat.      Palpations: Abdomen is soft.   Musculoskeletal:         General: Normal range of motion.      Cervical back: Normal range of motion.   Skin:     General: Skin is warm and dry.   Neurological:      General: No focal deficit present.      Mental Status: She is alert and oriented to person, place, and time.   Psychiatric:         Mood and Affect: Mood normal.         Behavior: Behavior normal.         Thought Content: Thought content normal.         Judgment: Judgment normal.          /58   Ht 157.5 cm (62\")   Wt 55.8 kg (123 lb)   LMP  (LMP Unknown)   BMI 22.50 kg/m²     Assessment & Plan   Diagnoses and all orders for this visit:    Essential hypertension    Mixed hyperlipidemia    Coronary artery disease involving native heart without angina pectoris, unspecified vessel or lesion type    Other orders  -     traZODone (DESYREL) 50 MG tablet; Take 1 tablet by mouth Every Night.  -     spironolactone (Aldactone) 25 MG tablet; Take 1 tablet by mouth Daily.      Patient w/ hypertension, hyperlip, and cad. Will add spironolactone given cystic acne and elevated blood pressure. Patient may try trazodone for sleep 1/2-1 tablet. She will continue carvedilol and may take additional " tab if bp running >140. She will hydrate. To monitor bp if she has a headache and reduce caffeine. She is to call if having headaches.

## 2023-04-25 DIAGNOSIS — E78.2 MIXED HYPERLIPIDEMIA: ICD-10-CM

## 2023-04-25 DIAGNOSIS — I25.83 CORONARY ARTERY DISEASE DUE TO LIPID RICH PLAQUE: ICD-10-CM

## 2023-04-25 DIAGNOSIS — I25.10 CORONARY ARTERY DISEASE DUE TO LIPID RICH PLAQUE: ICD-10-CM

## 2023-04-25 RX ORDER — EVOLOCUMAB 140 MG/ML
INJECTION, SOLUTION SUBCUTANEOUS
Qty: 2 ML | Refills: 5 | Status: SHIPPED | OUTPATIENT
Start: 2023-04-25

## 2023-08-14 RX ORDER — LEVOTHYROXINE SODIUM 88 UG/1
88 TABLET ORAL DAILY
Qty: 90 TABLET | Refills: 1 | Status: SHIPPED | OUTPATIENT
Start: 2023-08-14

## 2023-08-24 ENCOUNTER — TELEPHONE (OUTPATIENT)
Dept: INTERNAL MEDICINE | Facility: CLINIC | Age: 78
End: 2023-08-24
Payer: MEDICARE

## 2023-08-24 NOTE — TELEPHONE ENCOUNTER
Pt had a question about why her repatha price went up  Informed her to call her insurance company

## 2023-08-24 NOTE — TELEPHONE ENCOUNTER
Caller: Dee Jorge    Relationship: Self    Best call back number: [phone]     Who are you requesting to speak with (clinical staff, provider,  specific staff member): CLINICAL STAFF     What was the call regarding: HAS QUESTIONS ABOUT THE Repatha SureClick solution auto-injector SureClick injection  COST AT [facility]

## 2023-11-09 DIAGNOSIS — E03.9 ACQUIRED HYPOTHYROIDISM: ICD-10-CM

## 2023-11-09 DIAGNOSIS — I10 ESSENTIAL HYPERTENSION: ICD-10-CM

## 2023-11-09 DIAGNOSIS — E78.2 MIXED HYPERLIPIDEMIA: Primary | ICD-10-CM

## 2023-11-14 RX ORDER — CARVEDILOL 12.5 MG/1
TABLET ORAL
Qty: 180 TABLET | Refills: 1 | Status: SHIPPED | OUTPATIENT
Start: 2023-11-14

## 2023-11-15 LAB
ALBUMIN SERPL-MCNC: 4.6 G/DL (ref 3.5–5.2)
ALBUMIN/GLOB SERPL: 2.6 G/DL
ALP SERPL-CCNC: 83 U/L (ref 39–117)
ALT SERPL-CCNC: 12 U/L (ref 1–33)
AST SERPL-CCNC: 20 U/L (ref 1–32)
BASOPHILS # BLD AUTO: 0.06 10*3/MM3 (ref 0–0.2)
BASOPHILS NFR BLD AUTO: 0.6 % (ref 0–1.5)
BILIRUB SERPL-MCNC: 0.4 MG/DL (ref 0–1.2)
BUN SERPL-MCNC: 21 MG/DL (ref 8–23)
BUN/CREAT SERPL: 35.6 (ref 7–25)
CALCIUM SERPL-MCNC: 9.8 MG/DL (ref 8.6–10.5)
CHLORIDE SERPL-SCNC: 106 MMOL/L (ref 98–107)
CHOLEST SERPL-MCNC: 195 MG/DL (ref 0–200)
CO2 SERPL-SCNC: 25.7 MMOL/L (ref 22–29)
CREAT SERPL-MCNC: 0.59 MG/DL (ref 0.57–1)
EGFRCR SERPLBLD CKD-EPI 2021: 91.8 ML/MIN/1.73
EOSINOPHIL # BLD AUTO: 0.26 10*3/MM3 (ref 0–0.4)
EOSINOPHIL NFR BLD AUTO: 2.7 % (ref 0.3–6.2)
ERYTHROCYTE [DISTWIDTH] IN BLOOD BY AUTOMATED COUNT: 12.7 % (ref 12.3–15.4)
GLOBULIN SER CALC-MCNC: 1.8 GM/DL
GLUCOSE SERPL-MCNC: 103 MG/DL (ref 65–99)
HCT VFR BLD AUTO: 32.8 % (ref 34–46.6)
HDLC SERPL-MCNC: 59 MG/DL (ref 40–60)
HGB BLD-MCNC: 10.9 G/DL (ref 12–15.9)
IMM GRANULOCYTES # BLD AUTO: 0.02 10*3/MM3 (ref 0–0.05)
IMM GRANULOCYTES NFR BLD AUTO: 0.2 % (ref 0–0.5)
LDLC SERPL CALC-MCNC: 117 MG/DL (ref 0–100)
LYMPHOCYTES # BLD AUTO: 1.63 10*3/MM3 (ref 0.7–3.1)
LYMPHOCYTES NFR BLD AUTO: 17.1 % (ref 19.6–45.3)
MCH RBC QN AUTO: 29.4 PG (ref 26.6–33)
MCHC RBC AUTO-ENTMCNC: 33.2 G/DL (ref 31.5–35.7)
MCV RBC AUTO: 88.4 FL (ref 79–97)
MONOCYTES # BLD AUTO: 0.61 10*3/MM3 (ref 0.1–0.9)
MONOCYTES NFR BLD AUTO: 6.4 % (ref 5–12)
NEUTROPHILS # BLD AUTO: 6.95 10*3/MM3 (ref 1.7–7)
NEUTROPHILS NFR BLD AUTO: 73 % (ref 42.7–76)
NRBC BLD AUTO-RTO: 0 /100 WBC (ref 0–0.2)
PLATELET # BLD AUTO: 384 10*3/MM3 (ref 140–450)
POTASSIUM SERPL-SCNC: 4 MMOL/L (ref 3.5–5.2)
PROT SERPL-MCNC: 6.4 G/DL (ref 6–8.5)
RBC # BLD AUTO: 3.71 10*6/MM3 (ref 3.77–5.28)
SODIUM SERPL-SCNC: 141 MMOL/L (ref 136–145)
TRIGL SERPL-MCNC: 106 MG/DL (ref 0–150)
TSH SERPL DL<=0.005 MIU/L-ACNC: 1.24 UIU/ML (ref 0.27–4.2)
UNABLE TO VOID: NORMAL
VLDLC SERPL CALC-MCNC: 19 MG/DL (ref 5–40)
WBC # BLD AUTO: 9.53 10*3/MM3 (ref 3.4–10.8)

## 2023-11-20 ENCOUNTER — OFFICE VISIT (OUTPATIENT)
Dept: INTERNAL MEDICINE | Facility: CLINIC | Age: 78
End: 2023-11-20
Payer: MEDICARE

## 2023-11-20 VITALS
BODY MASS INDEX: 22.31 KG/M2 | DIASTOLIC BLOOD PRESSURE: 80 MMHG | OXYGEN SATURATION: 98 % | SYSTOLIC BLOOD PRESSURE: 150 MMHG | WEIGHT: 122 LBS | HEART RATE: 87 BPM

## 2023-11-20 DIAGNOSIS — I10 ESSENTIAL HYPERTENSION: ICD-10-CM

## 2023-11-20 DIAGNOSIS — I25.10 CORONARY ARTERY DISEASE INVOLVING NATIVE HEART WITHOUT ANGINA PECTORIS, UNSPECIFIED VESSEL OR LESION TYPE: ICD-10-CM

## 2023-11-20 DIAGNOSIS — Z00.00 HEALTHCARE MAINTENANCE: Primary | ICD-10-CM

## 2023-11-20 DIAGNOSIS — D50.9 IRON DEFICIENCY ANEMIA, UNSPECIFIED IRON DEFICIENCY ANEMIA TYPE: ICD-10-CM

## 2023-11-20 DIAGNOSIS — E03.9 ACQUIRED HYPOTHYROIDISM: ICD-10-CM

## 2023-11-20 DIAGNOSIS — M81.0 AGE-RELATED OSTEOPOROSIS WITHOUT CURRENT PATHOLOGICAL FRACTURE: ICD-10-CM

## 2023-11-20 DIAGNOSIS — E78.2 MIXED HYPERLIPIDEMIA: ICD-10-CM

## 2023-11-20 DIAGNOSIS — Z78.0 MENOPAUSE: ICD-10-CM

## 2023-11-20 DIAGNOSIS — Z23 NEED FOR VACCINATION: ICD-10-CM

## 2023-11-20 RX ORDER — ASPIRIN 81 MG/1
81 TABLET ORAL DAILY
Qty: 100 TABLET | Refills: 3 | Status: SHIPPED | OUTPATIENT
Start: 2023-11-20

## 2023-11-20 NOTE — PROGRESS NOTES
The ABCs of the Annual Wellness Visit  Subsequent Medicare Wellness Visit    Subjective    Dee Jorge is a 78 y.o. female who presents for a Subsequent Medicare Wellness Visit.    The following portions of the patient's history were reviewed and   updated as appropriate: allergies, current medications, past family history, past medical history, past social history, past surgical history, and problem list.    Compared to one year ago, the patient feels her physical   health is the same.    Compared to one year ago, the patient feels her mental   health is the same.    Recent Hospitalizations:  She was not admitted within the past 365 days.       Current Medical Providers:  Patient Care Team:  Kacie Loving MD as PCP - General  Kacie Loving MD as PCP - Family Medicine  Jasmin Mehta MD as Consulting Physician (Cardiology)    Outpatient Medications Prior to Visit   Medication Sig Dispense Refill    azithromycin (ZITHROMAX) 250 MG tablet Take 2 tablets the first day, then 1 tablet daily for 4 days. 6 tablet 0    carvedilol (COREG) 12.5 MG tablet TAKE 1 TABLET BY MOUTH TWICE DAILY 180 tablet 1    fluticasone (FLONASE) 50 MCG/ACT nasal spray SPRAY TWO SPRAYS IN EACH NOSTRIL ONCE DAILY 1 bottle 5    levalbuterol (Xopenex HFA) 45 MCG/ACT inhaler Inhale 1-2 puffs Every 4 (Four) Hours As Needed for Wheezing. 1 each 5    levothyroxine (SYNTHROID, LEVOTHROID) 88 MCG tablet TAKE 1 TABLET BY MOUTH DAILY 90 tablet 1    Repatha SureClick solution auto-injector SureClick injection ADMINISTER 1 ML UNDER THE SKIN EVERY 14 DAYS AS DIRECTED 2 mL 5    spironolactone (Aldactone) 25 MG tablet Take 1 tablet by mouth Daily. 90 tablet 3    traZODone (DESYREL) 50 MG tablet Take 1 tablet by mouth Every Night. 30 tablet 5    vitamin B-12 (CYANOCOBALAMIN) 1000 MCG tablet Take 1 tablet by mouth Daily. 90 tablet 4     No facility-administered medications prior to visit.       No opioid medication identified on active medication list. I  "have reviewed chart for other potential  high risk medication/s and harmful drug interactions in the elderly.        Aspirin is not on active medication list.  Aspirin use is indicated based on review of current medical condition/s. Pros and cons of this therapy have been discussed with this patient. Benefits of this medication outweigh potential harm.  Patient has been instructed to start taking this medication..    Patient Active Problem List   Diagnosis    Anxiety    Mixed hyperlipidemia    Essential hypertension    Hypothyroidism    Pain in shoulder    Leg edema, left    Foot trauma    Age-related osteoporosis without current pathological fracture    Hearing loss    High coronary artery calcium score    Occult blood positive stool    CAD (coronary artery disease)     Advance Care Planning   Advance Care Planning     Advance Directive is not on file.  ACP discussion was held with the patient during this visit. Patient has an advance directive (not in EMR), copy requested.     Objective    Vitals:    11/20/23 0811   BP: 150/80   Pulse: 87   SpO2: 98%   Weight: 55.3 kg (122 lb)     Estimated body mass index is 22.31 kg/m² as calculated from the following:    Height as of 5/30/23: 157.5 cm (62\").    Weight as of this encounter: 55.3 kg (122 lb).    BMI is within normal parameters. No other follow-up for BMI required.      Does the patient have evidence of cognitive impairment? No    Lab Results   Component Value Date    CHLPL 195 11/15/2023    TRIG 106 11/15/2023    HDL 59 11/15/2023     (H) 11/15/2023    VLDL 19 11/15/2023        HEALTH RISK ASSESSMENT    Smoking Status:  Social History     Tobacco Use   Smoking Status Never   Smokeless Tobacco Never     Alcohol Consumption:  Social History     Substance and Sexual Activity   Alcohol Use No    Comment: daily caffiene     Fall Risk Screen:    STEADI Fall Risk Assessment was completed, and patient is at LOW risk for falls.Assessment completed " on:2023    Depression Screenin/20/2023     8:10 AM   PHQ-2/PHQ-9 Depression Screening   Little Interest or Pleasure in Doing Things 0-->not at all   Feeling Down, Depressed or Hopeless 0-->not at all   PHQ-9: Brief Depression Severity Measure Score 0       Health Habits and Functional and Cognitive Screenin/20/2023     8:10 AM   Functional & Cognitive Status   Do you have difficulty preparing food and eating? No   Do you have difficulty bathing yourself, getting dressed or grooming yourself? No   Do you have difficulty using the toilet? No   Do you have difficulty moving around from place to place? No   Do you have trouble with steps or getting out of a bed or a chair? No   Current Diet Well Balanced Diet   Dental Exam Up to date   Eye Exam Up to date   Do you need help using the phone?  No   Are you deaf or do you have serious difficulty hearing?  No   Do you need help to go to places out of walking distance? No   Do you need help shopping? No   Do you need help preparing meals?  No   Do you need help with housework?  No   Do you need help with laundry? No   Do you need help taking your medications? No   Do you need help managing money? No   Do you ever drive or ride in a car without wearing a seat belt? No   Have you felt unusual stress, anger or loneliness in the last month? No   Who do you live with? Spouse   If you need help, do you have trouble finding someone available to you? No   Have you been bothered in the last four weeks by sexual problems? No   Do you have difficulty concentrating, remembering or making decisions? No       Age-appropriate Screening Schedule:  Refer to the list below for future screening recommendations based on patient's age, sex and/or medical conditions. Orders for these recommended tests are listed in the plan section. The patient has been provided with a written plan.    Health Maintenance   Topic Date Due    ZOSTER VACCINE (2 of 3) 2008    DXA SCAN   08/21/2019    INFLUENZA VACCINE  08/01/2023    COVID-19 Vaccine (4 - 2023-24 season) 09/01/2023    TDAP/TD VACCINES (2 - Tdap) 07/15/2024    LIPID PANEL  11/15/2024    ANNUAL WELLNESS VISIT  11/20/2024    COLORECTAL CANCER SCREENING  01/22/2030    HEPATITIS C SCREENING  Completed    Pneumococcal Vaccine 65+  Completed                  CMS Preventative Services Quick Reference  Risk Factors Identified During Encounter  Immunizations Discussed/Encouraged: Influenza, Shingrix, and RSV (Respiratory Syncytial Virus)  The above risks/problems have been discussed with the patient.  Pertinent information has been shared with the patient in the After Visit Summary.  An After Visit Summary and PPPS were made available to the patient.    Follow Up:   Next Medicare Wellness visit to be scheduled in 1 year.       Additional E&M Note during same encounter follows:  Patient has multiple medical problems which are significant and separately identifiable that require additional work above and beyond the Medicare Wellness Visit.      Chief Complaint  Annual Exam  Htn  Cad  Hyperlip  Anemia  Hypothyroid.   Aortic stenosis    Subjective        HPI  Dee Jorge is also being seen today for AWV,  hypothyroid, htn, cad, hypothyroid, hyperlip. Patient is doing well today. She notes that she has not had repatha recently related to increase in cost. Previously $49 now $130. She has had increase ldl from  off the med.   Patient is noted to be anemic. Previously had benefit w/ b12 supplementation and she does contnue on this. Has been rx iron replacement in the past and notes some gi intolerance to this. She does not have any signs of gi bleeding. Normal cscope 2020.   Thyroid is euthyroid on supplemenation.   No recent cp. 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 She notes she can get soa at end of 2 miles. She does have aortic stenosis that is moderate to severe in nature.      Review of Systems   Constitutional: Negative.    HENT: Negative.     Eyes: Negative.    Respiratory: Negative.     Cardiovascular: Negative.    Gastrointestinal: Negative.    Endocrine: Negative.    Genitourinary: Negative.    Musculoskeletal: Negative.    Allergic/Immunologic: Negative.    Neurological: Negative.    Hematological: Negative.    Psychiatric/Behavioral: Negative.         Objective   Vital Signs:  /80   Pulse 87   Wt 55.3 kg (122 lb)   SpO2 98%   BMI 22.31 kg/m²     Physical Exam  Vitals and nursing note reviewed.   Constitutional:       Appearance: Normal appearance. She is well-developed.   HENT:      Head: Normocephalic and atraumatic.      Right Ear: Tympanic membrane and external ear normal.      Left Ear: Tympanic membrane and external ear normal.      Nose: Nose normal.      Mouth/Throat:      Mouth: Mucous membranes are moist.   Eyes:      Extraocular Movements: Extraocular movements intact.      Pupils: Pupils are equal, round, and reactive to light.   Cardiovascular:      Rate and Rhythm: Normal rate and regular rhythm.      Pulses: Normal pulses.      Heart sounds: Normal heart sounds.   Pulmonary:      Effort: Pulmonary effort is normal. No respiratory distress.      Breath sounds: Normal breath sounds.   Abdominal:      General: Abdomen is flat. Bowel sounds are normal.      Palpations: Abdomen is soft.   Genitourinary:     General: Normal vulva.      Rectum: Normal.   Musculoskeletal:         General: Normal range of motion.      Cervical back: Normal range of motion and neck supple.   Skin:     General: Skin is warm and dry.   Neurological:      General: No focal deficit present.      Mental Status: She is alert and oriented to person, place, and time.   Psychiatric:         Mood and Affect: Mood normal.         Behavior: Behavior normal.         Thought Content: Thought content normal.          The following data was reviewed by: Kacie Loving MD on 11/20/2023:  [facility]           4/3/2023    09:20 11/15/2023    08:54   CMP   Glucose 113  103    BUN 18  21    Creatinine 0.60  0.59    Sodium 140  141    Potassium 3.6  4.0    Chloride 105  106    Calcium 9.9  9.8    Total Protein 6.6  6.4    Albumin 4.4  4.6    Globulin 2.2  1.8    Total Bilirubin 0.4  0.4    Alkaline Phosphatase 90  83    AST (SGOT) 18  20    ALT (SGPT) 14  12    BUN/Creatinine Ratio 30.0  35.6      CBC w/diff          11/15/2023    08:54   CBC w/Diff   WBC 9.53    RBC 3.71    Hemoglobin 10.9    Hematocrit 32.8    MCV 88.4    MCH 29.4    MCHC 33.2    RDW 12.7    Platelets 384    Neutrophil Rel % 73.0    Lymphocyte Rel % 17.1    Monocyte Rel % 6.4    Eosinophil Rel % 2.7    Basophil Rel % 0.6      Lipid Panel          4/3/2023    09:20 11/15/2023    08:54   Lipid Panel   Total Cholesterol 147  195    Triglycerides 63  106    HDL Cholesterol 72  59    VLDL Cholesterol 13  19    LDL Cholesterol  62  117      TSH          4/3/2023    09:20 11/15/2023    08:54   TSH   TSH 1.610  1.240        Data reviewed : Cardiology studies office visit           Assessment and Plan   Diagnoses and all orders for this visit:    1. Healthcare maintenance (Primary)    2. Age-related osteoporosis without current pathological fracture  -     DEXA Bone Density Axial    3. Menopause  -     DEXA Bone Density Axial    4. Need for vaccination  -     Fluzone High-Dose 65+yrs (6470-3799)    5. Coronary artery disease involving native heart without angina pectoris, unspecified vessel or lesion type    6. Essential hypertension    7. Mixed hyperlipidemia    8. Acquired hypothyroidism    9. Iron deficiency anemia, unspecified iron deficiency anemia type  -     CBC & Differential  -     Basic Metabolic Panel  -     Ferritin  -     Iron Profile  -     Vitamin B12    Other orders  -     aspirin 81 MG EC tablet; Take 1 tablet by mouth Daily.  Dispense: 100 tablet; Refill: 3      -cad- will take asa daily. Continue repatha. Gave samples today. She will  check her drug formulary. Gave patient forms for patient assistance program for the medication. Will monitor lipid. Continue healthy nutrtion and movemetn.   -hyperlip-above  -htn=low sodium nutrtion. She has been off of spironolactone and will restart this med.   -anemia-will test iron, b12 and folate levels. Supplement if indicated.   -hypothyroid-continue current levothyroxine  -aortic stenosis- she will f/u w/ cardiology routinely to determine best timing on valve sx if needed.        I spent 55 minutes caring for Dee on this date of service. This time includes time spent by me in the following activities:preparing for the visit, reviewing tests, obtaining and/or reviewing a separately obtained history, performing a medically appropriate examination and/or evaluation , counseling and educating the patient/family/caregiver, ordering medications, tests, or procedures, referring and communicating with other health care professionals , documenting information in the medical record, independently interpreting results and communicating that information with the patient/family/caregiver, and care coordination  Follow Up   No follow-ups on file.  Patient was given instructions and counseling regarding her condition or for health maintenance advice. Please see specific information pulled into the AVS if appropriate.

## 2023-12-04 LAB
BASOPHILS # BLD AUTO: 0.07 10*3/MM3 (ref 0–0.2)
BASOPHILS NFR BLD AUTO: 1 % (ref 0–1.5)
BUN SERPL-MCNC: 14 MG/DL (ref 8–23)
BUN/CREAT SERPL: 17.9 (ref 7–25)
CALCIUM SERPL-MCNC: 9.9 MG/DL (ref 8.6–10.5)
CHLORIDE SERPL-SCNC: 105 MMOL/L (ref 98–107)
CO2 SERPL-SCNC: 24.4 MMOL/L (ref 22–29)
CREAT SERPL-MCNC: 0.78 MG/DL (ref 0.57–1)
EGFRCR SERPLBLD CKD-EPI 2021: 77.4 ML/MIN/1.73
EOSINOPHIL # BLD AUTO: 0.28 10*3/MM3 (ref 0–0.4)
EOSINOPHIL NFR BLD AUTO: 4.2 % (ref 0.3–6.2)
ERYTHROCYTE [DISTWIDTH] IN BLOOD BY AUTOMATED COUNT: 13 % (ref 12.3–15.4)
FERRITIN SERPL-MCNC: 29.8 NG/ML (ref 13–150)
GLUCOSE SERPL-MCNC: 101 MG/DL (ref 65–99)
HCT VFR BLD AUTO: 34.2 % (ref 34–46.6)
HGB BLD-MCNC: 11.4 G/DL (ref 12–15.9)
IMM GRANULOCYTES # BLD AUTO: 0.01 10*3/MM3 (ref 0–0.05)
IMM GRANULOCYTES NFR BLD AUTO: 0.1 % (ref 0–0.5)
IRON SATN MFR SERPL: 16 % (ref 20–50)
IRON SERPL-MCNC: 78 MCG/DL (ref 37–145)
LYMPHOCYTES # BLD AUTO: 1.42 10*3/MM3 (ref 0.7–3.1)
LYMPHOCYTES NFR BLD AUTO: 21.3 % (ref 19.6–45.3)
MCH RBC QN AUTO: 29.4 PG (ref 26.6–33)
MCHC RBC AUTO-ENTMCNC: 33.3 G/DL (ref 31.5–35.7)
MCV RBC AUTO: 88.1 FL (ref 79–97)
MONOCYTES # BLD AUTO: 0.47 10*3/MM3 (ref 0.1–0.9)
MONOCYTES NFR BLD AUTO: 7 % (ref 5–12)
NEUTROPHILS # BLD AUTO: 4.42 10*3/MM3 (ref 1.7–7)
NEUTROPHILS NFR BLD AUTO: 66.4 % (ref 42.7–76)
NRBC BLD AUTO-RTO: 0 /100 WBC (ref 0–0.2)
PLATELET # BLD AUTO: 380 10*3/MM3 (ref 140–450)
POTASSIUM SERPL-SCNC: 4.1 MMOL/L (ref 3.5–5.2)
RBC # BLD AUTO: 3.88 10*6/MM3 (ref 3.77–5.28)
SODIUM SERPL-SCNC: 139 MMOL/L (ref 136–145)
TIBC SERPL-MCNC: 474 MCG/DL
UIBC SERPL-MCNC: 396 MCG/DL (ref 112–346)
VIT B12 SERPL-MCNC: 416 PG/ML (ref 211–946)
WBC # BLD AUTO: 6.67 10*3/MM3 (ref 3.4–10.8)

## 2023-12-05 ENCOUNTER — TELEPHONE (OUTPATIENT)
Dept: INTERNAL MEDICINE | Facility: CLINIC | Age: 78
End: 2023-12-05
Payer: MEDICARE

## 2023-12-05 NOTE — TELEPHONE ENCOUNTER
----- Message from Kacie Loving MD sent at 12/5/2023  2:10 PM EST -----  Mild iron def anemia w/ improving blood counts. Continue iron rich nutrition. Will erpeat prior to next visit in may  jw

## 2024-01-18 ENCOUNTER — OFFICE VISIT (OUTPATIENT)
Dept: CARDIOLOGY | Facility: CLINIC | Age: 79
End: 2024-01-18
Payer: MEDICARE

## 2024-01-18 VITALS
OXYGEN SATURATION: 98 % | BODY MASS INDEX: 22.08 KG/M2 | WEIGHT: 120 LBS | DIASTOLIC BLOOD PRESSURE: 80 MMHG | SYSTOLIC BLOOD PRESSURE: 146 MMHG | HEART RATE: 72 BPM | RESPIRATION RATE: 16 BRPM | HEIGHT: 62 IN

## 2024-01-18 DIAGNOSIS — I10 ESSENTIAL HYPERTENSION: ICD-10-CM

## 2024-01-18 DIAGNOSIS — I35.0 NONRHEUMATIC AORTIC VALVE STENOSIS: ICD-10-CM

## 2024-01-18 DIAGNOSIS — R93.1 HIGH CORONARY ARTERY CALCIUM SCORE: Primary | ICD-10-CM

## 2024-01-18 DIAGNOSIS — I35.1 NONRHEUMATIC AORTIC VALVE INSUFFICIENCY: ICD-10-CM

## 2024-01-18 PROCEDURE — 93000 ELECTROCARDIOGRAM COMPLETE: CPT | Performed by: INTERNAL MEDICINE

## 2024-01-18 PROCEDURE — 99214 OFFICE O/P EST MOD 30 MIN: CPT | Performed by: INTERNAL MEDICINE

## 2024-01-18 PROCEDURE — 1159F MED LIST DOCD IN RCRD: CPT | Performed by: INTERNAL MEDICINE

## 2024-01-18 PROCEDURE — 3079F DIAST BP 80-89 MM HG: CPT | Performed by: INTERNAL MEDICINE

## 2024-01-18 PROCEDURE — 3077F SYST BP >= 140 MM HG: CPT | Performed by: INTERNAL MEDICINE

## 2024-01-18 PROCEDURE — 1160F RVW MEDS BY RX/DR IN RCRD: CPT | Performed by: INTERNAL MEDICINE

## 2024-01-18 NOTE — PROGRESS NOTES
CARDIOLOGY    Jasmin Mehta MD    ENCOUNTER DATE:  01/18/2024    Dee Jorge / 78 y.o. / female        CHIEF COMPLAINT / REASON FOR OFFICE VISIT     Heart Problem (Yearly follow up/Coronary artery disease, heart murmur )      HISTORY OF PRESENT ILLNESS       HPI    Dee Jorge is a 78 y.o. female     This is a woman whom I saw in the remote past and then came back to see me 9/27/18. She has a history of hypertension and hyperlipidemia but is statin intolerant. She says she has tried about 5 different statins and cannot tolerate them. She had an echocardiogram in 08/2018 which showed mild asymmetric septal hypertrophy, normal LV systolic function with an ejection fraction of 59%, grade I diastolic dysfunction, calcified aortic valve with mild-to-moderate aortic regurgitation. She had a calcium score of 404 on 9/12/18.     Echocardiogram January 2023 showed normal LV systolic function.  Ejection fraction 65%, grade 1 diastolic dysfunction, moderate aortic regurgitation, moderate to severe aortic stenosis with a peak velocity of 3.6 m/s and a mean gradient of 34 mmHg.  Right ventricular systolic pressure was normal.    She comes in today for routine follow-up.  She was sick and had a cough but that is improving.  She feels like she has some shortness of breath with exertion.  She is not doing any regular exercise.  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  She oh walks around the house but otherwise is not particularly active.  She was on Repatha and her cholesterol levels were doing really well however, the price got too expensive so she stopped taking it.  She is good to look back into resuming it.    REVIEW OF SYSTEMS     Review of Systems   Constitutional: Negative for chills, fever, weight gain and weight loss.   Cardiovascular:  Negative for leg swelling.   Respiratory:  Negative for cough, snoring and wheezing.    Hematologic/Lymphatic: Negative for bleeding problem. Does not  "bruise/bleed easily.   Skin:  Negative for color change.   Musculoskeletal:  Positive for joint pain. Negative for falls and myalgias.   Gastrointestinal:  Negative for melena.   Genitourinary:  Negative for hematuria.   Neurological:  Negative for excessive daytime sleepiness.   Psychiatric/Behavioral:  Negative for depression. The patient is not nervous/anxious.          VITAL SIGNS     Visit Vitals  /80 (BP Location: Right arm, Patient Position: Sitting, Cuff Size: Adult)   Pulse 72   Resp 16   Ht 157.5 cm (62\")   Wt 54.4 kg (120 lb)   LMP  (LMP Unknown)   SpO2 98%   BMI 21.95 kg/m²         Wt Readings from Last 3 Encounters:   01/18/24 54.4 kg (120 lb)   11/20/23 55.3 kg (122 lb)   05/30/23 54.4 kg (120 lb)     Body mass index is 21.95 kg/m².      PHYSICAL EXAMINATION     Constitutional:       General: Not in acute distress.  Neck:      Vascular: No carotid bruit or JVD.   Pulmonary:      Effort: Pulmonary effort is normal.      Breath sounds: Normal breath sounds.   Cardiovascular:      Normal rate. Regular rhythm.      Murmurs: There is a grade 3/6 systolic murmur at the URSB.   Psychiatric:         Mood and Affect: Mood and affect normal.           REVIEWED DATA       ECG 12 Lead    Date/Time: 1/18/2024 10:33 AM  Performed by: Jasmin Mehta MD    Authorized by: Jasmin Mehta MD  Comparison: compared with previous ECG from 1/12/2023  Rhythm: sinus rhythm  BPM: 72  Conduction: conduction normal  ST Segments: ST segments normal  T Waves: T waves normal    Clinical impression: normal ECG            Lipid Panel          4/3/2023    09:20 11/15/2023    08:54   Lipid Panel   Total Cholesterol 147  195    Triglycerides 63  106    HDL Cholesterol 72  59    VLDL Cholesterol 13  19    LDL Cholesterol  62  117        Lab Results   Component Value Date    GLUCOSE 101 (H) 12/04/2023    BUN 14 12/04/2023    CREATININE 0.78 12/04/2023    EGFRRESULT 77.4 12/04/2023    EGFR >60 01/06/2014    BCR 17.9 12/04/2023 "    K 4.1 12/04/2023    CO2 24.4 12/04/2023    CALCIUM 9.9 12/04/2023    PROTENTOTREF 6.4 11/15/2023    ALBUMIN 4.6 11/15/2023    BILITOT 0.4 11/15/2023    AST 20 11/15/2023    ALT 12 11/15/2023       ASSESSMENT & PLAN      Diagnosis Plan   1. High coronary artery calcium score  Adult Transthoracic Echo Complete W/ Cont if Necessary Per Protocol      2. Essential hypertension  Adult Transthoracic Echo Complete W/ Cont if Necessary Per Protocol      3. Nonrheumatic aortic valve insufficiency  Adult Transthoracic Echo Complete W/ Cont if Necessary Per Protocol      4. Nonrheumatic aortic valve stenosis  Adult Transthoracic Echo Complete W/ Cont if Necessary Per Protocol          1.  Coronary artery disease/coronary calcification.  No anginal symptoms.  She does not tolerate statins.  Zetia caused diarrhea.  She did fairly well on Repatha though she may have had a headache with it.  I encouraged her to try to get back on it.  She had to discontinue it because of the cost.  2.  Hypertension.  Blood pressure is controlled at home.  She says it is always high when she comes to the doctor but at home it is normal.  3.  Hyperlipidemia.  As above.  Not tolerated multiple medications.  Encouraged her to resume Repatha  4.  Moderate aortic regurgitation with moderate to severe aortic stenosis.  I reviewed her echo images today.  She does have a murmur consistent with aortic stenosis.  She is asymptomatic.  Plan is for her to have an echo when she comes in for her follow-up appointment next year.     Follow-up with Marcia next year with an echocardiogram.    Orders Placed This Encounter   Procedures    ECG 12 Lead     This order was created via procedure documentation     Order Specific Question:   Release to patient     Answer:   Routine Release [[id number]]    Adult Transthoracic Echo Complete W/ Cont if Necessary Per Protocol     Standing Status:   Future     Order Specific Question:   Reason for exam?     Answer:   Murmur or  Click     Order Specific Question:   Reason for exam?     Answer:   Valvular Function     Order Specific Question:   Release to patient     Answer:   Routine Release [[id number]]           MEDICATIONS         Discharge Medications            Accurate as of January 18, 2024 10:35 AM. If you have any questions, ask your nurse or doctor.                Continue These Medications        Instructions Start Date   aspirin 81 MG EC tablet   81 mg, Oral, Daily      carvedilol 12.5 MG tablet  Commonly known as: COREG   TAKE 1 TABLET BY MOUTH TWICE DAILY      fluticasone 50 MCG/ACT nasal spray  Commonly known as: FLONASE   SPRAY TWO SPRAYS IN EACH NOSTRIL ONCE DAILY      levalbuterol 45 MCG/ACT inhaler  Commonly known as: Xopenex HFA   1-2 puffs, Inhalation, Every 4 Hours PRN      levothyroxine 88 MCG tablet  Commonly known as: SYNTHROID, LEVOTHROID   88 mcg, Oral, Daily      vitamin B-12 1000 MCG tablet  Commonly known as: CYANOCOBALAMIN   1,000 mcg, Oral, Daily                 Jasmin Mehta MD  01/18/24  10:35 EST    Part of this note may be an electronic transcription/translation of spoken language to printed text using the Dragon dictation system.

## 2024-02-13 RX ORDER — LEVOTHYROXINE SODIUM 88 UG/1
88 TABLET ORAL DAILY
Qty: 90 TABLET | Refills: 1 | Status: SHIPPED | OUTPATIENT
Start: 2024-02-13

## 2024-03-13 ENCOUNTER — TELEPHONE (OUTPATIENT)
Dept: INTERNAL MEDICINE | Facility: CLINIC | Age: 79
End: 2024-03-13
Payer: MEDICARE

## 2024-03-13 NOTE — TELEPHONE ENCOUNTER
Patient called into office about needing a referral to Dr. Diane for podiatry. Patient says she hit her foot on iron bed about a month ago and under her toenail is bruised pretty bad and it's not getting any better. She called their office to make an appointment but stated patient needs referral for the appointment.    Patient scheduled @ office for 03/25. I wasn't sure if patient needed to be seen by Dr. Loving before referral can be placed.

## 2024-03-14 DIAGNOSIS — M79.676 PAIN OF TOE, UNSPECIFIED LATERALITY: Primary | ICD-10-CM

## 2024-03-28 DIAGNOSIS — I25.83 CORONARY ARTERY DISEASE DUE TO LIPID RICH PLAQUE: ICD-10-CM

## 2024-03-28 DIAGNOSIS — E78.2 MIXED HYPERLIPIDEMIA: ICD-10-CM

## 2024-03-28 DIAGNOSIS — I25.10 CORONARY ARTERY DISEASE DUE TO LIPID RICH PLAQUE: ICD-10-CM

## 2024-03-28 RX ORDER — EVOLOCUMAB 140 MG/ML
INJECTION, SOLUTION SUBCUTANEOUS
Qty: 2 ML | Refills: 5 | Status: SHIPPED | OUTPATIENT
Start: 2024-03-28

## 2024-05-13 RX ORDER — CARVEDILOL 12.5 MG/1
TABLET ORAL
Qty: 180 TABLET | Refills: 1 | Status: SHIPPED | OUTPATIENT
Start: 2024-05-13

## 2024-05-23 DIAGNOSIS — E03.9 ACQUIRED HYPOTHYROIDISM: ICD-10-CM

## 2024-05-23 DIAGNOSIS — E78.2 MIXED HYPERLIPIDEMIA: Primary | ICD-10-CM

## 2024-05-23 DIAGNOSIS — I10 ESSENTIAL HYPERTENSION: ICD-10-CM

## 2024-05-29 LAB
ALBUMIN SERPL-MCNC: 4.4 G/DL (ref 3.5–5.2)
ALBUMIN/GLOB SERPL: 2.2 G/DL
ALP SERPL-CCNC: 94 U/L (ref 39–117)
ALT SERPL-CCNC: 14 U/L (ref 1–33)
AST SERPL-CCNC: 21 U/L (ref 1–32)
BASOPHILS # BLD AUTO: 0.06 10*3/MM3 (ref 0–0.2)
BASOPHILS NFR BLD AUTO: 0.8 % (ref 0–1.5)
BILIRUB SERPL-MCNC: 0.6 MG/DL (ref 0–1.2)
BUN SERPL-MCNC: 17 MG/DL (ref 8–23)
BUN/CREAT SERPL: 28.3 (ref 7–25)
CALCIUM SERPL-MCNC: 9.4 MG/DL (ref 8.6–10.5)
CHLORIDE SERPL-SCNC: 105 MMOL/L (ref 98–107)
CHOLEST SERPL-MCNC: 141 MG/DL (ref 0–200)
CO2 SERPL-SCNC: 25.1 MMOL/L (ref 22–29)
CREAT SERPL-MCNC: 0.6 MG/DL (ref 0.57–1)
EGFRCR SERPLBLD CKD-EPI 2021: 90.9 ML/MIN/1.73
EOSINOPHIL # BLD AUTO: 0.27 10*3/MM3 (ref 0–0.4)
EOSINOPHIL NFR BLD AUTO: 3.7 % (ref 0.3–6.2)
ERYTHROCYTE [DISTWIDTH] IN BLOOD BY AUTOMATED COUNT: 13.1 % (ref 12.3–15.4)
GLOBULIN SER CALC-MCNC: 2 GM/DL
GLUCOSE SERPL-MCNC: 103 MG/DL (ref 65–99)
HCT VFR BLD AUTO: 32.9 % (ref 34–46.6)
HDLC SERPL-MCNC: 71 MG/DL (ref 40–60)
HGB BLD-MCNC: 10.8 G/DL (ref 12–15.9)
IMM GRANULOCYTES # BLD AUTO: 0.02 10*3/MM3 (ref 0–0.05)
IMM GRANULOCYTES NFR BLD AUTO: 0.3 % (ref 0–0.5)
LDLC SERPL CALC-MCNC: 56 MG/DL (ref 0–100)
LYMPHOCYTES # BLD AUTO: 1.18 10*3/MM3 (ref 0.7–3.1)
LYMPHOCYTES NFR BLD AUTO: 16.2 % (ref 19.6–45.3)
MCH RBC QN AUTO: 29.2 PG (ref 26.6–33)
MCHC RBC AUTO-ENTMCNC: 32.8 G/DL (ref 31.5–35.7)
MCV RBC AUTO: 88.9 FL (ref 79–97)
MONOCYTES # BLD AUTO: 0.52 10*3/MM3 (ref 0.1–0.9)
MONOCYTES NFR BLD AUTO: 7.1 % (ref 5–12)
NEUTROPHILS # BLD AUTO: 5.23 10*3/MM3 (ref 1.7–7)
NEUTROPHILS NFR BLD AUTO: 71.9 % (ref 42.7–76)
NRBC BLD AUTO-RTO: 0 /100 WBC (ref 0–0.2)
PLATELET # BLD AUTO: 318 10*3/MM3 (ref 140–450)
POTASSIUM SERPL-SCNC: 4.1 MMOL/L (ref 3.5–5.2)
PROT SERPL-MCNC: 6.4 G/DL (ref 6–8.5)
RBC # BLD AUTO: 3.7 10*6/MM3 (ref 3.77–5.28)
SODIUM SERPL-SCNC: 141 MMOL/L (ref 136–145)
TRIGL SERPL-MCNC: 70 MG/DL (ref 0–150)
TSH SERPL DL<=0.005 MIU/L-ACNC: 1.52 UIU/ML (ref 0.27–4.2)
VLDLC SERPL CALC-MCNC: 14 MG/DL (ref 5–40)
WBC # BLD AUTO: 7.28 10*3/MM3 (ref 3.4–10.8)

## 2024-06-03 ENCOUNTER — OFFICE VISIT (OUTPATIENT)
Dept: INTERNAL MEDICINE | Facility: CLINIC | Age: 79
End: 2024-06-03
Payer: MEDICARE

## 2024-06-03 VITALS
HEART RATE: 67 BPM | OXYGEN SATURATION: 97 % | SYSTOLIC BLOOD PRESSURE: 142 MMHG | BODY MASS INDEX: 22.63 KG/M2 | WEIGHT: 123 LBS | DIASTOLIC BLOOD PRESSURE: 72 MMHG | HEIGHT: 62 IN

## 2024-06-03 DIAGNOSIS — D51.9 ANEMIA DUE TO VITAMIN B12 DEFICIENCY, UNSPECIFIED B12 DEFICIENCY TYPE: Primary | ICD-10-CM

## 2024-06-03 DIAGNOSIS — M79.674 GREAT TOE PAIN, RIGHT: ICD-10-CM

## 2024-06-03 DIAGNOSIS — R22.1 NODULE OF NECK: ICD-10-CM

## 2024-06-03 DIAGNOSIS — I10 ESSENTIAL HYPERTENSION: ICD-10-CM

## 2024-06-03 DIAGNOSIS — E53.8 B12 DEFICIENCY: ICD-10-CM

## 2024-06-03 PROCEDURE — 3078F DIAST BP <80 MM HG: CPT | Performed by: INTERNAL MEDICINE

## 2024-06-03 PROCEDURE — 1126F AMNT PAIN NOTED NONE PRSNT: CPT | Performed by: INTERNAL MEDICINE

## 2024-06-03 PROCEDURE — 1159F MED LIST DOCD IN RCRD: CPT | Performed by: INTERNAL MEDICINE

## 2024-06-03 PROCEDURE — 1160F RVW MEDS BY RX/DR IN RCRD: CPT | Performed by: INTERNAL MEDICINE

## 2024-06-03 PROCEDURE — 99214 OFFICE O/P EST MOD 30 MIN: CPT | Performed by: INTERNAL MEDICINE

## 2024-06-03 PROCEDURE — G2211 COMPLEX E/M VISIT ADD ON: HCPCS | Performed by: INTERNAL MEDICINE

## 2024-06-03 PROCEDURE — 3077F SYST BP >= 140 MM HG: CPT | Performed by: INTERNAL MEDICINE

## 2024-06-03 NOTE — PROGRESS NOTES
Chief Complaint   Patient presents with    Hypertension    Hyperlipidemia    Anemia       History of Present Illness   Dee Jorge is a 79 y.o. female presents for f/u w acute needs. Patient reports foot pain. Predominantly her right great toe. Notes that she has some pain in her toe w swelling. She went to podiatry. Had B xrays. She was diagnosed w/ arthritis. She reports that she had uric acid tested as well.   Patient w b12 and iron def anemia. She notes somfe fatigue. Hgb now 10.8. she does feel fatigued at this time.   Bp elevated on arrival. She notes that bp is 120s at home.   Has hyperlipidemia. Lipids are at goal on repatha.           The following portions of the patient's history were reviewed and updated as appropriate: allergies, current medications, past family history, past medical history, past social history, past surgical history and problem list.  Current Outpatient Medications on File Prior to Visit   Medication Sig Dispense Refill    aspirin 81 MG EC tablet Take 1 tablet by mouth Daily. 100 tablet 3    carvedilol (COREG) 12.5 MG tablet TAKE 1 TABLET BY MOUTH TWICE DAILY 180 tablet 1    fluticasone (FLONASE) 50 MCG/ACT nasal spray SPRAY TWO SPRAYS IN EACH NOSTRIL ONCE DAILY 1 bottle 5    levalbuterol (Xopenex HFA) 45 MCG/ACT inhaler Inhale 1-2 puffs Every 4 (Four) Hours As Needed for Wheezing. 1 each 5    levothyroxine (SYNTHROID, LEVOTHROID) 88 MCG tablet TAKE 1 TABLET BY MOUTH DAILY 90 tablet 1    Repatha SureClick solution auto-injector SureClick injection ADMINISTER 1 ML UNDER THE SKIN EVERY 14 DAYS AS DIRECTED 2 mL 5    vitamin B-12 (CYANOCOBALAMIN) 1000 MCG tablet Take 1 tablet by mouth Daily. 90 tablet 4     No current facility-administered medications on file prior to visit.     Review of Systems   Constitutional: Negative.    HENT: Negative.     Eyes: Negative.    Respiratory: Negative.     Cardiovascular: Negative.    Gastrointestinal: Negative.    Endocrine: Negative.   "  Genitourinary: Negative.    Musculoskeletal: Negative.    Skin: Negative.    Allergic/Immunologic: Negative.    Neurological: Negative.    Hematological: Negative.    Psychiatric/Behavioral: Negative.         Objective   Physical Exam  Vitals and nursing note reviewed.   Constitutional:       Appearance: Normal appearance. She is well-developed.   HENT:      Head: Normocephalic and atraumatic.      Right Ear: Tympanic membrane and external ear normal.      Left Ear: Tympanic membrane and external ear normal.      Nose: Nose normal.      Mouth/Throat:      Mouth: Mucous membranes are moist.   Eyes:      Extraocular Movements: Extraocular movements intact.      Pupils: Pupils are equal, round, and reactive to light.   Cardiovascular:      Rate and Rhythm: Normal rate and regular rhythm.      Pulses: Normal pulses.      Heart sounds: Normal heart sounds.   Pulmonary:      Effort: Pulmonary effort is normal. No respiratory distress.      Breath sounds: Normal breath sounds.   Abdominal:      General: Abdomen is flat.      Palpations: Abdomen is soft.   Musculoskeletal:         General: Normal range of motion.      Cervical back: Normal range of motion and neck supple.   Skin:     General: Skin is warm and dry.   Neurological:      General: No focal deficit present.      Mental Status: She is alert and oriented to person, place, and time.   Psychiatric:         Mood and Affect: Mood normal.         Behavior: Behavior normal.         Thought Content: Thought content normal.         Judgment: Judgment normal.          /72   Pulse 67   Ht 157.5 cm (62\")   Wt 55.8 kg (123 lb)   LMP  (LMP Unknown)   SpO2 97%   BMI 22.50 kg/m²     Assessment & Plan   Diagnoses and all orders for this visit:    Anemia due to vitamin B12 deficiency, unspecified B12 deficiency type  -     CBC & Differential  -     Vitamin B12  -     Iron Profile  -     Intrinsic Factor Ab    Essential hypertension    B12 deficiency    Great toe " pain, right      Patient w toe pain. She does appear to have some impaingement of the skin by her nailbed. She saw podiatry and is offered nail removal. Agree that this may be definitive treatment and she should f/u in that office if needed. No evidence of gout at this time. She has b12 deficiency and iron def anemia. Will repeat levels and will supplement if needed. She will monitor bp at home. She will track and report. She will f/u here routinely.

## 2024-06-05 LAB
BASOPHILS # BLD AUTO: 0.09 10*3/MM3 (ref 0–0.2)
BASOPHILS NFR BLD AUTO: 1.2 % (ref 0–1.5)
EOSINOPHIL # BLD AUTO: 0.28 10*3/MM3 (ref 0–0.4)
EOSINOPHIL NFR BLD AUTO: 3.8 % (ref 0.3–6.2)
ERYTHROCYTE [DISTWIDTH] IN BLOOD BY AUTOMATED COUNT: 13.1 % (ref 12.3–15.4)
HCT VFR BLD AUTO: 32.9 % (ref 34–46.6)
HGB BLD-MCNC: 10.5 G/DL (ref 12–15.9)
IF BLOCK AB SER-ACNC: 123.9 AU/ML (ref 0–1.1)
IMM GRANULOCYTES # BLD AUTO: 0.02 10*3/MM3 (ref 0–0.05)
IMM GRANULOCYTES NFR BLD AUTO: 0.3 % (ref 0–0.5)
IRON SATN MFR SERPL: 14 % (ref 20–50)
IRON SERPL-MCNC: 70 MCG/DL (ref 37–145)
LYMPHOCYTES # BLD AUTO: 1.56 10*3/MM3 (ref 0.7–3.1)
LYMPHOCYTES NFR BLD AUTO: 21.3 % (ref 19.6–45.3)
MCH RBC QN AUTO: 28.7 PG (ref 26.6–33)
MCHC RBC AUTO-ENTMCNC: 31.9 G/DL (ref 31.5–35.7)
MCV RBC AUTO: 89.9 FL (ref 79–97)
MONOCYTES # BLD AUTO: 0.56 10*3/MM3 (ref 0.1–0.9)
MONOCYTES NFR BLD AUTO: 7.7 % (ref 5–12)
NEUTROPHILS # BLD AUTO: 4.8 10*3/MM3 (ref 1.7–7)
NEUTROPHILS NFR BLD AUTO: 65.7 % (ref 42.7–76)
NRBC BLD AUTO-RTO: 0 /100 WBC (ref 0–0.2)
PLATELET # BLD AUTO: 363 10*3/MM3 (ref 140–450)
RBC # BLD AUTO: 3.66 10*6/MM3 (ref 3.77–5.28)
TIBC SERPL-MCNC: 492 MCG/DL
UIBC SERPL-MCNC: 422 MCG/DL (ref 112–346)
VIT B12 SERPL-MCNC: 264 PG/ML (ref 211–946)
WBC # BLD AUTO: 7.31 10*3/MM3 (ref 3.4–10.8)

## 2024-06-07 RX ORDER — CYANOCOBALAMIN 1000 UG/ML
1000 INJECTION, SOLUTION INTRAMUSCULAR; SUBCUTANEOUS
Qty: 3 ML | Refills: 3 | Status: SHIPPED | OUTPATIENT
Start: 2024-06-07

## 2024-06-10 ENCOUNTER — TELEPHONE (OUTPATIENT)
Dept: INTERNAL MEDICINE | Facility: CLINIC | Age: 79
End: 2024-06-10
Payer: MEDICARE

## 2024-06-10 NOTE — TELEPHONE ENCOUNTER
Patient stated that she is returning a call back from Lisa or Dr. Loving. She wanted to know what you thought is better, for her to come to office and get shot or for her to  medicine from pharmacy.     Best call back # [phone]

## 2024-06-20 ENCOUNTER — CLINICAL SUPPORT (OUTPATIENT)
Dept: INTERNAL MEDICINE | Facility: CLINIC | Age: 79
End: 2024-06-20
Payer: MEDICARE

## 2024-06-20 DIAGNOSIS — E53.8 B12 DEFICIENCY: Primary | ICD-10-CM

## 2024-06-20 PROCEDURE — 96372 THER/PROPH/DIAG INJ SC/IM: CPT | Performed by: INTERNAL MEDICINE

## 2024-06-20 RX ORDER — CYANOCOBALAMIN 1000 UG/ML
1000 INJECTION, SOLUTION INTRAMUSCULAR; SUBCUTANEOUS
Status: SHIPPED | OUTPATIENT
Start: 2024-06-20

## 2024-06-20 RX ADMIN — CYANOCOBALAMIN 1000 MCG: 1000 INJECTION, SOLUTION INTRAMUSCULAR; SUBCUTANEOUS at 09:39

## 2024-07-24 ENCOUNTER — CLINICAL SUPPORT (OUTPATIENT)
Dept: INTERNAL MEDICINE | Facility: CLINIC | Age: 79
End: 2024-07-24
Payer: MEDICARE

## 2024-07-24 DIAGNOSIS — E53.8 B12 DEFICIENCY: Primary | ICD-10-CM

## 2024-07-24 PROCEDURE — 96372 THER/PROPH/DIAG INJ SC/IM: CPT | Performed by: INTERNAL MEDICINE

## 2024-07-24 RX ORDER — CYANOCOBALAMIN 1000 UG/ML
1000 INJECTION, SOLUTION INTRAMUSCULAR; SUBCUTANEOUS
Status: SHIPPED | OUTPATIENT
Start: 2024-07-24

## 2024-07-24 RX ADMIN — CYANOCOBALAMIN 1000 MCG: 1000 INJECTION, SOLUTION INTRAMUSCULAR; SUBCUTANEOUS at 08:34

## 2024-07-25 ENCOUNTER — OFFICE VISIT (OUTPATIENT)
Dept: INTERNAL MEDICINE | Facility: CLINIC | Age: 79
End: 2024-07-25
Payer: MEDICARE

## 2024-07-25 VITALS
OXYGEN SATURATION: 96 % | HEIGHT: 62 IN | BODY MASS INDEX: 21.95 KG/M2 | WEIGHT: 119.3 LBS | DIASTOLIC BLOOD PRESSURE: 90 MMHG | HEART RATE: 95 BPM | SYSTOLIC BLOOD PRESSURE: 140 MMHG

## 2024-07-25 DIAGNOSIS — J45.21 MILD INTERMITTENT ASTHMA WITH ACUTE EXACERBATION: Primary | ICD-10-CM

## 2024-07-25 PROCEDURE — 99213 OFFICE O/P EST LOW 20 MIN: CPT | Performed by: STUDENT IN AN ORGANIZED HEALTH CARE EDUCATION/TRAINING PROGRAM

## 2024-07-25 PROCEDURE — 3080F DIAST BP >= 90 MM HG: CPT | Performed by: STUDENT IN AN ORGANIZED HEALTH CARE EDUCATION/TRAINING PROGRAM

## 2024-07-25 PROCEDURE — 3077F SYST BP >= 140 MM HG: CPT | Performed by: STUDENT IN AN ORGANIZED HEALTH CARE EDUCATION/TRAINING PROGRAM

## 2024-07-25 PROCEDURE — 1126F AMNT PAIN NOTED NONE PRSNT: CPT | Performed by: STUDENT IN AN ORGANIZED HEALTH CARE EDUCATION/TRAINING PROGRAM

## 2024-07-25 RX ORDER — BUDESONIDE AND FORMOTEROL FUMARATE DIHYDRATE 80; 4.5 UG/1; UG/1
2 AEROSOL RESPIRATORY (INHALATION)
Qty: 6.9 G | Refills: 2 | Status: SHIPPED | OUTPATIENT
Start: 2024-07-25

## 2024-07-25 RX ORDER — PREDNISONE 20 MG/1
40 TABLET ORAL DAILY
Qty: 10 TABLET | Refills: 0 | Status: SHIPPED | OUTPATIENT
Start: 2024-07-25 | End: 2024-07-30

## 2024-07-25 RX ORDER — LEVALBUTEROL TARTRATE 45 UG/1
1-2 AEROSOL, METERED ORAL EVERY 4 HOURS PRN
Qty: 1 EACH | Refills: 5 | Status: SHIPPED | OUTPATIENT
Start: 2024-07-25

## 2024-07-25 NOTE — PROGRESS NOTES
Alexis Chavez M.D.  Internal Medicine  [facility]  [address], [address]  [place] [address]  [phone]      Chief Complaint  Cough (Coughing and worse at night x 1 week //)    SUBJECTIVE    History of Present Illness    Dee Jorge is a 79 y.o. female with asthmawho presents to the office today as an established patient of Dr Kacie Loving MD here today for an acute care visit.     She has been coughing and unable to sleep. Gets URI once a year. Feels tight in chest, cough is dry. She is wheezing. Not using inhaler since it is empty. Had negaive COVID test at home. No fevers/chills. Cough keeps her awake. Took Mucinex without relief. No sick contacts. No nasal congestion. Worse with weather and being outside. Shortness of breath walking her dog    Review of Systems    Allergies   Allergen Reactions    Statins Myalgia    Codeine Nausea And Vomiting    Penicillins Hives    Sulfa Antibiotics Nausea And Vomiting and Other (See Comments)     HEADACHES        Outpatient Medications Marked as Taking for the 7/25/24 encounter (Office Visit) with Alexis Chavez MD   Medication Sig Dispense Refill    aspirin 81 MG EC tablet Take 1 tablet by mouth Daily. 100 tablet 3    carvedilol (COREG) 12.5 MG tablet TAKE 1 TABLET BY MOUTH TWICE DAILY 180 tablet 1    fluticasone (FLONASE) 50 MCG/ACT nasal spray SPRAY TWO SPRAYS IN EACH NOSTRIL ONCE DAILY 1 bottle 5    levalbuterol (Xopenex HFA) 45 MCG/ACT inhaler Inhale 1-2 puffs Every 4 (Four) Hours As Needed for Wheezing. 1 each 5    levothyroxine (SYNTHROID, LEVOTHROID) 88 MCG tablet TAKE 1 TABLET BY MOUTH DAILY 90 tablet 1    Repatha SureClick solution auto-injector SureClick injection ADMINISTER 1 ML UNDER THE SKIN EVERY 14 DAYS AS DIRECTED 2 mL 5    [DISCONTINUED] levalbuterol (Xopenex HFA) 45 MCG/ACT inhaler Inhale 1-2 puffs Every 4 (Four) Hours As Needed for Wheezing. 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 CHARLSE BARTLETT    Seborrheic keratosis     Trigger finger, left middle finger 02/2016     Past Surgical History:   Procedure Laterality Date    ANTERIOR AND POSTERIOR VAGINAL REPAIR N/A 09/21/2011    RECTOCELE AND CYSTOCELE REPAIR, PLACEMENT OF PROLIFT GRAFT, DR. LISA PALM AT [facility]    BUNIONECTOMY Left 08/02/2010    LEFT MEDIAL EXTRA-ARTICULAR GANGLION CYST REMOVAL AND FIRST MTP CHILECTOMY WITH SYNOVECTOMY, DR. BHAVANI BABCOCK AT [facility]    CHOLECYSTECTOMY N/A 1992    DR. QUINCY CEE    COLONOSCOPY N/A 09/18/2015    ENTIRE COLON WNL, RESCOPE IN 10 YRS, DR. QUINCY VILLARREAL AT [place]    COLONOSCOPY N/A 07/20/2005    WNL, DR. MAY VAZQUEZ AT [facility]    COLONOSCOPY N/A 1/22/2020    ENTIRE COLON WNL, RESCOPE IN 10 YRS, DR. CHINO MONROE AT [facility]    HERNIA REPAIR Bilateral 1972    DR. AIDEN AZRATE    HYSTERECTOMY N/A 1984    DR. ZEKE BLANDON    KNEE ARTHROSCOPY Right     KNEE CARTILAGE SURGERY Right  "08/21/2012    RIGHT CHONDROPLASTY OF PATELLA AND TROCHLEA, CHONDROPLASTY MEDIAL FEMORAL CONDYLE, CHONDROPLASTY LATERAL TIBIAL PLATEAU AND LATERAL FEMORAL CONDYLE, SUBTOTAL LATERAL MENISCECTOMY, OSTEOPLASTY ANTERIOR TIBIAL INTERCONDYLAR NOTCH, DR. BHAVANI BABCOCK AT [facility]    NASAL ENDOSCOPY N/A 03/11/2015    ANTERIOR RHINOSCOPY, CONGESTION OF NASAL MUCOSA, DR. BRENDAN CHAMPION    SALPINGO OOPHORECTOMY Right 07/30/2008    FOR ADNEXAL MASS, DR. LISA PALM AT [facility]    TOTAL HIP ARTHROPLASTY Left 05/06/2013    DR.REID PALM AT [facility]    TOTAL HIP ARTHROPLASTY Right 10/13/2008    DR. KELVIN TARIQ AT [place]    TOTAL KNEE ARTHROPLASTY Left 01/20/2014    DR. GORDO PALM AT [facility]     Family History   Problem Relation Age of Onset    Hypertension Mother     Thyroid disease Mother     Stroke Mother     Asthma Mother     Asthma Father     Emphysema Father     Alcohol abuse Father     Hypertension Father     COPD Father     Thyroid disease Daughter     Diabetes type I Daughter     Diabetes Daughter     Stroke Maternal Grandfather         ischemic    Kidney disease Sister     Hypertension Sister     Charleston's disease Grandchild     Asthma Grandchild     Breast cancer Neg Hx     reports that she has never smoked. She has never been exposed to tobacco smoke. She has never used smokeless tobacco. She reports that she does not drink alcohol and does not use drugs.    OBJECTIVE    Vital Signs:   /90   Pulse 95   Ht 157.5 cm (62.01\")   Wt 54.1 kg (119 lb 4.8 oz)   SpO2 96%   BMI 21.81 kg/m²     Physical Exam  Constitutional:       Appearance: Normal appearance. She is normal weight.   HENT:      Right Ear: Tympanic membrane normal.      Left Ear: Tympanic membrane normal.      Mouth/Throat:      Mouth: Mucous membranes are moist.      Pharynx: Oropharynx is clear. No oropharyngeal exudate or posterior oropharyngeal erythema.   Cardiovascular:      Rate and Rhythm: Normal rate and regular rhythm.      Heart sounds: Normal heart " sounds. No murmur heard.  Pulmonary:      Effort: Pulmonary effort is normal.      Breath sounds: Normal breath sounds.   Musculoskeletal:      Right lower leg: No edema.      Left lower leg: No edema.   Skin:     General: Skin is warm and dry.   Neurological:      Mental Status: She is alert.   Psychiatric:         Mood and Affect: Mood normal.         Behavior: Behavior normal.         Thought Content: Thought content normal.            The following data was reviewed by: Alexis Chavez MD on 07/25/2024:  CMP          11/15/2023    08:54 12/4/2023    09:29 5/29/2024    09:22   CMP   Glucose 103  101  103    BUN 21  14  17    Creatinine 0.59  0.78  0.60    Sodium 141  139  141    Potassium 4.0  4.1  4.1    Chloride 106  105  105    Calcium 9.8  9.9  9.4    Total Protein 6.4   6.4    Albumin 4.6   4.4    Globulin 1.8   2.0    Total Bilirubin 0.4   0.6    Alkaline Phosphatase 83   94    AST (SGOT) 20   21    ALT (SGPT) 12   14    BUN/Creatinine Ratio 35.6  17.9  28.3      CBC w/diff          12/4/2023    09:29 5/29/2024    09:22 6/3/2024    11:09   CBC w/Diff   WBC 6.67  7.28  7.31    RBC 3.88  3.70  3.66    Hemoglobin 11.4  10.8  10.5    Hematocrit 34.2  32.9  32.9    MCV 88.1  88.9  89.9    MCH 29.4  29.2  28.7    MCHC 33.3  32.8  31.9    RDW 13.0  13.1  13.1    Platelets 380  318  363    Neutrophil Rel % 66.4  71.9  65.7    Lymphocyte Rel % 21.3  16.2  21.3    Monocyte Rel % 7.0  7.1  7.7    Eosinophil Rel % 4.2  3.7  3.8    Basophil Rel % 1.0  0.8  1.2      Lipid Panel          11/15/2023    08:54 5/29/2024    09:22   Lipid Panel   Total Cholesterol 195  141    Triglycerides 106  70    HDL Cholesterol 59  71    VLDL Cholesterol 19  14    LDL Cholesterol  117  56      TSH          11/15/2023    08:54 5/29/2024    09:22   TSH   TSH 1.240  1.520        Data reviewed : Recent PCP note              ASSESSMENT & PLAN     Diagnoses and all orders for this visit:    1. Mild intermittent asthma with acute exacerbation  (Primary)  -     levalbuterol (Xopenex HFA) 45 MCG/ACT inhaler; Inhale 1-2 puffs Every 4 (Four) Hours As Needed for Wheezing.  Dispense: 1 each; Refill: 5  -     predniSONE (DELTASONE) 20 MG tablet; Take 2 tablets by mouth Daily for 5 days.  Dispense: 10 tablet; Refill: 0  -     budesonide-formoterol (Symbicort) 80-4.5 MCG/ACT inhaler; Inhale 2 puffs 2 (Two) Times a Day. Rinse mouth out after use.  Dispense: 6.9 g; Refill: 2      79-year-old with history of asthma here today for coughing at night.  This is quite bothersome and keeping her awake at night.  This is worsened by being outside in the hot weather.  She is out of her rescue inhaler.  This was refilled today.  Start course of steroids for suspected asthma exacerbation.  Adding Symbicort.  Discussed proper use. Patient counseled to seek medical care for new or worsening symptoms or failure of symptoms to improve.       Blood pressure elevated today.She needs to take her afternoon dose of Coreg.  Monitor blood pressure.  Bring log to next BP appointment.  Call if elevated at home.    Health Maintenance Due   Topic Date Due    RSV Vaccine - Adults (1 - 1-dose 60+ series) Never done    ZOSTER VACCINE (2 of 3) 02/26/2008    DXA SCAN  08/21/2019    COVID-19 Vaccine (4 - 2023-24 season) 09/01/2023    TDAP/TD VACCINES (2 - Tdap) 07/15/2024        Follow Up  No follow-ups on file.    Patient/family had no further questions at this time and verbalized understanding of the plan discussed today.

## 2024-08-01 DIAGNOSIS — E78.2 MIXED HYPERLIPIDEMIA: ICD-10-CM

## 2024-08-01 DIAGNOSIS — I25.83 CORONARY ARTERY DISEASE DUE TO LIPID RICH PLAQUE: ICD-10-CM

## 2024-08-01 DIAGNOSIS — I25.10 CORONARY ARTERY DISEASE DUE TO LIPID RICH PLAQUE: ICD-10-CM

## 2024-08-01 RX ORDER — EVOLOCUMAB 140 MG/ML
140 INJECTION, SOLUTION SUBCUTANEOUS
Qty: 6 ML | Refills: 3 | Status: SHIPPED | OUTPATIENT
Start: 2024-08-01

## 2024-08-08 RX ORDER — LEVOTHYROXINE SODIUM 88 UG/1
88 TABLET ORAL DAILY
Qty: 90 TABLET | Refills: 1 | Status: SHIPPED | OUTPATIENT
Start: 2024-08-08

## 2024-08-26 ENCOUNTER — CLINICAL SUPPORT (OUTPATIENT)
Dept: INTERNAL MEDICINE | Facility: CLINIC | Age: 79
End: 2024-08-26
Payer: MEDICARE

## 2024-08-26 DIAGNOSIS — E53.8 B12 DEFICIENCY: Primary | ICD-10-CM

## 2024-08-26 PROCEDURE — 96372 THER/PROPH/DIAG INJ SC/IM: CPT | Performed by: INTERNAL MEDICINE

## 2024-08-26 RX ADMIN — CYANOCOBALAMIN 1000 MCG: 1000 INJECTION, SOLUTION INTRAMUSCULAR; SUBCUTANEOUS at 10:22

## 2024-09-06 ENCOUNTER — OFFICE VISIT (OUTPATIENT)
Dept: INTERNAL MEDICINE | Facility: CLINIC | Age: 79
End: 2024-09-06
Payer: MEDICARE

## 2024-09-06 VITALS
HEART RATE: 82 BPM | OXYGEN SATURATION: 97 % | WEIGHT: 120 LBS | SYSTOLIC BLOOD PRESSURE: 156 MMHG | BODY MASS INDEX: 22.08 KG/M2 | DIASTOLIC BLOOD PRESSURE: 94 MMHG | HEIGHT: 62 IN

## 2024-09-06 DIAGNOSIS — D51.9 ANEMIA DUE TO VITAMIN B12 DEFICIENCY, UNSPECIFIED B12 DEFICIENCY TYPE: ICD-10-CM

## 2024-09-06 DIAGNOSIS — L30.9 DERMATITIS: Primary | ICD-10-CM

## 2024-09-06 LAB
BASOPHILS # BLD AUTO: 0.06 10*3/MM3 (ref 0–0.2)
BASOPHILS NFR BLD AUTO: 0.7 % (ref 0–1.5)
EOSINOPHIL # BLD AUTO: 0.34 10*3/MM3 (ref 0–0.4)
EOSINOPHIL NFR BLD AUTO: 4.2 % (ref 0.3–6.2)
ERYTHROCYTE [DISTWIDTH] IN BLOOD BY AUTOMATED COUNT: 13 % (ref 12.3–15.4)
HCT VFR BLD AUTO: 32.5 % (ref 34–46.6)
HGB BLD-MCNC: 10.7 G/DL (ref 12–15.9)
IMM GRANULOCYTES # BLD AUTO: 0.02 10*3/MM3 (ref 0–0.05)
IMM GRANULOCYTES NFR BLD AUTO: 0.2 % (ref 0–0.5)
LYMPHOCYTES # BLD AUTO: 1.38 10*3/MM3 (ref 0.7–3.1)
LYMPHOCYTES NFR BLD AUTO: 16.9 % (ref 19.6–45.3)
MCH RBC QN AUTO: 29.3 PG (ref 26.6–33)
MCHC RBC AUTO-ENTMCNC: 32.9 G/DL (ref 31.5–35.7)
MCV RBC AUTO: 89 FL (ref 79–97)
MONOCYTES # BLD AUTO: 0.58 10*3/MM3 (ref 0.1–0.9)
MONOCYTES NFR BLD AUTO: 7.1 % (ref 5–12)
NEUTROPHILS # BLD AUTO: 5.78 10*3/MM3 (ref 1.7–7)
NEUTROPHILS NFR BLD AUTO: 70.9 % (ref 42.7–76)
NRBC BLD AUTO-RTO: 0 /100 WBC (ref 0–0.2)
PLATELET # BLD AUTO: 375 10*3/MM3 (ref 140–450)
RBC # BLD AUTO: 3.65 10*6/MM3 (ref 3.77–5.28)
VIT B12 SERPL-MCNC: 580 PG/ML (ref 211–946)
WBC # BLD AUTO: 8.16 10*3/MM3 (ref 3.4–10.8)

## 2024-09-06 PROCEDURE — 99213 OFFICE O/P EST LOW 20 MIN: CPT | Performed by: INTERNAL MEDICINE

## 2024-09-06 PROCEDURE — 1126F AMNT PAIN NOTED NONE PRSNT: CPT | Performed by: INTERNAL MEDICINE

## 2024-09-06 PROCEDURE — 3080F DIAST BP >= 90 MM HG: CPT | Performed by: INTERNAL MEDICINE

## 2024-09-06 PROCEDURE — 1160F RVW MEDS BY RX/DR IN RCRD: CPT | Performed by: INTERNAL MEDICINE

## 2024-09-06 PROCEDURE — 1159F MED LIST DOCD IN RCRD: CPT | Performed by: INTERNAL MEDICINE

## 2024-09-06 PROCEDURE — 3077F SYST BP >= 140 MM HG: CPT | Performed by: INTERNAL MEDICINE

## 2024-09-06 RX ORDER — TRIAMCINOLONE ACETONIDE 1 MG/G
1 OINTMENT TOPICAL 2 TIMES DAILY
Qty: 30 G | Refills: 0 | Status: SHIPPED | OUTPATIENT
Start: 2024-09-06

## 2024-09-06 NOTE — PROGRESS NOTES
Chief Complaint   Patient presents with    Itching     Nape of neck       Itching       Dee Jorge is a 79 y.o. female presents for acute needs. Patient notes that she has itching in the back of her scalp. She has been scratching at night and this keeps her awake. Possibly new hair product but always on aveda for years. Also area is localized to base of the skull only.     The following portions of the patient's history were reviewed and updated as appropriate: allergies, current medications, past family history, past medical history, past social history, past surgical history and problem list.  Current Outpatient Medications on File Prior to Visit   Medication Sig Dispense Refill    aspirin 81 MG EC tablet Take 1 tablet by mouth Daily. 100 tablet 3    budesonide-formoterol (Symbicort) 80-4.5 MCG/ACT inhaler Inhale 2 puffs 2 (Two) Times a Day. Rinse mouth out after use. 6.9 g 2    carvedilol (COREG) 12.5 MG tablet TAKE 1 TABLET BY MOUTH TWICE DAILY 180 tablet 1    Evolocumab (Repatha SureClick) solution auto-injector SureClick injection Inject 1 mL under the skin into the appropriate area as directed Every 14 (Fourteen) Days. 6 mL 3    fluticasone (FLONASE) 50 MCG/ACT nasal spray SPRAY TWO SPRAYS IN EACH NOSTRIL ONCE DAILY 1 bottle 5    levothyroxine (SYNTHROID, LEVOTHROID) 88 MCG tablet TAKE 1 TABLET BY MOUTH DAILY 90 tablet 1    levalbuterol (Xopenex HFA) 45 MCG/ACT inhaler Inhale 1-2 puffs Every 4 (Four) Hours As Needed for Wheezing. (Patient not taking: Reported on 9/6/2024) 1 each 5     Current Facility-Administered Medications on File Prior to Visit   Medication Dose Route Frequency Provider Last Rate Last Admin    cyanocobalamin injection 1,000 mcg  1,000 mcg Intramuscular Q28 Days Kacie Loving MD   1,000 mcg at 08/26/24 1022     Review of Systems   Constitutional: Negative.    HENT: Negative.     Eyes: Negative.    Respiratory: Negative.     Cardiovascular: Negative.    Gastrointestinal: Negative.   "  Endocrine: Negative.    Genitourinary: Negative.    Musculoskeletal: Negative.    Skin:  Positive for itching.        Small healting papules base of skull     Allergic/Immunologic: Negative.    Neurological: Negative.    Hematological: Negative.    Psychiatric/Behavioral: Negative.         Objective   Physical Exam  Vitals and nursing note reviewed.   Constitutional:       Appearance: Normal appearance. She is well-developed.   HENT:      Head: Normocephalic and atraumatic.      Right Ear: Tympanic membrane and external ear normal.      Left Ear: Tympanic membrane and external ear normal.      Nose: Nose normal.      Mouth/Throat:      Mouth: Mucous membranes are moist.   Eyes:      Extraocular Movements: Extraocular movements intact.      Pupils: Pupils are equal, round, and reactive to light.   Cardiovascular:      Rate and Rhythm: Normal rate and regular rhythm.      Pulses: Normal pulses.      Heart sounds: Normal heart sounds.   Pulmonary:      Effort: Pulmonary effort is normal. No respiratory distress.      Breath sounds: Normal breath sounds.   Abdominal:      General: Abdomen is flat.      Palpations: Abdomen is soft.   Musculoskeletal:         General: Normal range of motion.      Cervical back: Normal range of motion and neck supple.   Skin:     General: Skin is warm and dry.      Comments: Itching base of the skull   Neurological:      Mental Status: She is alert and oriented to person, place, and time.   Psychiatric:         Mood and Affect: Mood normal.         Behavior: Behavior normal.         Thought Content: Thought content normal.         Judgment: Judgment normal.          /94   Pulse 82   Ht 157.5 cm (62\")   Wt 54.4 kg (120 lb)   LMP  (LMP Unknown)   SpO2 97%   BMI 21.95 kg/m²     Assessment & Plan   Diagnoses and all orders for this visit:    Dermatitis    Other orders  -     triamcinolone (KENALOG) 0.1 % ointment; Apply 1 Application topically to the appropriate area as directed " 2 (Two) Times a Day.      Patient w acute dermatitis. Looks to be possible bite reaction. Will use topical kenalog and topical benadryl. Avoid heat to the area. Cold packs as needed. Prn evening antihistamine.

## 2024-09-09 ENCOUNTER — TELEPHONE (OUTPATIENT)
Dept: INTERNAL MEDICINE | Facility: CLINIC | Age: 79
End: 2024-09-09
Payer: MEDICARE

## 2024-09-09 NOTE — TELEPHONE ENCOUNTER
Pt informed        ----- Message from Kacie Loving sent at 9/9/2024 12:55 PM EDT -----  Patient has stable anemia w normal b12 levels  jw

## 2024-09-11 ENCOUNTER — TELEPHONE (OUTPATIENT)
Dept: INTERNAL MEDICINE | Facility: CLINIC | Age: 79
End: 2024-09-11
Payer: MEDICARE

## 2024-09-11 NOTE — TELEPHONE ENCOUNTER
Pt calling in regards to recent B!2 lab work- Pt has some questions about B12 levels and would like Lisa to please give her a call back at earliest convenience- Advised pt that Lisa is not in today but can give her a call tomorrow when she is back in office- Pt understood.

## 2024-10-10 ENCOUNTER — TELEPHONE (OUTPATIENT)
Dept: INTERNAL MEDICINE | Facility: CLINIC | Age: 79
End: 2024-10-10
Payer: MEDICARE

## 2024-10-10 NOTE — TELEPHONE ENCOUNTER
Caller: Dee Jorge    Relationship to patient: Self    Best call back number: [phone]     Chief complaint: B12 INJECTION    Type of visit: INJECTION    Requested date: ANY      If rescheduling, when is the original appointment: 9/30/24     Additional notes:

## 2024-10-14 ENCOUNTER — CLINICAL SUPPORT (OUTPATIENT)
Dept: INTERNAL MEDICINE | Facility: CLINIC | Age: 79
End: 2024-10-14
Payer: MEDICARE

## 2024-10-14 DIAGNOSIS — E53.8 B12 DEFICIENCY: Primary | ICD-10-CM

## 2024-10-14 PROCEDURE — 96372 THER/PROPH/DIAG INJ SC/IM: CPT | Performed by: INTERNAL MEDICINE

## 2024-10-14 RX ADMIN — CYANOCOBALAMIN 1000 MCG: 1000 INJECTION, SOLUTION INTRAMUSCULAR; SUBCUTANEOUS at 10:59

## 2024-11-04 DIAGNOSIS — I25.83 CORONARY ARTERY DISEASE DUE TO LIPID RICH PLAQUE: ICD-10-CM

## 2024-11-04 DIAGNOSIS — I25.10 CORONARY ARTERY DISEASE DUE TO LIPID RICH PLAQUE: ICD-10-CM

## 2024-11-04 DIAGNOSIS — E78.2 MIXED HYPERLIPIDEMIA: ICD-10-CM

## 2024-11-06 RX ORDER — CARVEDILOL 12.5 MG/1
TABLET ORAL
Qty: 180 TABLET | Refills: 1 | Status: SHIPPED | OUTPATIENT
Start: 2024-11-06

## 2024-11-14 ENCOUNTER — CLINICAL SUPPORT (OUTPATIENT)
Dept: INTERNAL MEDICINE | Facility: CLINIC | Age: 79
End: 2024-11-14
Payer: MEDICARE

## 2024-11-14 DIAGNOSIS — I10 ESSENTIAL HYPERTENSION: ICD-10-CM

## 2024-11-14 DIAGNOSIS — E78.2 MIXED HYPERLIPIDEMIA: Primary | ICD-10-CM

## 2024-11-14 DIAGNOSIS — E53.8 B12 DEFICIENCY: Primary | ICD-10-CM

## 2024-11-14 DIAGNOSIS — E03.9 ACQUIRED HYPOTHYROIDISM: ICD-10-CM

## 2024-11-14 PROCEDURE — 96372 THER/PROPH/DIAG INJ SC/IM: CPT | Performed by: INTERNAL MEDICINE

## 2024-11-14 RX ADMIN — CYANOCOBALAMIN 1000 MCG: 1000 INJECTION, SOLUTION INTRAMUSCULAR; SUBCUTANEOUS at 08:53

## 2024-11-18 LAB
ALBUMIN SERPL-MCNC: 4 G/DL (ref 3.5–5.2)
ALBUMIN/GLOB SERPL: 1.9 G/DL
ALP SERPL-CCNC: 91 U/L (ref 39–117)
ALT SERPL-CCNC: 12 U/L (ref 1–33)
AST SERPL-CCNC: 23 U/L (ref 1–32)
BASOPHILS # BLD AUTO: 0.08 10*3/MM3 (ref 0–0.2)
BASOPHILS NFR BLD AUTO: 0.9 % (ref 0–1.5)
BILIRUB SERPL-MCNC: 0.5 MG/DL (ref 0–1.2)
BUN SERPL-MCNC: 16 MG/DL (ref 8–23)
BUN/CREAT SERPL: 27.6 (ref 7–25)
CALCIUM SERPL-MCNC: 9.4 MG/DL (ref 8.6–10.5)
CHLORIDE SERPL-SCNC: 105 MMOL/L (ref 98–107)
CHOLEST SERPL-MCNC: 156 MG/DL (ref 0–200)
CO2 SERPL-SCNC: 24.2 MMOL/L (ref 22–29)
CREAT SERPL-MCNC: 0.58 MG/DL (ref 0.57–1)
EGFRCR SERPLBLD CKD-EPI 2021: 91.6 ML/MIN/1.73
EOSINOPHIL # BLD AUTO: 0.28 10*3/MM3 (ref 0–0.4)
EOSINOPHIL NFR BLD AUTO: 3.3 % (ref 0.3–6.2)
ERYTHROCYTE [DISTWIDTH] IN BLOOD BY AUTOMATED COUNT: 12.9 % (ref 12.3–15.4)
GLOBULIN SER CALC-MCNC: 2.1 GM/DL
GLUCOSE SERPL-MCNC: 105 MG/DL (ref 65–99)
HCT VFR BLD AUTO: 32.4 % (ref 34–46.6)
HDLC SERPL-MCNC: 63 MG/DL (ref 40–60)
HGB BLD-MCNC: 10.6 G/DL (ref 12–15.9)
IMM GRANULOCYTES # BLD AUTO: 0.03 10*3/MM3 (ref 0–0.05)
IMM GRANULOCYTES NFR BLD AUTO: 0.4 % (ref 0–0.5)
LDLC SERPL CALC-MCNC: 78 MG/DL (ref 0–100)
LYMPHOCYTES # BLD AUTO: 1.31 10*3/MM3 (ref 0.7–3.1)
LYMPHOCYTES NFR BLD AUTO: 15.4 % (ref 19.6–45.3)
MCH RBC QN AUTO: 28.4 PG (ref 26.6–33)
MCHC RBC AUTO-ENTMCNC: 32.7 G/DL (ref 31.5–35.7)
MCV RBC AUTO: 86.9 FL (ref 79–97)
MONOCYTES # BLD AUTO: 0.55 10*3/MM3 (ref 0.1–0.9)
MONOCYTES NFR BLD AUTO: 6.4 % (ref 5–12)
NEUTROPHILS # BLD AUTO: 6.28 10*3/MM3 (ref 1.7–7)
NEUTROPHILS NFR BLD AUTO: 73.6 % (ref 42.7–76)
NRBC BLD AUTO-RTO: 0 /100 WBC (ref 0–0.2)
PLATELET # BLD AUTO: 398 10*3/MM3 (ref 140–450)
POTASSIUM SERPL-SCNC: 3.8 MMOL/L (ref 3.5–5.2)
PROT SERPL-MCNC: 6.1 G/DL (ref 6–8.5)
RBC # BLD AUTO: 3.73 10*6/MM3 (ref 3.77–5.28)
SODIUM SERPL-SCNC: 140 MMOL/L (ref 136–145)
TRIGL SERPL-MCNC: 79 MG/DL (ref 0–150)
TSH SERPL DL<=0.005 MIU/L-ACNC: 0.96 UIU/ML (ref 0.27–4.2)
UNABLE TO VOID: NORMAL
VLDLC SERPL CALC-MCNC: 15 MG/DL (ref 5–40)
WBC # BLD AUTO: 8.53 10*3/MM3 (ref 3.4–10.8)

## 2024-12-20 ENCOUNTER — OFFICE VISIT (OUTPATIENT)
Dept: INTERNAL MEDICINE | Facility: CLINIC | Age: 79
End: 2024-12-20
Payer: MEDICARE

## 2024-12-20 VITALS
OXYGEN SATURATION: 96 % | WEIGHT: 114 LBS | DIASTOLIC BLOOD PRESSURE: 84 MMHG | BODY MASS INDEX: 20.98 KG/M2 | SYSTOLIC BLOOD PRESSURE: 144 MMHG | HEART RATE: 92 BPM | HEIGHT: 62 IN

## 2024-12-20 DIAGNOSIS — Z00.00 HEALTHCARE MAINTENANCE: Primary | ICD-10-CM

## 2024-12-20 DIAGNOSIS — S81.802A WOUND OF LEFT LOWER EXTREMITY, INITIAL ENCOUNTER: ICD-10-CM

## 2024-12-20 DIAGNOSIS — E78.2 MIXED HYPERLIPIDEMIA: ICD-10-CM

## 2024-12-20 DIAGNOSIS — S90.512A ABRASION, LEFT ANKLE, INITIAL ENCOUNTER: ICD-10-CM

## 2024-12-20 DIAGNOSIS — J06.9 UPPER RESPIRATORY TRACT INFECTION, UNSPECIFIED TYPE: ICD-10-CM

## 2024-12-20 DIAGNOSIS — I25.10 CORONARY ARTERY DISEASE DUE TO LIPID RICH PLAQUE: ICD-10-CM

## 2024-12-20 DIAGNOSIS — I25.83 CORONARY ARTERY DISEASE DUE TO LIPID RICH PLAQUE: ICD-10-CM

## 2024-12-20 DIAGNOSIS — E03.9 ACQUIRED HYPOTHYROIDISM: ICD-10-CM

## 2024-12-20 DIAGNOSIS — I10 ESSENTIAL HYPERTENSION: ICD-10-CM

## 2024-12-20 PROCEDURE — 1170F FXNL STATUS ASSESSED: CPT | Performed by: INTERNAL MEDICINE

## 2024-12-20 PROCEDURE — 1126F AMNT PAIN NOTED NONE PRSNT: CPT | Performed by: INTERNAL MEDICINE

## 2024-12-20 PROCEDURE — G0439 PPPS, SUBSEQ VISIT: HCPCS | Performed by: INTERNAL MEDICINE

## 2024-12-20 PROCEDURE — 3077F SYST BP >= 140 MM HG: CPT | Performed by: INTERNAL MEDICINE

## 2024-12-20 PROCEDURE — 3079F DIAST BP 80-89 MM HG: CPT | Performed by: INTERNAL MEDICINE

## 2024-12-20 PROCEDURE — 90714 TD VACC NO PRESV 7 YRS+ IM: CPT | Performed by: INTERNAL MEDICINE

## 2024-12-20 PROCEDURE — 90471 IMMUNIZATION ADMIN: CPT | Performed by: INTERNAL MEDICINE

## 2024-12-20 PROCEDURE — 96372 THER/PROPH/DIAG INJ SC/IM: CPT | Performed by: INTERNAL MEDICINE

## 2024-12-20 PROCEDURE — 99214 OFFICE O/P EST MOD 30 MIN: CPT | Performed by: INTERNAL MEDICINE

## 2024-12-20 RX ORDER — CODEINE PHOSPHATE AND GUAIFENESIN 10; 100 MG/5ML; MG/5ML
5 SOLUTION ORAL 3 TIMES DAILY PRN
Qty: 125 ML | Refills: 0 | Status: SHIPPED | OUTPATIENT
Start: 2024-12-20

## 2024-12-20 RX ORDER — FLUTICASONE PROPIONATE 50 MCG
2 SPRAY, SUSPENSION (ML) NASAL DAILY
Qty: 16 G | Refills: 5 | Status: SHIPPED | OUTPATIENT
Start: 2024-12-20

## 2024-12-20 RX ORDER — BENZONATATE 100 MG/1
100 CAPSULE ORAL 3 TIMES DAILY PRN
Qty: 30 CAPSULE | Refills: 3 | Status: SHIPPED | OUTPATIENT
Start: 2024-12-20

## 2024-12-20 RX ORDER — IPRATROPIUM BROMIDE 42 UG/1
2 SPRAY, METERED NASAL 3 TIMES DAILY
Qty: 15 ML | Refills: 12 | Status: SHIPPED | OUTPATIENT
Start: 2024-12-20

## 2024-12-20 RX ADMIN — CYANOCOBALAMIN 1000 MCG: 1000 INJECTION, SOLUTION INTRAMUSCULAR; SUBCUTANEOUS at 09:28

## 2024-12-20 NOTE — PROGRESS NOTES
Subjective   The ABCs of the Annual Wellness Visit  Medicare Wellness Visit      Dee Jorge is a 79 y.o. patient who presents for a Medicare Wellness Visit.    The following portions of the patient's history were reviewed and   updated as appropriate: allergies, current medications, past family history, past medical history, past social history, past surgical history, and problem list.    Compared to one year ago, the patient's physical   health is the same.but more fatigue  Compared to one year ago, the patient's mental   health is the same.    Recent Hospitalizations:  She was not admitted to the hospital during the last year.     Current Medical Providers:  Patient Care Team:  Kacie Loving MD as PCP - General  Kacie Loving MD as PCP - Family Medicine  Jasmin Mehta MD as Consulting Physician (Cardiology)    Outpatient Medications Prior to Visit   Medication Sig Dispense Refill    aspirin 81 MG EC tablet Take 1 tablet by mouth Daily. 100 tablet 3    carvedilol (COREG) 12.5 MG tablet TAKE 1 TABLET BY MOUTH TWICE DAILY 180 tablet 1    levothyroxine (SYNTHROID, LEVOTHROID) 88 MCG tablet TAKE 1 TABLET BY MOUTH DAILY 90 tablet 1    fluticasone (FLONASE) 50 MCG/ACT nasal spray SPRAY TWO SPRAYS IN EACH NOSTRIL ONCE DAILY 1 bottle 5    Evolocumab (Repatha SureClick) solution auto-injector SureClick injection Inject 1 mL under the skin into the appropriate area as directed Every 14 (Fourteen) Days. (Patient not taking: Reported on 12/20/2024) 6 mL 3    levalbuterol (Xopenex HFA) 45 MCG/ACT inhaler Inhale 1-2 puffs Every 4 (Four) Hours As Needed for Wheezing. (Patient not taking: Reported on 12/20/2024) 1 each 5    triamcinolone (KENALOG) 0.1 % ointment Apply 1 Application topically to the appropriate area as directed 2 (Two) Times a Day. 30 g 0    budesonide-formoterol (Symbicort) 80-4.5 MCG/ACT inhaler Inhale 2 puffs 2 (Two) Times a Day. Rinse mouth out after use. (Patient not taking: Reported on 12/20/2024)  "6.9 g 2     Facility-Administered Medications Prior to Visit   Medication Dose Route Frequency Provider Last Rate Last Admin    cyanocobalamin injection 1,000 mcg  1,000 mcg Intramuscular Q28 Days Kacie Loving MD   1,000 mcg at 12/20/24 0928     No opioid medication identified on active medication list. I have reviewed chart for other potential  high risk medication/s and harmful drug interactions in the elderly.      Aspirin is on active medication list. Aspirin use is indicated based on review of current medical condition/s. Pros and cons of this therapy have been discussed today. Benefits of this medication outweigh potential harm.  Patient has been encouraged to continue taking this medication.  .      Patient Active Problem List   Diagnosis    Anxiety    Mixed hyperlipidemia    Essential hypertension    Hypothyroidism    Pain in shoulder    Leg edema, left    Foot trauma    Age-related osteoporosis without current pathological fracture    Hearing loss    High coronary artery calcium score    Occult blood positive stool    CAD (coronary artery disease)    Nonrheumatic aortic valve insufficiency    Nonrheumatic aortic valve stenosis     Advance Care Planning Advance Directive is not on file.  ACP discussion was held with the patient during this visit. Patient has an advance directive (not in EMR), copy requested.            Objective   Vitals:    12/20/24 0822   BP: 144/84   Pulse: 92   SpO2: 96%   Weight: 51.7 kg (114 lb)   Height: 157.5 cm (62\")   PainSc: 0-No pain       Estimated body mass index is 20.85 kg/m² as calculated from the following:    Height as of this encounter: 157.5 cm (62\").    Weight as of this encounter: 51.7 kg (114 lb).    BMI is within normal parameters. No other follow-up for BMI required.           Does the patient have evidence of cognitive impairment? No  Lab Results   Component Value Date    CHLPL 156 11/18/2024    TRIG 79 11/18/2024    HDL 63 (H) 11/18/2024    LDL 78 11/18/2024    " VLDL 15 2024                                                                                                Health  Risk Assessment    Smoking Status:  Social History     Tobacco Use   Smoking Status Never    Passive exposure: Never   Smokeless Tobacco Never     Alcohol Consumption:  Social History     Substance and Sexual Activity   Alcohol Use No    Comment: daily caffiene       Fall Risk Screen  STEADI Fall Risk Assessment was completed, and patient is at LOW risk for falls.Assessment completed on:2024    Depression Screening   Little interest or pleasure in doing things? Not at all   Feeling down, depressed, or hopeless? Not at all   PHQ-2 Total Score 0      Health Habits and Functional and Cognitive Screenin/20/2024     8:23 AM   Functional & Cognitive Status   Do you have difficulty preparing food and eating? No   Do you have difficulty bathing yourself, getting dressed or grooming yourself? No   Do you have difficulty using the toilet? No   Do you have difficulty moving around from place to place? No   Do you have trouble with steps or getting out of a bed or a chair? No   Current Diet Well Balanced Diet   Dental Exam Up to date   Eye Exam Up to date   Do you need help using the phone?  No   Are you deaf or do you have serious difficulty hearing?  No   Do you need help to go to places out of walking distance? No   Do you need help shopping? No   Do you need help preparing meals?  No   Do you need help with housework?  No   Do you need help with laundry? No   Do you need help taking your medications? No   Do you need help managing money? No   Do you ever drive or ride in a car without wearing a seat belt? No   Have you felt unusual stress, anger or loneliness in the last month? No   Who do you live with? Spouse   If you need help, do you have trouble finding someone available to you? No   Have you been bothered in the last four weeks by sexual problems? No   Do you have difficulty  concentrating, remembering or making decisions? No           Age-appropriate Screening Schedule:  Refer to the list below for future screening recommendations based on patient's age, sex and/or medical conditions. Orders for these recommended tests are listed in the plan section. The patient has been provided with a written plan.    Health Maintenance List  Health Maintenance   Topic Date Due    ZOSTER VACCINE (2 of 3) 02/26/2008    RSV Vaccine - Adults (1 - 1-dose 75+ series) Never done    DXA SCAN  08/21/2019    INFLUENZA VACCINE  07/01/2024    COVID-19 Vaccine (4 - 2024-25 season) 09/01/2024    LIPID PANEL  11/18/2025    ANNUAL WELLNESS VISIT  12/20/2025    COLORECTAL CANCER SCREENING  01/22/2030    TDAP/TD VACCINES (3 - Tdap) 12/20/2034    Pneumococcal Vaccine 65+  Completed    MAMMOGRAM  Discontinued                                                                                                                                                CMS Preventative Services Quick Reference  Risk Factors Identified During Encounter  Immunizations Discussed/Encouraged: Influenza, Shingrix, COVID19, and RSV (Respiratory Syncytial Virus)    The above risks/problems have been discussed with the patient.  Pertinent information has been shared with the patient in the After Visit Summary.  An After Visit Summary and PPPS were made available to the patient.    Follow Up:   Next Medicare Wellness visit to be scheduled in 1 year.         Additional E&M Note during same encounter follows:  Patient has additional, significant, and separately identifiable condition(s)/problem(s) that require work above and beyond the Medicare Wellness Visit     Chief Complaint  Annual Exam  CPE  Cad  B12 def anemia  Hypothyroid.   Subjective   HPI  Dee is also being seen today for an annual adult preventative physical exam.  and Dee is also being seen today for additional medical problem/s.cough that started last evening. Some sinus consgestion  "as well. Has cad. She I is on repatha but ran out 2 months ago. Has not been taking asa.   She is on b12 monthly. She is euthyroid. Mild tachycardia and fairly well regulated on carvedilol.                   Objective   Vital Signs:  /84   Pulse 92   Ht 157.5 cm (62\")   Wt 51.7 kg (114 lb)   SpO2 96%   BMI 20.85 kg/m²   Physical Exam  Vitals and nursing note reviewed.   Constitutional:       Appearance: Normal appearance. She is well-developed.   HENT:      Head: Normocephalic and atraumatic.      Right Ear: Tympanic membrane and external ear normal.      Left Ear: Tympanic membrane and external ear normal.      Nose: Nose normal.      Mouth/Throat:      Mouth: Mucous membranes are moist.   Eyes:      Extraocular Movements: Extraocular movements intact.      Pupils: Pupils are equal, round, and reactive to light.   Cardiovascular:      Rate and Rhythm: Normal rate and regular rhythm.      Pulses: Normal pulses.      Heart sounds: Normal heart sounds.   Pulmonary:      Effort: Pulmonary effort is normal. No respiratory distress.      Breath sounds: Normal breath sounds.   Abdominal:      General: Abdomen is flat.      Palpations: Abdomen is soft.   Musculoskeletal:         General: Normal range of motion.      Cervical back: Normal range of motion and neck supple.   Skin:     General: Skin is warm and dry.   Neurological:      General: No focal deficit present.      Mental Status: She is alert and oriented to person, place, and time.   Psychiatric:         Mood and Affect: Mood normal.         Behavior: Behavior normal.         Thought Content: Thought content normal.         Judgment: Judgment normal.         The following data was reviewed by: Kacie Loving MD on 12/20/2024:  Data reviewed : Cardiology studies consult  Common labs          6/3/2024    11:09 9/6/2024    09:33 11/18/2024    08:46   Common Labs   Glucose   105    BUN   16    Creatinine   0.58    Sodium   140    Potassium   3.8    Chloride "   105    Calcium   9.4    Total Protein   6.1    Albumin   4.0    Total Bilirubin   0.5    Alkaline Phosphatase   91    AST (SGOT)   23    ALT (SGPT)   12    WBC 7.31  8.16  8.53    Hemoglobin 10.5  10.7  10.6    Hematocrit 32.9  32.5  32.4    Platelets 363  375  398    Total Cholesterol   156    Triglycerides   79    HDL Cholesterol   63    LDL Cholesterol    78              Assessment and Plan            Wound of left lower extremity, initial encounter    Orders:    Td Vaccine => 6yo PF (TDVAX) 2-2    Abrasion, left ankle, initial encounter    Orders:    Td Vaccine => 6yo PF (TDVAX) 2-2    Mixed hyperlipidemia            Coronary artery disease due to lipid rich plaque           Upper respiratory tract infection, unspecified type    Orders:    guaiFENesin-codeine (ROMILAR-AC) 100-10 MG/5ML solution/syrup; Take 5 mL by mouth 3 (Three) Times a Day As Needed for Cough or Congestion.    Essential hypertension           Acquired hypothyroidism         Healthcare maintenance    Hypothryoid- tsh wnl in November. Continue synthroid. Will monitor levels.   Cad/ hyperlip- off repatha related to cost. Sample given. She may apply for patient assistance program next year if needed and discussed this with the patient.   URI- gave ipratoprium nasal, fluticasone, tessalon perles, and cheratussin. Caution given regarding codeine usage. F/u if worsens of fails to improve. Paper rx for azitrhomycin for symptoms if needed. Inhaler prn.                I spent 48 minutes caring for Dee on this date of service. This time includes time spent by me in the following activities:preparing for the visit, reviewing tests, obtaining and/or reviewing a separately obtained history, performing a medically appropriate examination and/or evaluation , counseling and educating the patient/family/caregiver, ordering medications, tests, or procedures, referring and communicating with other health care professionals , documenting information in the  medical record, and independently interpreting results and communicating that information with the patient/family/caregiver  Follow Up   No follow-ups on file.  Patient was given instructions and counseling regarding her condition or for health maintenance advice. Please see specific information pulled into the AVS if appropriate.

## 2025-01-09 ENCOUNTER — TELEPHONE (OUTPATIENT)
Dept: CARDIOLOGY | Facility: CLINIC | Age: 80
End: 2025-01-09

## 2025-01-09 NOTE — TELEPHONE ENCOUNTER
Hub staff attempted to follow warm transfer process and was unsuccessful     Caller: Dee Jorge    Relationship to patient: Self    Best call back number: [phone]    Patient is needing: PT IS CALLING BACK TO RESCHEDULE APPT ON 1/20/25. SHE WANTED TO KNOW IF HER ECHO TEST WOULD ALSO BE PUSHED BACK? TRIED TO WT BUT WAS ON HOLD FOR OVER A MINUTE. PLEASE CALL PT

## 2025-01-10 NOTE — TELEPHONE ENCOUNTER
Her echo appears to be scheduled after she sees me. They can be on the same day or she can space them out

## 2025-01-20 ENCOUNTER — CLINICAL SUPPORT (OUTPATIENT)
Dept: INTERNAL MEDICINE | Facility: CLINIC | Age: 80
End: 2025-01-20
Payer: MEDICARE

## 2025-01-20 DIAGNOSIS — E53.8 B12 DEFICIENCY: Primary | ICD-10-CM

## 2025-01-20 PROCEDURE — 96372 THER/PROPH/DIAG INJ SC/IM: CPT | Performed by: INTERNAL MEDICINE

## 2025-01-20 RX ADMIN — CYANOCOBALAMIN 1000 MCG: 1000 INJECTION, SOLUTION INTRAMUSCULAR; SUBCUTANEOUS at 11:15

## 2025-01-24 ENCOUNTER — TELEPHONE (OUTPATIENT)
Dept: INTERNAL MEDICINE | Facility: CLINIC | Age: 80
End: 2025-01-24
Payer: MEDICARE

## 2025-01-24 RX ORDER — MELOXICAM 7.5 MG/1
7.5 TABLET ORAL DAILY
Qty: 30 TABLET | Refills: 6 | Status: SHIPPED | OUTPATIENT
Start: 2025-01-24

## 2025-01-24 NOTE — TELEPHONE ENCOUNTER
----- Message from Kacie Loving sent at 1/24/2025  5:05 PM EST -----  Rx for meloxicam sent to her pharmacy. Take one tablet daily. Do not marlen ibuprofen or alleve w this med.   JW

## 2025-01-27 ENCOUNTER — OFFICE VISIT (OUTPATIENT)
Dept: CARDIOLOGY | Facility: CLINIC | Age: 80
End: 2025-01-27
Payer: MEDICARE

## 2025-01-27 VITALS
HEIGHT: 62 IN | BODY MASS INDEX: 21.97 KG/M2 | HEART RATE: 92 BPM | OXYGEN SATURATION: 98 % | WEIGHT: 119.4 LBS | SYSTOLIC BLOOD PRESSURE: 140 MMHG | DIASTOLIC BLOOD PRESSURE: 90 MMHG

## 2025-01-27 DIAGNOSIS — I35.1 NONRHEUMATIC AORTIC VALVE INSUFFICIENCY: ICD-10-CM

## 2025-01-27 DIAGNOSIS — Z78.9 STATIN INTOLERANCE: ICD-10-CM

## 2025-01-27 DIAGNOSIS — R93.1 HIGH CORONARY ARTERY CALCIUM SCORE: Primary | ICD-10-CM

## 2025-01-27 DIAGNOSIS — I35.0 NONRHEUMATIC AORTIC VALVE STENOSIS: ICD-10-CM

## 2025-01-27 DIAGNOSIS — I10 ESSENTIAL HYPERTENSION: ICD-10-CM

## 2025-01-27 PROCEDURE — 93000 ELECTROCARDIOGRAM COMPLETE: CPT | Performed by: NURSE PRACTITIONER

## 2025-01-27 PROCEDURE — 3080F DIAST BP >= 90 MM HG: CPT | Performed by: NURSE PRACTITIONER

## 2025-01-27 PROCEDURE — 3077F SYST BP >= 140 MM HG: CPT | Performed by: NURSE PRACTITIONER

## 2025-01-27 PROCEDURE — 99214 OFFICE O/P EST MOD 30 MIN: CPT | Performed by: NURSE PRACTITIONER

## 2025-01-27 NOTE — PROGRESS NOTES
Date of Office Visit: 25  Encounter Provider: KYM Ferris  Place of Service: [facility] CARDIOLOGY  Patient Name: Dee Jorge  :1945    Chief Complaint   Patient presents with    Shortness of Breath    Follow-up   :     HPI: Dee Jorge is a 80 y.o. female  with hypertension, hyperlipidemia with statin intolerance, coronary artery calcification, aortic valve stenoses and aortic valve insufficiency and hypothyroidism.      She is followed by Dr. Jasmin Mehta.  I will visit with her for the first time and have reviewed her medical record.    In 2018 she had a coronary calcium score of 404 mainly involving the LAD.    Patient had echocardiogram 2023 showing normal left ventricular systolic function, moderate to severe to valve stenoses, moderate aortic valve regurgitation, mild concentric hypertrophy, grade 1 diastolic dysfunction and normal RVSP.     She presents today for annual reassessment. She has shortness of breath with long walks but states that is not changed since last year.  She typically walks the dog 2 miles.  She got back on Repatha but then when the price went up she stopped it.  She has no chest pain or swelling or dizziness or palpitation.    Allergies   Allergen Reactions    Statins Myalgia    Codeine Nausea And Vomiting    Penicillins Hives    Sulfa Antibiotics Nausea And Vomiting and Other (See Comments)     HEADACHES           Family and social history reviewed.     ROS  All other systems were reviewed and are negative          Objective:     There were no vitals filed for this visit.  There is no height or weight on file to calculate BMI.    PHYSICAL EXAM:  Cardiovascular:      Normal rate. Regular rhythm.      Murmurs: There is a grade 4/6 high frequency blowing systolic murmur.           ECG 12 Lead    Date/Time: 2025 4:32 PM  Performed by: Marcia Ordoñez APRN    Authorized by: Marcia Ordoñez APRN  Comparison: compared with  previous ECG   Similar to previous ECG  Rhythm: sinus rhythm  Rate: normal  QRS axis: normal            Current Outpatient Medications   Medication Sig Dispense Refill    aspirin 81 MG EC tablet Take 1 tablet by mouth Daily. 100 tablet 3    benzonatate (Tessalon Perles) 100 MG capsule Take 1 capsule by mouth 3 (Three) Times a Day As Needed for Cough. 30 capsule 3    carvedilol (COREG) 12.5 MG tablet TAKE 1 TABLET BY MOUTH TWICE DAILY 180 tablet 1    fluticasone (FLONASE) 50 MCG/ACT nasal spray Administer 2 sprays into the nostril(s) as directed by provider Daily. 16 g 5    guaiFENesin-codeine (ROMILAR-AC) 100-10 MG/5ML solution/syrup Take 5 mL by mouth 3 (Three) Times a Day As Needed for Cough or Congestion. 125 mL 0    ipratropium (ATROVENT) 0.06 % nasal spray Administer 2 sprays into the nostril(s) as directed by provider 3 (Three) Times a Day. 15 mL 12    levothyroxine (SYNTHROID, LEVOTHROID) 88 MCG tablet TAKE 1 TABLET BY MOUTH DAILY 90 tablet 1    meloxicam (Mobic) 7.5 MG tablet Take 1 tablet by mouth Daily. 30 tablet 6    Evolocumab (Repatha SureClick) solution auto-injector SureClick injection Inject 1 mL under the skin into the appropriate area as directed Every 14 (Fourteen) Days. (Patient not taking: Reported on 1/27/2025) 6 mL 3    levalbuterol (Xopenex HFA) 45 MCG/ACT inhaler Inhale 1-2 puffs Every 4 (Four) Hours As Needed for Wheezing. (Patient not taking: Reported on 1/27/2025) 1 each 5    triamcinolone (KENALOG) 0.1 % ointment Apply 1 Application topically to the appropriate area as directed 2 (Two) Times a Day. 30 g 0     Current Facility-Administered Medications   Medication Dose Route Frequency Provider Last Rate Last Admin    cyanocobalamin injection 1,000 mcg  1,000 mcg Intramuscular Q28 Days Kacie Loving MD   1,000 mcg at 01/20/25 1115     Assessment:      No diagnosis found.     No orders of the defined types were placed in this encounter.        Plan:   1.  80-year-old female coronary  artery disease/coronary artery calcification with a calcium score of 404 in 2018.  She is taking aspirin every day and is not on statin therapy.  She is currently not on Repatha but plans to get back on it.  2.  Moderate to severe aortic valve stenosis with moderate insufficiency on echo 2023.  She is scheduled for repeat echo in a little over a week.  I discussed with her meeting with our structural heart team for TAVR eval and she is agreeable with this plan.  I have set a reminder to review her echo once that results 2/3/2025 and will help to coordinate TAVR consultation thereafter.  3.  Hyperlipidemia intolerant to multiple statins.  Zetia caused diarrhea.  She had good response with Repatha but stopped it because it was expensive.  She plans to get back on Repatha now that is the beginning of the year and she is not in the donut hole  4.  Hypertension-blood pressure appears stable  5.  Hypothyroidism replacement therapy    Further recommendation to be made pending results of echo and TAVR evaluation        It has been a pleasure to participate in this patient's care.      Thank you,  KYM Ferris      **I used Dragon to dictate this note:**

## 2025-02-03 ENCOUNTER — TELEPHONE (OUTPATIENT)
Dept: CARDIOLOGY | Facility: CLINIC | Age: 80
End: 2025-02-03
Payer: MEDICARE

## 2025-02-03 ENCOUNTER — TELEPHONE (OUTPATIENT)
Dept: CARDIOLOGY | Facility: HOSPITAL | Age: 80
End: 2025-02-03
Payer: MEDICARE

## 2025-02-03 ENCOUNTER — HOSPITAL ENCOUNTER (OUTPATIENT)
Dept: CARDIOLOGY | Facility: HOSPITAL | Age: 80
Discharge: HOME | End: 2025-02-03
Admitting: INTERNAL MEDICINE
Payer: MEDICARE

## 2025-02-03 VITALS
HEIGHT: 62 IN | DIASTOLIC BLOOD PRESSURE: 102 MMHG | SYSTOLIC BLOOD PRESSURE: 160 MMHG | BODY MASS INDEX: 21.9 KG/M2 | HEART RATE: 82 BPM | WEIGHT: 119 LBS

## 2025-02-03 DIAGNOSIS — R93.1 HIGH CORONARY ARTERY CALCIUM SCORE: ICD-10-CM

## 2025-02-03 DIAGNOSIS — I35.1 NONRHEUMATIC AORTIC VALVE INSUFFICIENCY: ICD-10-CM

## 2025-02-03 DIAGNOSIS — I35.0 NONRHEUMATIC AORTIC VALVE STENOSIS: ICD-10-CM

## 2025-02-03 DIAGNOSIS — I10 ESSENTIAL HYPERTENSION: ICD-10-CM

## 2025-02-03 LAB
AORTIC ARCH: 2.7 CM
AORTIC DIMENSIONLESS INDEX: 0.1 (DI)
ASCENDING AORTA: 3.4 CM
AV MEAN PRESS GRAD SYS DOP V1V2: 44.2 MMHG
AV VMAX SYS DOP: 437 CM/SEC
BH CV ECHO LEFT VENTRICLE GLOBAL LONGITUDINAL STRAIN: -19.8 %
BH CV ECHO MEAS - ACS: 0.68 CM
BH CV ECHO MEAS - AI P1/2T: 783.4 MSEC
BH CV ECHO MEAS - AO MAX PG: 76.4 MMHG
BH CV ECHO MEAS - AO ROOT DIAM: 3.3 CM
BH CV ECHO MEAS - AO V2 VTI: 99.4 CM
BH CV ECHO MEAS - AVA(I,D): 0.6 CM2
BH CV ECHO MEAS - EDV(CUBED): 85.2 ML
BH CV ECHO MEAS - EDV(MOD-SP2): 91 ML
BH CV ECHO MEAS - EDV(MOD-SP4): 93 ML
BH CV ECHO MEAS - EF(MOD-SP2): 61.5 %
BH CV ECHO MEAS - EF(MOD-SP4): 60.2 %
BH CV ECHO MEAS - ESV(CUBED): 26.2 ML
BH CV ECHO MEAS - ESV(MOD-SP2): 35 ML
BH CV ECHO MEAS - ESV(MOD-SP4): 37 ML
BH CV ECHO MEAS - FS: 32.5 %
BH CV ECHO MEAS - IVS/LVPW: 1 CM
BH CV ECHO MEAS - IVSD: 1.3 CM
BH CV ECHO MEAS - LAT PEAK E' VEL: 5.2 CM/SEC
BH CV ECHO MEAS - LV DIASTOLIC VOL/BSA (35-75): 60.7 CM2
BH CV ECHO MEAS - LV MASS(C)D: 215.1 GRAMS
BH CV ECHO MEAS - LV MAX PG: 2.8 MMHG
BH CV ECHO MEAS - LV MEAN PG: 1 MMHG
BH CV ECHO MEAS - LV SYSTOLIC VOL/BSA (12-30): 24.1 CM2
BH CV ECHO MEAS - LV V1 MAX: 83.6 CM/SEC
BH CV ECHO MEAS - LV V1 VTI: 18.2 CM
BH CV ECHO MEAS - LVIDD: 4.4 CM
BH CV ECHO MEAS - LVIDS: 3 CM
BH CV ECHO MEAS - LVOT AREA: 3.3 CM2
BH CV ECHO MEAS - LVOT DIAM: 2.05 CM
BH CV ECHO MEAS - LVPWD: 1.3 CM
BH CV ECHO MEAS - MED PEAK E' VEL: 4.2 CM/SEC
BH CV ECHO MEAS - MR MAX PG: 135.6 MMHG
BH CV ECHO MEAS - MR MAX VEL: 582.2 CM/SEC
BH CV ECHO MEAS - MV A DUR: 0.12 SEC
BH CV ECHO MEAS - MV A MAX VEL: 112 CM/SEC
BH CV ECHO MEAS - MV DEC SLOPE: 405.6 CM/SEC2
BH CV ECHO MEAS - MV DEC TIME: 0.15 SEC
BH CV ECHO MEAS - MV E MAX VEL: 68.1 CM/SEC
BH CV ECHO MEAS - MV E/A: 0.61
BH CV ECHO MEAS - MV MAX PG: 6 MMHG
BH CV ECHO MEAS - MV MEAN PG: 3.5 MMHG
BH CV ECHO MEAS - MV P1/2T: 57.7 MSEC
BH CV ECHO MEAS - MV V2 VTI: 21.4 CM
BH CV ECHO MEAS - MVA(P1/2T): 3.8 CM2
BH CV ECHO MEAS - MVA(VTI): 2.8 CM2
BH CV ECHO MEAS - PA ACC TIME: 0.15 SEC
BH CV ECHO MEAS - PA V2 MAX: 103.2 CM/SEC
BH CV ECHO MEAS - PULM A REVS DUR: 0.11 SEC
BH CV ECHO MEAS - PULM A REVS VEL: 23.6 CM/SEC
BH CV ECHO MEAS - PULM DIAS VEL: 34.7 CM/SEC
BH CV ECHO MEAS - PULM S/D: 1.35
BH CV ECHO MEAS - PULM SYS VEL: 46.7 CM/SEC
BH CV ECHO MEAS - QP/QS: 0.54
BH CV ECHO MEAS - RAP SYSTOLE: 8 MMHG
BH CV ECHO MEAS - RV MAX PG: 1.08 MMHG
BH CV ECHO MEAS - RV V1 MAX: 51.8 CM/SEC
BH CV ECHO MEAS - RV V1 VTI: 10.1 CM
BH CV ECHO MEAS - RVOT DIAM: 2.02 CM
BH CV ECHO MEAS - RVSP: 27.9 MMHG
BH CV ECHO MEAS - SUP REN AO DIAM: 2.2 CM
BH CV ECHO MEAS - SV(LVOT): 60.1 ML
BH CV ECHO MEAS - SV(MOD-SP2): 56 ML
BH CV ECHO MEAS - SV(MOD-SP4): 56 ML
BH CV ECHO MEAS - SV(RVOT): 32.5 ML
BH CV ECHO MEAS - SVI(LVOT): 39.2 ML/M2
BH CV ECHO MEAS - SVI(MOD-SP2): 36.5 ML/M2
BH CV ECHO MEAS - SVI(MOD-SP4): 36.5 ML/M2
BH CV ECHO MEAS - TAPSE (>1.6): 1.75 CM
BH CV ECHO MEAS - TR MAX PG: 19.9 MMHG
BH CV ECHO MEAS - TR MAX VEL: 223.2 CM/SEC
BH CV ECHO MEASUREMENTS AVERAGE E/E' RATIO: 14.49
BH CV XLRA - RV BASE: 2.03 CM
BH CV XLRA - RV LENGTH: 6.1 CM
BH CV XLRA - RV MID: 1.22 CM
BH CV XLRA - TDI S': 15.9 CM/SEC
LEFT ATRIUM VOLUME INDEX: 38.8 ML/M2
LV EF BIPLANE MOD: 61 %
SINUS: 2.9 CM
STJ: 3 CM

## 2025-02-03 PROCEDURE — 93356 MYOCRD STRAIN IMG SPCKL TRCK: CPT

## 2025-02-03 PROCEDURE — 93306 TTE W/DOPPLER COMPLETE: CPT

## 2025-02-03 PROCEDURE — 93306 TTE W/DOPPLER COMPLETE: CPT | Performed by: INTERNAL MEDICINE

## 2025-02-03 PROCEDURE — 93356 MYOCRD STRAIN IMG SPCKL TRCK: CPT | Performed by: INTERNAL MEDICINE

## 2025-02-03 RX ORDER — LEVOTHYROXINE SODIUM 88 UG/1
88 TABLET ORAL DAILY
Qty: 90 TABLET | Refills: 1 | Status: SHIPPED | OUTPATIENT
Start: 2025-02-03

## 2025-02-03 NOTE — TELEPHONE ENCOUNTER
I left a message asking Ms Nielsenes to return my call to discuss the structural heart programs evaluation process

## 2025-02-03 NOTE — TELEPHONE ENCOUNTER
I spoke with patient and discussed severe arctic valve stenosis with preserved LV systolic function.  She is agreeable to meet with our structural heart team to discuss TAVR evaluation.  I sent a message to our structural heart team, nurse navigator to help coordinate an appointment.  Patient was appreciative of my call.

## 2025-02-10 ENCOUNTER — TELEPHONE (OUTPATIENT)
Dept: CARDIOLOGY | Facility: HOSPITAL | Age: 80
End: 2025-02-10
Payer: MEDICARE

## 2025-02-10 NOTE — TELEPHONE ENCOUNTER
I have left Ms Jorge a message asking her to return my call to discuss the structural heart program and the evaluation process We would like to set up an appointment with Dr Peralta for an initial evaluation

## 2025-02-24 ENCOUNTER — TELEPHONE (OUTPATIENT)
Dept: CARDIOLOGY | Facility: HOSPITAL | Age: 80
End: 2025-02-24
Payer: MEDICARE

## 2025-02-24 NOTE — TELEPHONE ENCOUNTER
I have spoken with Mrs Jorge Apparently she did not receive any messages and her phone doesn't ring but goes straight to voicemail She now has my contact number and will call me directly with questions or concerns. We have discussed aortic stenosis in detail She has researched this as well I introduced the structural heart program and we discussed the evaluation process She is agreeable to seeing Dr Peralta in office and this appointment will be arranged soon Tomorrow was offered to her but she has other appointments scheduled and can't come. I mailed her our introductory packet including information on shared decision making. She has been encouraged to call with any further questions or concerns. We will work on scheduling an appointment with Dr Peralta as soon as possible

## 2025-02-24 NOTE — TELEPHONE ENCOUNTER
I left a voicemail asking Mrs Jorge to call me to discuss the structural heart evaluation process and arranging an appointment with Dr Peralta

## 2025-02-25 ENCOUNTER — CLINICAL SUPPORT (OUTPATIENT)
Dept: INTERNAL MEDICINE | Facility: CLINIC | Age: 80
End: 2025-02-25
Payer: MEDICARE

## 2025-02-25 ENCOUNTER — OFFICE VISIT (OUTPATIENT)
Dept: CARDIOLOGY | Facility: CLINIC | Age: 80
End: 2025-02-25
Payer: MEDICARE

## 2025-02-25 VITALS
HEART RATE: 94 BPM | WEIGHT: 120 LBS | SYSTOLIC BLOOD PRESSURE: 148 MMHG | HEIGHT: 62 IN | DIASTOLIC BLOOD PRESSURE: 82 MMHG | BODY MASS INDEX: 22.08 KG/M2

## 2025-02-25 DIAGNOSIS — I35.0 NONRHEUMATIC AORTIC VALVE STENOSIS: Primary | ICD-10-CM

## 2025-02-25 DIAGNOSIS — I25.10 ATHEROSCLEROSIS OF NATIVE CORONARY ARTERY OF NATIVE HEART WITHOUT ANGINA PECTORIS: ICD-10-CM

## 2025-02-25 DIAGNOSIS — E53.8 B12 DEFICIENCY: Primary | ICD-10-CM

## 2025-02-25 PROCEDURE — 96372 THER/PROPH/DIAG INJ SC/IM: CPT | Performed by: INTERNAL MEDICINE

## 2025-02-25 RX ADMIN — CYANOCOBALAMIN 1000 MCG: 1000 INJECTION, SOLUTION INTRAMUSCULAR; SUBCUTANEOUS at 14:47

## 2025-02-25 NOTE — PROGRESS NOTES
Date of Office Visit: 25  Encounter Provider: Andrew Peralta MD  Place of Service: [facility] CARDIOLOGY  Patient Name: Dee Jorge  :1945  [id number]    Chief Complaint   Patient presents with    TAVR Consult        HPI: Dee Jorge is a 80 y.o. female is a patient who sees Dr. Jasmin Mehta and has for a number of years she has aortic stenosis is seen big is trileaflet and degenerative.  Her last echo shows severe aortic stenosis with a peak of 76 and a mean of 44.  She comes to see us today for a TAVR evaluation.  She is a little bit more fatigued than she used to be she is also has a little bit more dyspnea on exertion than she used to have she in general feels like she is okay for the most part she has not had chest pain, PND orthopnea edema syncope or palpitations.  She has had a little bit of asthma she has also been anemic chronically for unclear reasons it sounds like.  She does not have any bleeding difficulty.  She has a little bit of mild coronary disease on a CT calcium score is intolerant of statins was placed on a PSK 9 inhibitor but had to stop it because it is too expensive for her.    Past Medical History:   Diagnosis Date    Abnormal electrocardiogram (ECG) (EKG) 2018    Actinic keratosis     Adnexal mass 2008    RIGHT    Anemia     Anxiety     Aortic regurgitation     Arthritis     Asthma     Benign neoplasm of choroid 2015    Bronchitis     CAD (coronary artery disease)     Cataract 10/05/2016    BILATERAL    Constipation     Cystocele     Fatigue     Foot trauma, left, initial encounter 2017    Fracture of patella     Hearing loss     Hypothyroidism     ACQUIRED    Insomnia     Mastodynia 2015    OA (osteoarthritis)     Osteoporosis     Patella fracture 2010    LEFT    Positive occult stool blood test 10/04/2019    Postmenopausal     Rectocele     Right shoulder strain 2002    D/T FALL, SEEN AT [facility] ER    Rotator  cuff tear, right 12/05/2014    SAW DR. CHARLES BARTLETT    Seborrheic keratosis     Trigger finger, left middle finger 02/2016       Past Surgical History:   Procedure Laterality Date    ANTERIOR AND POSTERIOR VAGINAL REPAIR N/A 09/21/2011    RECTOCELE AND CYSTOCELE REPAIR, PLACEMENT OF PROLIFT GRAFT, DR. LISA PALM AT [facility]    BUNIONECTOMY Left 08/02/2010    LEFT MEDIAL EXTRA-ARTICULAR GANGLION CYST REMOVAL AND FIRST MTP CHILECTOMY WITH SYNOVECTOMY, DR. BHAVANI BABCOCK AT [facility]    CHOLECYSTECTOMY N/A 1992    DR. QUINCY CEE    COLONOSCOPY N/A 09/18/2015    ENTIRE COLON WNL, RESCOPE IN 10 YRS, DR. QUINCY VILLARREAL AT [place]    COLONOSCOPY N/A 07/20/2005    WNL, DR. MAY VAZQUEZ AT [facility]    COLONOSCOPY N/A 1/22/2020    ENTIRE COLON WNL, RESCOPE IN 10 YRS, DR. CHINO MONROE AT [facility]    HERNIA REPAIR Bilateral 1972    DR. AIDEN ARZATE    HYSTERECTOMY N/A 1984    DR. ZEKE BLANDON    KNEE ARTHROSCOPY Right     KNEE CARTILAGE SURGERY Right 08/21/2012    RIGHT CHONDROPLASTY OF PATELLA AND TROCHLEA, CHONDROPLASTY MEDIAL FEMORAL CONDYLE, CHONDROPLASTY LATERAL TIBIAL PLATEAU AND LATERAL FEMORAL CONDYLE, SUBTOTAL LATERAL MENISCECTOMY, OSTEOPLASTY ANTERIOR TIBIAL INTERCONDYLAR NOTCH, DR. BHAVANI BABCOCK AT [facility]    NASAL ENDOSCOPY N/A 03/11/2015    ANTERIOR RHINOSCOPY, CONGESTION OF NASAL MUCOSA, DR. BRENDAN CHAMPION    SALPINGO OOPHORECTOMY Right 07/30/2008    FOR ADNEXAL MASS, DR. LISA PALM AT [facility]    TOTAL HIP ARTHROPLASTY Left 05/06/2013    DR.REID PALM AT [facility]    TOTAL HIP ARTHROPLASTY Right 10/13/2008    DR. KELVIN TARIQ AT [place]    TOTAL KNEE ARTHROPLASTY Left 01/20/2014    DR. GORDO PALM AT [facility]       Social History     Socioeconomic History    Marital status:    Tobacco Use    Smoking status: Never     Passive exposure: Never    Smokeless tobacco: Never   Vaping Use    Vaping status: Never Used   Substance and Sexual Activity    Alcohol use: No     Comment: daily caffiene    Drug use: No    Sexual activity: Defer      Birth control/protection: Post-menopausal, Surgical       Family History   Problem Relation Age of Onset    Hypertension Mother     Thyroid disease Mother     Stroke Mother     Asthma Mother     Asthma Father     Emphysema Father     Alcohol abuse Father     Hypertension Father     COPD Father     Thyroid disease Daughter     Diabetes type I Daughter     Diabetes Daughter     Stroke Maternal Grandfather         ischemic    Kidney disease Sister     Hypertension Sister     [place]'s disease Grandchild     Asthma Grandchild     Breast cancer Neg Hx        Review of Systems   Constitutional: Negative for decreased appetite, fever, malaise/fatigue and weight loss.   HENT:  Negative for nosebleeds.    Eyes:  Negative for double vision.   Cardiovascular:  Negative for chest pain, claudication, cyanosis, dyspnea on exertion, irregular heartbeat, leg swelling, near-syncope, orthopnea, palpitations, paroxysmal nocturnal dyspnea and syncope.   Respiratory:  Negative for cough, hemoptysis and shortness of breath.    Hematologic/Lymphatic: Negative for bleeding problem.   Skin:  Negative for rash.   Musculoskeletal:  Negative for falls and myalgias.   Gastrointestinal:  Negative for hematochezia, jaundice, melena, nausea and vomiting.   Genitourinary:  Negative for hematuria.   Neurological:  Negative for dizziness and seizures.   Psychiatric/Behavioral:  Negative for altered mental status and memory loss.        Allergies   Allergen Reactions    Statins Myalgia    Codeine Nausea And Vomiting    Penicillins Hives    Sulfa Antibiotics Nausea And Vomiting and Other (See Comments)     HEADACHES         Current Outpatient Medications:     aspirin 81 MG EC tablet, Take 1 tablet by mouth Daily., Disp: 100 tablet, Rfl: 3    carvedilol (COREG) 12.5 MG tablet, TAKE 1 TABLET BY MOUTH TWICE DAILY, Disp: 180 tablet, Rfl: 1    levothyroxine (SYNTHROID, LEVOTHROID) 88 MCG tablet, TAKE 1 TABLET BY MOUTH DAILY, Disp: 90 tablet, Rfl: 1     "Evolocumab (Repatha SureClick) solution auto-injector SureClick injection, Inject 1 mL under the skin into the appropriate area as directed Every 14 (Fourteen) Days. (Patient not taking: Reported on 12/20/2024), Disp: 6 mL, Rfl: 3    fluticasone (FLONASE) 50 MCG/ACT nasal spray, Administer 2 sprays into the nostril(s) as directed by provider Daily. (Patient not taking: Reported on 2/25/2025), Disp: 16 g, Rfl: 5    guaiFENesin-codeine (ROMILAR-AC) 100-10 MG/5ML solution/syrup, Take 5 mL by mouth 3 (Three) Times a Day As Needed for Cough or Congestion. (Patient not taking: Reported on 2/25/2025), Disp: 125 mL, Rfl: 0    ipratropium (ATROVENT) 0.06 % nasal spray, Administer 2 sprays into the nostril(s) as directed by provider 3 (Three) Times a Day. (Patient not taking: Reported on 2/25/2025), Disp: 15 mL, Rfl: 12    levalbuterol (Xopenex HFA) 45 MCG/ACT inhaler, Inhale 1-2 puffs Every 4 (Four) Hours As Needed for Wheezing. (Patient not taking: Reported on 12/20/2024), Disp: 1 each, Rfl: 5    meloxicam (Mobic) 7.5 MG tablet, Take 1 tablet by mouth Daily. (Patient not taking: Reported on 2/25/2025), Disp: 30 tablet, Rfl: 6    triamcinolone (KENALOG) 0.1 % ointment, Apply 1 Application topically to the appropriate area as directed 2 (Two) Times a Day. (Patient not taking: Reported on 2/25/2025), Disp: 30 g, Rfl: 0    Current Facility-Administered Medications:     cyanocobalamin injection 1,000 mcg, 1,000 mcg, Intramuscular, Q28 Days, Kacie Loving MD, 1,000 mcg at 01/20/25 1115      Objective:     Vitals:    02/25/25 1333   BP: 148/82   BP Location: Left arm   Patient Position: Sitting   Pulse: 94   Weight: 54.4 kg (120 lb)   Height: 157.5 cm (62\")     Body mass index is 21.95 kg/m².    Constitutional:       Appearance: Well-developed.   Eyes:      General: No scleral icterus.  HENT:      Head: Normocephalic.   Neck:      Thyroid: No thyromegaly.      Vascular: No JVD.      Lymphadenopathy: No cervical adenopathy. "   Pulmonary:      Effort: Pulmonary effort is normal.      Breath sounds: Normal breath sounds. No wheezing. No rales.   Cardiovascular:      Normal rate. Regular rhythm.      Murmurs: There is a harsh midsystolic murmur at the URSB, radiating to the neck.      No gallop.    Edema:     Peripheral edema absent.   Abdominal:      Palpations: Abdomen is soft.      Tenderness: There is no abdominal tenderness.   Musculoskeletal: Normal range of motion. Skin:     General: Skin is warm and dry.      Findings: No rash.   Neurological:      Mental Status: Alert and oriented to person, place, and time.           ECG 12 Lead    Date/Time: 2/25/2025 1:40 PM  Performed by: Andrew Peralta MD    Authorized by: Andrew Peralta MD  Comparison: compared with previous ECG   Similar to previous ECG  Rhythm: sinus rhythm  Other findings: left ventricular hypertrophy with strain    Clinical impression: abnormal EKG           Assessment:       Diagnosis Plan   1. Nonrheumatic aortic valve stenosis  Case Request Cath Lab: Left Heart Cath    Lipoprotein A (LPA)    Apolipoprotein B    CT Angio TAVR Chest Abdomen Pelvis      2. Atherosclerosis of native coronary artery of native heart without angina pectoris  Lipoprotein A (LPA)    Apolipoprotein B             Plan:       While she has severe aortic stenosis I think she is symptomatic with that and even if she was not I would recommend that we do something about it at this point.  She is got LVH with strain on her ECG also.  I spent a long time discussing with her and her 2 daughters the natural history of aortic stenosis and then options which I think at her age and her frailty her only real option would be a TAVR.  I talked with about the risk and benefits of a TAVR including the risk of death stroke pacemaker bleeding blood vessel damage.  They would like to move forward with that I am and to have him get set up to get a TAVR CT angiogram and I will do a heart cath on her on March 13  and then we should be set and ready to go to do her TAVR after that hopefully    No follow-ups on file.     As always, it has been a pleasure to participate in your patient's care.      Sincerely,       Andrew Peralta MD

## 2025-02-25 NOTE — H&P (VIEW-ONLY)
Date of Office Visit: 25  Encounter Provider: Andrew Peralta MD  Place of Service: [facility] CARDIOLOGY  Patient Name: Dee Jorge  :1945  [id number]    Chief Complaint   Patient presents with    TAVR Consult        HPI: Dee Jorge is a 80 y.o. female is a patient who sees Dr. Jasmin Mehta and has for a number of years she has aortic stenosis is seen big is trileaflet and degenerative.  Her last echo shows severe aortic stenosis with a peak of 76 and a mean of 44.  She comes to see us today for a TAVR evaluation.  She is a little bit more fatigued than she used to be she is also has a little bit more dyspnea on exertion than she used to have she in general feels like she is okay for the most part she has not had chest pain, PND orthopnea edema syncope or palpitations.  She has had a little bit of asthma she has also been anemic chronically for unclear reasons it sounds like.  She does not have any bleeding difficulty.  She has a little bit of mild coronary disease on a CT calcium score is intolerant of statins was placed on a PSK 9 inhibitor but had to stop it because it is too expensive for her.    Past Medical History:   Diagnosis Date    Abnormal electrocardiogram (ECG) (EKG) 2018    Actinic keratosis     Adnexal mass 2008    RIGHT    Anemia     Anxiety     Aortic regurgitation     Arthritis     Asthma     Benign neoplasm of choroid 2015    Bronchitis     CAD (coronary artery disease)     Cataract 10/05/2016    BILATERAL    Constipation     Cystocele     Fatigue     Foot trauma, left, initial encounter 2017    Fracture of patella     Hearing loss     Hypothyroidism     ACQUIRED    Insomnia     Mastodynia 2015    OA (osteoarthritis)     Osteoporosis     Patella fracture 2010    LEFT    Positive occult stool blood test 10/04/2019    Postmenopausal     Rectocele     Right shoulder strain 2002    D/T FALL, SEEN AT [facility] ER    Rotator  cuff tear, right 12/05/2014    SAW DR. CHARLES BARTLETT    Seborrheic keratosis     Trigger finger, left middle finger 02/2016       Past Surgical History:   Procedure Laterality Date    ANTERIOR AND POSTERIOR VAGINAL REPAIR N/A 09/21/2011    RECTOCELE AND CYSTOCELE REPAIR, PLACEMENT OF PROLIFT GRAFT, DR. LISA PALM AT [facility]    BUNIONECTOMY Left 08/02/2010    LEFT MEDIAL EXTRA-ARTICULAR GANGLION CYST REMOVAL AND FIRST MTP CHILECTOMY WITH SYNOVECTOMY, DR. BHAVANI BABCOCK AT [facility]    CHOLECYSTECTOMY N/A 1992    DR. QUINCY CEE    COLONOSCOPY N/A 09/18/2015    ENTIRE COLON WNL, RESCOPE IN 10 YRS, DR. QUINCY VILLARREAL AT [place]    COLONOSCOPY N/A 07/20/2005    WNL, DR. MAY VAZQUEZ AT [facility]    COLONOSCOPY N/A 1/22/2020    ENTIRE COLON WNL, RESCOPE IN 10 YRS, DR. CHINO MONROE AT [facility]    HERNIA REPAIR Bilateral 1972    DR. AIDEN ARZATE    HYSTERECTOMY N/A 1984    DR. ZEKE BLANDON    KNEE ARTHROSCOPY Right     KNEE CARTILAGE SURGERY Right 08/21/2012    RIGHT CHONDROPLASTY OF PATELLA AND TROCHLEA, CHONDROPLASTY MEDIAL FEMORAL CONDYLE, CHONDROPLASTY LATERAL TIBIAL PLATEAU AND LATERAL FEMORAL CONDYLE, SUBTOTAL LATERAL MENISCECTOMY, OSTEOPLASTY ANTERIOR TIBIAL INTERCONDYLAR NOTCH, DR. BHAVANI BABCOCK AT [facility]    NASAL ENDOSCOPY N/A 03/11/2015    ANTERIOR RHINOSCOPY, CONGESTION OF NASAL MUCOSA, DR. BRENDAN CHAMPION    SALPINGO OOPHORECTOMY Right 07/30/2008    FOR ADNEXAL MASS, DR. LISA PALM AT [facility]    TOTAL HIP ARTHROPLASTY Left 05/06/2013    DR.REID PALM AT [facility]    TOTAL HIP ARTHROPLASTY Right 10/13/2008    DR. KELVIN TARIQ AT [place]    TOTAL KNEE ARTHROPLASTY Left 01/20/2014    DR. GORDO PALM AT [facility]       Social History     Socioeconomic History    Marital status:    Tobacco Use    Smoking status: Never     Passive exposure: Never    Smokeless tobacco: Never   Vaping Use    Vaping status: Never Used   Substance and Sexual Activity    Alcohol use: No     Comment: daily caffiene    Drug use: No    Sexual activity: Defer      Birth control/protection: Post-menopausal, Surgical       Family History   Problem Relation Age of Onset    Hypertension Mother     Thyroid disease Mother     Stroke Mother     Asthma Mother     Asthma Father     Emphysema Father     Alcohol abuse Father     Hypertension Father     COPD Father     Thyroid disease Daughter     Diabetes type I Daughter     Diabetes Daughter     Stroke Maternal Grandfather         ischemic    Kidney disease Sister     Hypertension Sister     [place]'s disease Grandchild     Asthma Grandchild     Breast cancer Neg Hx        Review of Systems   Constitutional: Negative for decreased appetite, fever, malaise/fatigue and weight loss.   HENT:  Negative for nosebleeds.    Eyes:  Negative for double vision.   Cardiovascular:  Negative for chest pain, claudication, cyanosis, dyspnea on exertion, irregular heartbeat, leg swelling, near-syncope, orthopnea, palpitations, paroxysmal nocturnal dyspnea and syncope.   Respiratory:  Negative for cough, hemoptysis and shortness of breath.    Hematologic/Lymphatic: Negative for bleeding problem.   Skin:  Negative for rash.   Musculoskeletal:  Negative for falls and myalgias.   Gastrointestinal:  Negative for hematochezia, jaundice, melena, nausea and vomiting.   Genitourinary:  Negative for hematuria.   Neurological:  Negative for dizziness and seizures.   Psychiatric/Behavioral:  Negative for altered mental status and memory loss.        Allergies   Allergen Reactions    Statins Myalgia    Codeine Nausea And Vomiting    Penicillins Hives    Sulfa Antibiotics Nausea And Vomiting and Other (See Comments)     HEADACHES         Current Outpatient Medications:     aspirin 81 MG EC tablet, Take 1 tablet by mouth Daily., Disp: 100 tablet, Rfl: 3    carvedilol (COREG) 12.5 MG tablet, TAKE 1 TABLET BY MOUTH TWICE DAILY, Disp: 180 tablet, Rfl: 1    levothyroxine (SYNTHROID, LEVOTHROID) 88 MCG tablet, TAKE 1 TABLET BY MOUTH DAILY, Disp: 90 tablet, Rfl: 1     "Evolocumab (Repatha SureClick) solution auto-injector SureClick injection, Inject 1 mL under the skin into the appropriate area as directed Every 14 (Fourteen) Days. (Patient not taking: Reported on 12/20/2024), Disp: 6 mL, Rfl: 3    fluticasone (FLONASE) 50 MCG/ACT nasal spray, Administer 2 sprays into the nostril(s) as directed by provider Daily. (Patient not taking: Reported on 2/25/2025), Disp: 16 g, Rfl: 5    guaiFENesin-codeine (ROMILAR-AC) 100-10 MG/5ML solution/syrup, Take 5 mL by mouth 3 (Three) Times a Day As Needed for Cough or Congestion. (Patient not taking: Reported on 2/25/2025), Disp: 125 mL, Rfl: 0    ipratropium (ATROVENT) 0.06 % nasal spray, Administer 2 sprays into the nostril(s) as directed by provider 3 (Three) Times a Day. (Patient not taking: Reported on 2/25/2025), Disp: 15 mL, Rfl: 12    levalbuterol (Xopenex HFA) 45 MCG/ACT inhaler, Inhale 1-2 puffs Every 4 (Four) Hours As Needed for Wheezing. (Patient not taking: Reported on 12/20/2024), Disp: 1 each, Rfl: 5    meloxicam (Mobic) 7.5 MG tablet, Take 1 tablet by mouth Daily. (Patient not taking: Reported on 2/25/2025), Disp: 30 tablet, Rfl: 6    triamcinolone (KENALOG) 0.1 % ointment, Apply 1 Application topically to the appropriate area as directed 2 (Two) Times a Day. (Patient not taking: Reported on 2/25/2025), Disp: 30 g, Rfl: 0    Current Facility-Administered Medications:     cyanocobalamin injection 1,000 mcg, 1,000 mcg, Intramuscular, Q28 Days, Kacie Loving MD, 1,000 mcg at 01/20/25 1115      Objective:     Vitals:    02/25/25 1333   BP: 148/82   BP Location: Left arm   Patient Position: Sitting   Pulse: 94   Weight: 54.4 kg (120 lb)   Height: 157.5 cm (62\")     Body mass index is 21.95 kg/m².    Constitutional:       Appearance: Well-developed.   Eyes:      General: No scleral icterus.  HENT:      Head: Normocephalic.   Neck:      Thyroid: No thyromegaly.      Vascular: No JVD.      Lymphadenopathy: No cervical adenopathy. "   Pulmonary:      Effort: Pulmonary effort is normal.      Breath sounds: Normal breath sounds. No wheezing. No rales.   Cardiovascular:      Normal rate. Regular rhythm.      Murmurs: There is a harsh midsystolic murmur at the URSB, radiating to the neck.      No gallop.    Edema:     Peripheral edema absent.   Abdominal:      Palpations: Abdomen is soft.      Tenderness: There is no abdominal tenderness.   Musculoskeletal: Normal range of motion. Skin:     General: Skin is warm and dry.      Findings: No rash.   Neurological:      Mental Status: Alert and oriented to person, place, and time.           ECG 12 Lead    Date/Time: 2/25/2025 1:40 PM  Performed by: Andrew Peralta MD    Authorized by: Andrew Peralta MD  Comparison: compared with previous ECG   Similar to previous ECG  Rhythm: sinus rhythm  Other findings: left ventricular hypertrophy with strain    Clinical impression: abnormal EKG           Assessment:       Diagnosis Plan   1. Nonrheumatic aortic valve stenosis  Case Request Cath Lab: Left Heart Cath    Lipoprotein A (LPA)    Apolipoprotein B    CT Angio TAVR Chest Abdomen Pelvis      2. Atherosclerosis of native coronary artery of native heart without angina pectoris  Lipoprotein A (LPA)    Apolipoprotein B             Plan:       While she has severe aortic stenosis I think she is symptomatic with that and even if she was not I would recommend that we do something about it at this point.  She is got LVH with strain on her ECG also.  I spent a long time discussing with her and her 2 daughters the natural history of aortic stenosis and then options which I think at her age and her frailty her only real option would be a TAVR.  I talked with about the risk and benefits of a TAVR including the risk of death stroke pacemaker bleeding blood vessel damage.  They would like to move forward with that I am and to have him get set up to get a TAVR CT angiogram and I will do a heart cath on her on March 13  and then we should be set and ready to go to do her TAVR after that hopefully    No follow-ups on file.     As always, it has been a pleasure to participate in your patient's care.      Sincerely,       Andrew Peralta MD

## 2025-02-26 ENCOUNTER — TRANSCRIBE ORDERS (OUTPATIENT)
Dept: CARDIOLOGY | Facility: CLINIC | Age: 80
End: 2025-02-26
Payer: MEDICARE

## 2025-02-26 DIAGNOSIS — Z01.810 PRE-OPERATIVE CARDIOVASCULAR EXAMINATION: Primary | ICD-10-CM

## 2025-02-26 DIAGNOSIS — Z13.6 SCREENING FOR ISCHEMIC HEART DISEASE: ICD-10-CM

## 2025-02-28 ENCOUNTER — TELEPHONE (OUTPATIENT)
Dept: CARDIOLOGY | Facility: HOSPITAL | Age: 80
End: 2025-02-28
Payer: MEDICARE

## 2025-02-28 NOTE — TELEPHONE ENCOUNTER
I left a message for Ms Jorge asking her to call me to discuss the CTA It has been scheduled for 3/4/25 at 11am

## 2025-03-03 DIAGNOSIS — I35.0 NONRHEUMATIC AORTIC VALVE STENOSIS: Primary | ICD-10-CM

## 2025-03-05 ENCOUNTER — HOSPITAL ENCOUNTER (OUTPATIENT)
Dept: CT IMAGING | Facility: HOSPITAL | Age: 80
Discharge: HOME | End: 2025-03-05
Admitting: INTERNAL MEDICINE
Payer: MEDICARE

## 2025-03-05 DIAGNOSIS — I35.0 NONRHEUMATIC AORTIC VALVE STENOSIS: ICD-10-CM

## 2025-03-05 PROCEDURE — 71275 CT ANGIOGRAPHY CHEST: CPT

## 2025-03-05 PROCEDURE — 74174 CTA ABD&PLVS W/CONTRAST: CPT

## 2025-03-05 PROCEDURE — 25510000001 IOPAMIDOL PER 1 ML: Performed by: INTERNAL MEDICINE

## 2025-03-05 RX ORDER — IOPAMIDOL 755 MG/ML
100 INJECTION, SOLUTION INTRAVASCULAR
Status: COMPLETED | OUTPATIENT
Start: 2025-03-05 | End: 2025-03-05

## 2025-03-05 RX ADMIN — IOPAMIDOL 95 ML: 755 INJECTION, SOLUTION INTRAVENOUS at 14:27

## 2025-03-10 ENCOUNTER — OFFICE VISIT (OUTPATIENT)
Dept: CARDIAC SURGERY | Facility: CLINIC | Age: 80
End: 2025-03-10
Payer: MEDICARE

## 2025-03-10 VITALS
HEIGHT: 63 IN | WEIGHT: 120 LBS | BODY MASS INDEX: 21.26 KG/M2 | RESPIRATION RATE: 18 BRPM | HEART RATE: 85 BPM | DIASTOLIC BLOOD PRESSURE: 94 MMHG | SYSTOLIC BLOOD PRESSURE: 151 MMHG | OXYGEN SATURATION: 98 %

## 2025-03-10 DIAGNOSIS — I35.0 NONRHEUMATIC AORTIC VALVE STENOSIS: Primary | ICD-10-CM

## 2025-03-10 PROCEDURE — 99203 OFFICE O/P NEW LOW 30 MIN: CPT | Performed by: THORACIC SURGERY (CARDIOTHORACIC VASCULAR SURGERY)

## 2025-03-10 PROCEDURE — 3077F SYST BP >= 140 MM HG: CPT | Performed by: THORACIC SURGERY (CARDIOTHORACIC VASCULAR SURGERY)

## 2025-03-10 PROCEDURE — 1160F RVW MEDS BY RX/DR IN RCRD: CPT | Performed by: THORACIC SURGERY (CARDIOTHORACIC VASCULAR SURGERY)

## 2025-03-10 PROCEDURE — 1159F MED LIST DOCD IN RCRD: CPT | Performed by: THORACIC SURGERY (CARDIOTHORACIC VASCULAR SURGERY)

## 2025-03-10 PROCEDURE — 3080F DIAST BP >= 90 MM HG: CPT | Performed by: THORACIC SURGERY (CARDIOTHORACIC VASCULAR SURGERY)

## 2025-03-10 NOTE — LETTER
March 10, 2025     Kacie Loving MD  [address] [facility] [address]  [place] [address]    Patient: Dee Jorge   YOB: 1945   Date of Visit: 3/10/2025     Dear Kacie Loving MD:       Thank you for referring Dee Jorge to me for evaluation. Below are the relevant portions of my assessment and plan of care.    If you have questions, please do not hesitate to call me. I look forward to following Dee along with you.         Sincerely,        Mark Jaramillo MD        CC: MD John Vazquez, MD Clint Rivera,  Mark BOOKER MD  03/10/25 1425  Sign when Signing Visit  3/10/2025      Subjective:      Kacie Loving MD    Chief Complaint: Short of air and fatigue    History of Present Illness:       Dear Kacie Fall MD and Colleagues,  It was nice to see Dee Jorge in consultation at your request. She is a 80 y.o. female with a history of aortic stenosis who has developed class III symptoms of dyspnea and fatigue. She denies angina. The ECHO that I reviewed personally shows ejection fraction to 61%.  Her velocity across the aortic valve is 4 and 37 cm/s.  The valve area is 0.61 cm per squared.  The peak gradient is 76.4 mmHg and the mean gradient is 44.2 mmHg.  Her cardiac cath is scheduled for March 13.  She has no angina.  Her TAVR CTA shows unobstructed iliac arteries.  There is quite a bit of calcium in the abdominal aorta but appears to be nonobstructive.  The aortic valve is calcified and there is a bar of calcium extending down into the left ventricle from the aortic annulus.  She has multiple medical issues.    Patient Active Problem List   Diagnosis   • Anxiety   • Mixed hyperlipidemia   • Essential hypertension   • Hypothyroidism   • Pain in shoulder   • Leg edema, left   • Foot trauma   • Age-related osteoporosis without current pathological fracture   • Hearing loss   • High coronary artery calcium score   • Occult blood positive stool   •  CAD (coronary artery disease)   • Nonrheumatic aortic valve insufficiency   • Nonrheumatic aortic valve stenosis       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 CHARLES BARTLETT   • Seborrheic keratosis    • Trigger finger, left middle finger 02/2016       Past Surgical History:   Procedure Laterality Date   • ANTERIOR AND POSTERIOR VAGINAL REPAIR N/A 09/21/2011    RECTOCELE AND CYSTOCELE REPAIR, PLACEMENT OF PROLIFT GRAFT, DR. LISA PALM AT [facility]   • BUNIONECTOMY Left 08/02/2010    LEFT MEDIAL EXTRA-ARTICULAR GANGLION CYST REMOVAL AND FIRST MTP CHILECTOMY WITH SYNOVECTOMY, DR. BHAVANI BABCOCK AT [facility]   • CHOLECYSTECTOMY N/A 1992    DR. QUINCY CEE   • COLONOSCOPY N/A 09/18/2015    ENTIRE COLON WNL, RESCOPE IN 10 YRS, DR. QUINCY VILLARREAL AT [place]   • COLONOSCOPY N/A 07/20/2005    WNL, DR. MAY VAZQUEZ AT [facility]   • COLONOSCOPY N/A 1/22/2020    ENTIRE COLON WNL, RESCOPE IN 10 YRS, DR. CHINO MONROE AT [facility]   • HERNIA REPAIR Bilateral 1972    DR. AIDEN ARZATE   • HYSTERECTOMY N/A 1984    DR. ZEKE BLANDON   • KNEE ARTHROSCOPY Right    • KNEE CARTILAGE SURGERY Right 08/21/2012    RIGHT CHONDROPLASTY OF PATELLA AND TROCHLEA,  CHONDROPLASTY MEDIAL FEMORAL CONDYLE, CHONDROPLASTY LATERAL TIBIAL PLATEAU AND LATERAL FEMORAL CONDYLE, SUBTOTAL LATERAL MENISCECTOMY, OSTEOPLASTY ANTERIOR TIBIAL INTERCONDYLAR NOTCH, DR. BHAVANI BABCOCK AT [facility]   • NASAL ENDOSCOPY N/A 03/11/2015    ANTERIOR RHINOSCOPY, CONGESTION OF NASAL MUCOSA, DR. BRENDAN CHAMPION   • SALPINGO OOPHORECTOMY Right 07/30/2008    FOR ADNEXAL MASS, DR. LISA PALM AT [facility]   • TOTAL HIP ARTHROPLASTY Left 05/06/2013    DR.REID PALM AT [facility]   • TOTAL HIP ARTHROPLASTY Right 10/13/2008    DR. KELVIN TARIQ AT [place]   • TOTAL KNEE ARTHROPLASTY Left 01/20/2014    DR. GORDO PALM AT [facility]       Allergies   Allergen Reactions   • Statins Myalgia   • Codeine Nausea And Vomiting   • Penicillins Hives   • Sulfa Antibiotics Nausea And Vomiting and Other (See Comments)     HEADACHES         Current Outpatient Medications:   •  aspirin 81 MG EC tablet, Take 1 tablet by mouth Daily., Disp: 100 tablet, Rfl: 3  •  carvedilol (COREG) 12.5 MG tablet, TAKE 1 TABLET BY MOUTH TWICE DAILY, Disp: 180 tablet, Rfl: 1  •  fluticasone (FLONASE) 50 MCG/ACT nasal spray, Administer 2 sprays into the nostril(s) as directed by provider Daily., Disp: 16 g, Rfl: 5  •  levalbuterol (Xopenex HFA) 45 MCG/ACT inhaler, Inhale 1-2 puffs Every 4 (Four) Hours As Needed for Wheezing., Disp: 1 each, Rfl: 5  •  levothyroxine (SYNTHROID, LEVOTHROID) 88 MCG tablet, TAKE 1 TABLET BY MOUTH DAILY, Disp: 90 tablet, Rfl: 1  •  Evolocumab (Repatha SureClick) solution auto-injector SureClick injection, Inject 1 mL under the skin into the appropriate area as directed Every 14 (Fourteen) Days. (Patient not taking: Reported on 3/10/2025), Disp: 6 mL, Rfl: 3  •  guaiFENesin-codeine (ROMILAR-AC) 100-10 MG/5ML solution/syrup, Take 5 mL by mouth 3 (Three) Times a Day As Needed for Cough or Congestion. (Patient not taking: Reported on 3/10/2025), Disp: 125 mL, Rfl: 0  •  ipratropium (ATROVENT) 0.06 % nasal spray, Administer 2 sprays into the  nostril(s) as directed by provider 3 (Three) Times a Day. (Patient not taking: Reported on 3/10/2025), Disp: 15 mL, Rfl: 12  •  meloxicam (Mobic) 7.5 MG tablet, Take 1 tablet by mouth Daily. (Patient not taking: Reported on 3/10/2025), Disp: 30 tablet, Rfl: 6  •  triamcinolone (KENALOG) 0.1 % ointment, Apply 1 Application topically to the appropriate area as directed 2 (Two) Times a Day. (Patient not taking: Reported on 3/10/2025), Disp: 30 g, Rfl: 0    Current Facility-Administered Medications:   •  cyanocobalamin injection 1,000 mcg, 1,000 mcg, Intramuscular, Q28 Days, Kacie Loving MD, 1,000 mcg at 02/25/25 1447    Social History     Socioeconomic History   • Marital status:    Tobacco Use   • Smoking status: Never     Passive exposure: Never   • Smokeless tobacco: Never   Vaping Use   • Vaping status: Never Used   Substance and Sexual Activity   • Alcohol use: No     Comment: daily caffiene   • Drug use: No   • Sexual activity: Defer     Birth control/protection: Post-menopausal, Surgical       Family History   Problem Relation Age of Onset   • Hypertension Mother    • Thyroid disease Mother    • Stroke Mother    • Asthma Mother    • Asthma Father    • Emphysema Father    • Alcohol abuse Father    • Hypertension Father    • COPD Father    • Thyroid disease Daughter    • Diabetes type I Daughter    • Diabetes Daughter    • Stroke Maternal Grandfather         ischemic   • Kidney disease Sister    • Hypertension Sister    • Twan's disease Grandchild    • Asthma Grandchild    • Breast cancer Neg Hx            Review of Systems:  Review of Systems   Constitutional:  Positive for activity change and fatigue.   HENT: Negative.     Eyes: Negative.    Respiratory:  Positive for shortness of breath.    Cardiovascular: Negative.    Gastrointestinal: Negative.    Endocrine: Negative.    Genitourinary: Negative.    Musculoskeletal: Negative.    Allergic/Immunologic: Negative.    Neurological: Negative.     Hematological: Negative.    Psychiatric/Behavioral: Negative.       Cardiovascular ROS: positive for - dyspnea on exertion and fatigue  Physical Exam:    Vital Signs:  Weight: 54.4 kg (120 lb)   Body mass index is 21.6 kg/m².  Temp: (not recorded)   Heart Rate: 85   BP: 151/94     Constitutional:       Appearance: Healthy appearance.   Eyes:      Conjunctiva/sclera: Conjunctivae normal.      Pupils: Pupils are equal, round, and reactive to light.   HENT:      Nose: Nose normal.    Mouth/Throat:      Dentition: Normal.      Pharynx: Oropharynx is clear.   Neck:      Vascular: No JVR. JVD normal.   Pulmonary:      Effort: Pulmonary effort is normal.      Breath sounds: Normal breath sounds.   Chest:      Chest wall: Not tender to palpatation.   Cardiovascular:      PMI at left midclavicular line. Normal rate. Regular rhythm. Normal S1. Normal S2.       Murmurs: There is a grade 3/6 harsh midsystolic murmur at the URSB, radiating to the neck.      No gallop.  No click. No rub.   Pulses:     Carotid: 4+ with bruit bilaterally.     Radial: 4+ bilaterally.     Femoral: 4+ bilaterally.     Dorsalis pedis: 4+ bilaterally.     Posterior tibial: 4+ bilaterally.  Edema:     Peripheral edema absent.   Abdominal:      General: Bowel sounds are normal.      Palpations: Abdomen is soft.   Musculoskeletal: Normal range of motion.      Cervical back: Normal range of motion and neck supple. Skin:     General: Skin is warm and dry.   Neurological:      General: No focal deficit present.      Mental Status: Alert and oriented to person, place and time.          Assessment:       She has class III symptoms from aortic stenosis.  She has multiple medical issues.  She has not yet had a cardiac catheterization.          Recommendation/Plan:     I have seen and examined the patient and evaluated the patient's suitability for open aortic valve replacement surgery.  Based on my assessment, as documented above, I agree with moving forward  with this patient as a TAVR candidate.             Thank you for allowing me to participate in her care.    Regards,    Mark Jaramillo MD

## 2025-03-10 NOTE — H&P (VIEW-ONLY)
3/10/2025      Subjective:      Kacie Loving MD    Chief Complaint: Short of air and fatigue    History of Present Illness:       Dear Kacie Fall MD and Colleagues,  It was nice to see Dee Jorge in consultation at your request. She is a 80 y.o. female with a history of aortic stenosis who has developed class III symptoms of dyspnea and fatigue. She denies angina. The ECHO that I reviewed personally shows ejection fraction to 61%.  Her velocity across the aortic valve is 4 and 37 cm/s.  The valve area is 0.61 cm per squared.  The peak gradient is 76.4 mmHg and the mean gradient is 44.2 mmHg.  Her cardiac cath is scheduled for March 13.  She has no angina.  Her TAVR CTA shows unobstructed iliac arteries.  There is quite a bit of calcium in the abdominal aorta but appears to be nonobstructive.  The aortic valve is calcified and there is a bar of calcium extending down into the left ventricle from the aortic annulus.  She has multiple medical issues.    Patient Active Problem List   Diagnosis    Anxiety    Mixed hyperlipidemia    Essential hypertension    Hypothyroidism    Pain in shoulder    Leg edema, left    Foot trauma    Age-related osteoporosis without current pathological fracture    Hearing loss    High coronary artery calcium score    Occult blood positive stool    CAD (coronary artery disease)    Nonrheumatic aortic valve insufficiency    Nonrheumatic aortic valve stenosis       Past Medical History:   Diagnosis Date    Abnormal electrocardiogram (ECG) (EKG) 08/28/2018    Actinic keratosis     Adnexal mass 07/2008    RIGHT    Anemia     Anxiety     Aortic regurgitation     Arthritis     Asthma     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 CHARLES BARTLETT    Seborrheic keratosis     Trigger finger, left middle finger 02/2016       Past Surgical History:   Procedure Laterality Date    ANTERIOR AND POSTERIOR VAGINAL REPAIR N/A 09/21/2011    RECTOCELE AND CYSTOCELE REPAIR, PLACEMENT OF PROLIFT GRAFT, DR. LISA PALM AT [facility]    BUNIONECTOMY Left 08/02/2010    LEFT MEDIAL EXTRA-ARTICULAR GANGLION CYST REMOVAL AND FIRST MTP CHILECTOMY WITH SYNOVECTOMY, DR. BHAVANI BABCOCK AT [facility]    CHOLECYSTECTOMY N/A 1992    DR. QUINCY CEE    COLONOSCOPY N/A 09/18/2015    ENTIRE COLON WNL, RESCOPE IN 10 YRS, DR. QUINCY VILLARREAL AT [place]    COLONOSCOPY N/A 07/20/2005    WNL, DR. MAY VAZQUEZ AT [facility]    COLONOSCOPY N/A 1/22/2020    ENTIRE COLON WNL, RESCOPE IN 10 YRS, DR. CHINO MONROE AT [facility]    HERNIA REPAIR Bilateral 1972    DR. AIDEN ARZATE    HYSTERECTOMY N/A 1984    DR. ZEKE BLANDON    KNEE ARTHROSCOPY Right     KNEE CARTILAGE SURGERY Right 08/21/2012    RIGHT CHONDROPLASTY OF PATELLA AND TROCHLEA, CHONDROPLASTY MEDIAL FEMORAL CONDYLE, CHONDROPLASTY LATERAL TIBIAL PLATEAU AND LATERAL FEMORAL CONDYLE, SUBTOTAL LATERAL MENISCECTOMY, OSTEOPLASTY ANTERIOR TIBIAL INTERCONDYLAR NOTCH, DR. BHAVANI BABCOCK AT [facility]    NASAL ENDOSCOPY N/A 03/11/2015    ANTERIOR RHINOSCOPY, CONGESTION OF NASAL MUCOSA, DR. BRENDAN CHAMPION    SALPINGO OOPHORECTOMY Right 07/30/2008    FOR ADNEXAL MASS, DR. LISA PALM AT [facility]    TOTAL HIP ARTHROPLASTY Left 05/06/2013    DR.REID PALM AT [facility]    TOTAL HIP ARTHROPLASTY Right 10/13/2008    DR. KELVIN TARIQ AT [place]    TOTAL KNEE ARTHROPLASTY Left 01/20/2014    DR. GORDO PALM AT [facility]       Allergies   Allergen Reactions    Statins Myalgia    Codeine Nausea And  Vomiting    Penicillins Hives    Sulfa Antibiotics Nausea And Vomiting and Other (See Comments)     HEADACHES         Current Outpatient Medications:     aspirin 81 MG EC tablet, Take 1 tablet by mouth Daily., Disp: 100 tablet, Rfl: 3    carvedilol (COREG) 12.5 MG tablet, TAKE 1 TABLET BY MOUTH TWICE DAILY, Disp: 180 tablet, Rfl: 1    fluticasone (FLONASE) 50 MCG/ACT nasal spray, Administer 2 sprays into the nostril(s) as directed by provider Daily., Disp: 16 g, Rfl: 5    levalbuterol (Xopenex HFA) 45 MCG/ACT inhaler, Inhale 1-2 puffs Every 4 (Four) Hours As Needed for Wheezing., Disp: 1 each, Rfl: 5    levothyroxine (SYNTHROID, LEVOTHROID) 88 MCG tablet, TAKE 1 TABLET BY MOUTH DAILY, Disp: 90 tablet, Rfl: 1    Evolocumab (Repatha SureClick) solution auto-injector SureClick injection, Inject 1 mL under the skin into the appropriate area as directed Every 14 (Fourteen) Days. (Patient not taking: Reported on 3/10/2025), Disp: 6 mL, Rfl: 3    guaiFENesin-codeine (ROMILAR-AC) 100-10 MG/5ML solution/syrup, Take 5 mL by mouth 3 (Three) Times a Day As Needed for Cough or Congestion. (Patient not taking: Reported on 3/10/2025), Disp: 125 mL, Rfl: 0    ipratropium (ATROVENT) 0.06 % nasal spray, Administer 2 sprays into the nostril(s) as directed by provider 3 (Three) Times a Day. (Patient not taking: Reported on 3/10/2025), Disp: 15 mL, Rfl: 12    meloxicam (Mobic) 7.5 MG tablet, Take 1 tablet by mouth Daily. (Patient not taking: Reported on 3/10/2025), Disp: 30 tablet, Rfl: 6    triamcinolone (KENALOG) 0.1 % ointment, Apply 1 Application topically to the appropriate area as directed 2 (Two) Times a Day. (Patient not taking: Reported on 3/10/2025), Disp: 30 g, Rfl: 0    Current Facility-Administered Medications:     cyanocobalamin injection 1,000 mcg, 1,000 mcg, Intramuscular, Q28 Days, Kacie Loving MD, 1,000 mcg at 02/25/25 6847    Social History     Socioeconomic History    Marital status:    Tobacco Use     Smoking status: Never     Passive exposure: Never    Smokeless tobacco: Never   Vaping Use    Vaping status: Never Used   Substance and Sexual Activity    Alcohol use: No     Comment: daily caffiene    Drug use: No    Sexual activity: Defer     Birth control/protection: Post-menopausal, Surgical       Family History   Problem Relation Age of Onset    Hypertension Mother     Thyroid disease Mother     Stroke Mother     Asthma Mother     Asthma Father     Emphysema Father     Alcohol abuse Father     Hypertension Father     COPD Father     Thyroid disease Daughter     Diabetes type I Daughter     Diabetes Daughter     Stroke Maternal Grandfather         ischemic    Kidney disease Sister     Hypertension Sister     Twna's disease Grandchild     Asthma Grandchild     Breast cancer Neg Hx            Review of Systems:  Review of Systems   Constitutional:  Positive for activity change and fatigue.   HENT: Negative.     Eyes: Negative.    Respiratory:  Positive for shortness of breath.    Cardiovascular: Negative.    Gastrointestinal: Negative.    Endocrine: Negative.    Genitourinary: Negative.    Musculoskeletal: Negative.    Allergic/Immunologic: Negative.    Neurological: Negative.    Hematological: Negative.    Psychiatric/Behavioral: Negative.       Cardiovascular ROS: positive for - dyspnea on exertion and fatigue  Physical Exam:    Vital Signs:  Weight: 54.4 kg (120 lb)   Body mass index is 21.6 kg/m².  Temp: (not recorded)   Heart Rate: 85   BP: 151/94     Constitutional:       Appearance: Healthy appearance.   Eyes:      Conjunctiva/sclera: Conjunctivae normal.      Pupils: Pupils are equal, round, and reactive to light.   HENT:      Nose: Nose normal.    Mouth/Throat:      Dentition: Normal.      Pharynx: Oropharynx is clear.   Neck:      Vascular: No JVR. JVD normal.   Pulmonary:      Effort: Pulmonary effort is normal.      Breath sounds: Normal breath sounds.   Chest:      Chest wall: Not tender to  palpatation.   Cardiovascular:      PMI at left midclavicular line. Normal rate. Regular rhythm. Normal S1. Normal S2.       Murmurs: There is a grade 3/6 harsh midsystolic murmur at the URSB, radiating to the neck.      No gallop.  No click. No rub.   Pulses:     Carotid: 4+ with bruit bilaterally.     Radial: 4+ bilaterally.     Femoral: 4+ bilaterally.     Dorsalis pedis: 4+ bilaterally.     Posterior tibial: 4+ bilaterally.  Edema:     Peripheral edema absent.   Abdominal:      General: Bowel sounds are normal.      Palpations: Abdomen is soft.   Musculoskeletal: Normal range of motion.      Cervical back: Normal range of motion and neck supple. Skin:     General: Skin is warm and dry.   Neurological:      General: No focal deficit present.      Mental Status: Alert and oriented to person, place and time.          Assessment:       She has class III symptoms from aortic stenosis.  She has multiple medical issues.  She has not yet had a cardiac catheterization.          Recommendation/Plan:     I have seen and examined the patient and evaluated the patient's suitability for open aortic valve replacement surgery.  Based on my assessment, as documented above, I agree with moving forward with this patient as a TAVR candidate.        Thank you for allowing me to participate in her care.    Regards,    Mark Jaramillo MD

## 2025-03-10 NOTE — PROGRESS NOTES
3/10/2025      Subjective:      Kacie Loving MD    Chief Complaint: Short of air and fatigue    History of Present Illness:       Dear Kacie Fall MD and Colleagues,  It was nice to see Dee Jorge in consultation at your request. She is a 80 y.o. female with a history of aortic stenosis who has developed class III symptoms of dyspnea and fatigue. She denies angina. The ECHO that I reviewed personally shows ejection fraction to 61%.  Her velocity across the aortic valve is 4 and 37 cm/s.  The valve area is 0.61 cm per squared.  The peak gradient is 76.4 mmHg and the mean gradient is 44.2 mmHg.  Her cardiac cath is scheduled for March 13.  She has no angina.  Her TAVR CTA shows unobstructed iliac arteries.  There is quite a bit of calcium in the abdominal aorta but appears to be nonobstructive.  The aortic valve is calcified and there is a bar of calcium extending down into the left ventricle from the aortic annulus.  She has multiple medical issues.    Patient Active Problem List   Diagnosis    Anxiety    Mixed hyperlipidemia    Essential hypertension    Hypothyroidism    Pain in shoulder    Leg edema, left    Foot trauma    Age-related osteoporosis without current pathological fracture    Hearing loss    High coronary artery calcium score    Occult blood positive stool    CAD (coronary artery disease)    Nonrheumatic aortic valve insufficiency    Nonrheumatic aortic valve stenosis       Past Medical History:   Diagnosis Date    Abnormal electrocardiogram (ECG) (EKG) 08/28/2018    Actinic keratosis     Adnexal mass 07/2008    RIGHT    Anemia     Anxiety     Aortic regurgitation     Arthritis     Asthma     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 CHARLES BARTLETT    Seborrheic keratosis     Trigger finger, left middle finger 02/2016       Past Surgical History:   Procedure Laterality Date    ANTERIOR AND POSTERIOR VAGINAL REPAIR N/A 09/21/2011    RECTOCELE AND CYSTOCELE REPAIR, PLACEMENT OF PROLIFT GRAFT, DR. LISA PALM AT [facility]    BUNIONECTOMY Left 08/02/2010    LEFT MEDIAL EXTRA-ARTICULAR GANGLION CYST REMOVAL AND FIRST MTP CHILECTOMY WITH SYNOVECTOMY, DR. BHAVANI BABCOCK AT [facility]    CHOLECYSTECTOMY N/A 1992    DR. QUINCY CEE    COLONOSCOPY N/A 09/18/2015    ENTIRE COLON WNL, RESCOPE IN 10 YRS, DR. QUINCY VILLARREAL AT [place]    COLONOSCOPY N/A 07/20/2005    WNL, DR. MAY VAZQUEZ AT [facility]    COLONOSCOPY N/A 1/22/2020    ENTIRE COLON WNL, RESCOPE IN 10 YRS, DR. CHINO MONROE AT [facility]    HERNIA REPAIR Bilateral 1972    DR. AIDEN ARZATE    HYSTERECTOMY N/A 1984    DR. ZEKE BLANDON    KNEE ARTHROSCOPY Right     KNEE CARTILAGE SURGERY Right 08/21/2012    RIGHT CHONDROPLASTY OF PATELLA AND TROCHLEA, CHONDROPLASTY MEDIAL FEMORAL CONDYLE, CHONDROPLASTY LATERAL TIBIAL PLATEAU AND LATERAL FEMORAL CONDYLE, SUBTOTAL LATERAL MENISCECTOMY, OSTEOPLASTY ANTERIOR TIBIAL INTERCONDYLAR NOTCH, DR. BHAVANI BABCOCK AT [facility]    NASAL ENDOSCOPY N/A 03/11/2015    ANTERIOR RHINOSCOPY, CONGESTION OF NASAL MUCOSA, DR. BRENDAN CHAMPION    SALPINGO OOPHORECTOMY Right 07/30/2008    FOR ADNEXAL MASS, DR. LISA PALM AT [facility]    TOTAL HIP ARTHROPLASTY Left 05/06/2013    DR.REID PALM AT [facility]    TOTAL HIP ARTHROPLASTY Right 10/13/2008    DR. KELVIN TARIQ AT [place]    TOTAL KNEE ARTHROPLASTY Left 01/20/2014    DR. GORDO PALM AT [facility]       Allergies   Allergen Reactions    Statins Myalgia    Codeine Nausea And  Vomiting    Penicillins Hives    Sulfa Antibiotics Nausea And Vomiting and Other (See Comments)     HEADACHES         Current Outpatient Medications:     aspirin 81 MG EC tablet, Take 1 tablet by mouth Daily., Disp: 100 tablet, Rfl: 3    carvedilol (COREG) 12.5 MG tablet, TAKE 1 TABLET BY MOUTH TWICE DAILY, Disp: 180 tablet, Rfl: 1    fluticasone (FLONASE) 50 MCG/ACT nasal spray, Administer 2 sprays into the nostril(s) as directed by provider Daily., Disp: 16 g, Rfl: 5    levalbuterol (Xopenex HFA) 45 MCG/ACT inhaler, Inhale 1-2 puffs Every 4 (Four) Hours As Needed for Wheezing., Disp: 1 each, Rfl: 5    levothyroxine (SYNTHROID, LEVOTHROID) 88 MCG tablet, TAKE 1 TABLET BY MOUTH DAILY, Disp: 90 tablet, Rfl: 1    Evolocumab (Repatha SureClick) solution auto-injector SureClick injection, Inject 1 mL under the skin into the appropriate area as directed Every 14 (Fourteen) Days. (Patient not taking: Reported on 3/10/2025), Disp: 6 mL, Rfl: 3    guaiFENesin-codeine (ROMILAR-AC) 100-10 MG/5ML solution/syrup, Take 5 mL by mouth 3 (Three) Times a Day As Needed for Cough or Congestion. (Patient not taking: Reported on 3/10/2025), Disp: 125 mL, Rfl: 0    ipratropium (ATROVENT) 0.06 % nasal spray, Administer 2 sprays into the nostril(s) as directed by provider 3 (Three) Times a Day. (Patient not taking: Reported on 3/10/2025), Disp: 15 mL, Rfl: 12    meloxicam (Mobic) 7.5 MG tablet, Take 1 tablet by mouth Daily. (Patient not taking: Reported on 3/10/2025), Disp: 30 tablet, Rfl: 6    triamcinolone (KENALOG) 0.1 % ointment, Apply 1 Application topically to the appropriate area as directed 2 (Two) Times a Day. (Patient not taking: Reported on 3/10/2025), Disp: 30 g, Rfl: 0    Current Facility-Administered Medications:     cyanocobalamin injection 1,000 mcg, 1,000 mcg, Intramuscular, Q28 Days, Kacie Loving MD, 1,000 mcg at 02/25/25 9187    Social History     Socioeconomic History    Marital status:    Tobacco Use     Smoking status: Never     Passive exposure: Never    Smokeless tobacco: Never   Vaping Use    Vaping status: Never Used   Substance and Sexual Activity    Alcohol use: No     Comment: daily caffiene    Drug use: No    Sexual activity: Defer     Birth control/protection: Post-menopausal, Surgical       Family History   Problem Relation Age of Onset    Hypertension Mother     Thyroid disease Mother     Stroke Mother     Asthma Mother     Asthma Father     Emphysema Father     Alcohol abuse Father     Hypertension Father     COPD Father     Thyroid disease Daughter     Diabetes type I Daughter     Diabetes Daughter     Stroke Maternal Grandfather         ischemic    Kidney disease Sister     Hypertension Sister     Twan's disease Grandchild     Asthma Grandchild     Breast cancer Neg Hx            Review of Systems:  Review of Systems   Constitutional:  Positive for activity change and fatigue.   HENT: Negative.     Eyes: Negative.    Respiratory:  Positive for shortness of breath.    Cardiovascular: Negative.    Gastrointestinal: Negative.    Endocrine: Negative.    Genitourinary: Negative.    Musculoskeletal: Negative.    Allergic/Immunologic: Negative.    Neurological: Negative.    Hematological: Negative.    Psychiatric/Behavioral: Negative.       Cardiovascular ROS: positive for - dyspnea on exertion and fatigue  Physical Exam:    Vital Signs:  Weight: 54.4 kg (120 lb)   Body mass index is 21.6 kg/m².  Temp: (not recorded)   Heart Rate: 85   BP: 151/94     Constitutional:       Appearance: Healthy appearance.   Eyes:      Conjunctiva/sclera: Conjunctivae normal.      Pupils: Pupils are equal, round, and reactive to light.   HENT:      Nose: Nose normal.    Mouth/Throat:      Dentition: Normal.      Pharynx: Oropharynx is clear.   Neck:      Vascular: No JVR. JVD normal.   Pulmonary:      Effort: Pulmonary effort is normal.      Breath sounds: Normal breath sounds.   Chest:      Chest wall: Not tender to  palpatation.   Cardiovascular:      PMI at left midclavicular line. Normal rate. Regular rhythm. Normal S1. Normal S2.       Murmurs: There is a grade 3/6 harsh midsystolic murmur at the URSB, radiating to the neck.      No gallop.  No click. No rub.   Pulses:     Carotid: 4+ with bruit bilaterally.     Radial: 4+ bilaterally.     Femoral: 4+ bilaterally.     Dorsalis pedis: 4+ bilaterally.     Posterior tibial: 4+ bilaterally.  Edema:     Peripheral edema absent.   Abdominal:      General: Bowel sounds are normal.      Palpations: Abdomen is soft.   Musculoskeletal: Normal range of motion.      Cervical back: Normal range of motion and neck supple. Skin:     General: Skin is warm and dry.   Neurological:      General: No focal deficit present.      Mental Status: Alert and oriented to person, place and time.          Assessment:       She has class III symptoms from aortic stenosis.  She has multiple medical issues.  She has not yet had a cardiac catheterization.          Recommendation/Plan:     I have seen and examined the patient and evaluated the patient's suitability for open aortic valve replacement surgery.  Based on my assessment, as documented above, I agree with moving forward with this patient as a TAVR candidate.        Thank you for allowing me to participate in her care.    Regards,    Mark Jaramillo MD

## 2025-03-12 ENCOUNTER — LAB (OUTPATIENT)
Dept: LAB | Facility: HOSPITAL | Age: 80
End: 2025-03-12
Payer: MEDICARE

## 2025-03-12 ENCOUNTER — TELEPHONE (OUTPATIENT)
Dept: CARDIOLOGY | Facility: HOSPITAL | Age: 80
End: 2025-03-12
Payer: MEDICARE

## 2025-03-12 DIAGNOSIS — Z01.810 PRE-OPERATIVE CARDIOVASCULAR EXAMINATION: ICD-10-CM

## 2025-03-12 DIAGNOSIS — Z13.6 SCREENING FOR ISCHEMIC HEART DISEASE: ICD-10-CM

## 2025-03-12 LAB
ANION GAP SERPL CALCULATED.3IONS-SCNC: 9.2 MMOL/L (ref 5–15)
BUN SERPL-MCNC: 16 MG/DL (ref 8–23)
BUN/CREAT SERPL: 22.5 (ref 7–25)
CALCIUM SPEC-SCNC: 9.5 MG/DL (ref 8.6–10.5)
CHLORIDE SERPL-SCNC: 106 MMOL/L (ref 98–107)
CO2 SERPL-SCNC: 23.8 MMOL/L (ref 22–29)
CREAT SERPL-MCNC: 0.71 MG/DL (ref 0.57–1)
EGFRCR SERPLBLD CKD-EPI 2021: 85.5 ML/MIN/1.73
GLUCOSE SERPL-MCNC: 102 MG/DL (ref 65–99)
POTASSIUM SERPL-SCNC: 4 MMOL/L (ref 3.5–5.2)
SODIUM SERPL-SCNC: 139 MMOL/L (ref 136–145)

## 2025-03-12 PROCEDURE — 80048 BASIC METABOLIC PNL TOTAL CA: CPT

## 2025-03-12 PROCEDURE — 36415 COLL VENOUS BLD VENIPUNCTURE: CPT

## 2025-03-12 NOTE — TELEPHONE ENCOUNTER
I spoke with Mrs Jorge this morning She had questions concerning her cath scheduled for tomorrow 3/13. I clarified instructions with Casie KIRK from the cath lab. Mrs Jorge is to be NPO after midnight tonight She can take her medications as usual She does need to have labs drawn and will do this in the morning at 6:30 at our outpatient lab She is aware that she needs to register at the desk on the second floor for her cath after having the labs drawn. She has my contact information and will call with any further questions  
Yes

## 2025-03-13 ENCOUNTER — HOSPITAL ENCOUNTER (OUTPATIENT)
Facility: HOSPITAL | Age: 80
Setting detail: HOSPITAL OUTPATIENT SURGERY
Discharge: HOME | End: 2025-03-13
Attending: INTERNAL MEDICINE | Admitting: INTERNAL MEDICINE
Payer: MEDICARE

## 2025-03-13 ENCOUNTER — TELEPHONE (OUTPATIENT)
Dept: CARDIOLOGY | Facility: CLINIC | Age: 80
End: 2025-03-13

## 2025-03-13 VITALS
HEART RATE: 72 BPM | RESPIRATION RATE: 16 BRPM | BODY MASS INDEX: 22.08 KG/M2 | TEMPERATURE: 98.2 F | HEIGHT: 62 IN | OXYGEN SATURATION: 96 % | SYSTOLIC BLOOD PRESSURE: 144 MMHG | DIASTOLIC BLOOD PRESSURE: 69 MMHG | WEIGHT: 120 LBS

## 2025-03-13 DIAGNOSIS — Z01.810 PRE-OPERATIVE CARDIOVASCULAR EXAMINATION: ICD-10-CM

## 2025-03-13 DIAGNOSIS — I35.0 NONRHEUMATIC AORTIC VALVE STENOSIS: ICD-10-CM

## 2025-03-13 DIAGNOSIS — I25.10 ATHEROSCLEROSIS OF NATIVE CORONARY ARTERY OF NATIVE HEART WITHOUT ANGINA PECTORIS: ICD-10-CM

## 2025-03-13 DIAGNOSIS — Z13.6 SCREENING FOR ISCHEMIC HEART DISEASE: ICD-10-CM

## 2025-03-13 LAB
DEPRECATED RDW RBC AUTO: 41.6 FL (ref 37–54)
ERYTHROCYTE [DISTWIDTH] IN BLOOD BY AUTOMATED COUNT: 13.3 % (ref 12.3–15.4)
HCT VFR BLD AUTO: 34.2 % (ref 34–46.6)
HGB BLD-MCNC: 11.2 G/DL (ref 12–15.9)
MCH RBC QN AUTO: 28.4 PG (ref 26.6–33)
MCHC RBC AUTO-ENTMCNC: 32.7 G/DL (ref 31.5–35.7)
MCV RBC AUTO: 86.6 FL (ref 79–97)
PLATELET # BLD AUTO: 458 10*3/MM3 (ref 140–450)
PMV BLD AUTO: 8.6 FL (ref 6–12)
RBC # BLD AUTO: 3.95 10*6/MM3 (ref 3.77–5.28)
WBC NRBC COR # BLD AUTO: 7.99 10*3/MM3 (ref 3.4–10.8)

## 2025-03-13 PROCEDURE — 25010000002 HEPARIN (PORCINE) PER 1000 UNITS: Performed by: INTERNAL MEDICINE

## 2025-03-13 PROCEDURE — 25010000002 LIDOCAINE 2% SOLUTION: Performed by: INTERNAL MEDICINE

## 2025-03-13 PROCEDURE — 93454 CORONARY ARTERY ANGIO S&I: CPT | Performed by: INTERNAL MEDICINE

## 2025-03-13 PROCEDURE — C1769 GUIDE WIRE: HCPCS | Performed by: INTERNAL MEDICINE

## 2025-03-13 PROCEDURE — C1894 INTRO/SHEATH, NON-LASER: HCPCS | Performed by: INTERNAL MEDICINE

## 2025-03-13 PROCEDURE — 25010000002 FENTANYL CITRATE (PF) 50 MCG/ML SOLUTION: Performed by: INTERNAL MEDICINE

## 2025-03-13 PROCEDURE — 85027 COMPLETE CBC AUTOMATED: CPT | Performed by: INTERNAL MEDICINE

## 2025-03-13 PROCEDURE — 83695 ASSAY OF LIPOPROTEIN(A): CPT | Performed by: INTERNAL MEDICINE

## 2025-03-13 PROCEDURE — 25510000001 IOPAMIDOL PER 1 ML: Performed by: INTERNAL MEDICINE

## 2025-03-13 PROCEDURE — 25010000002 MIDAZOLAM PER 1 MG: Performed by: INTERNAL MEDICINE

## 2025-03-13 PROCEDURE — 82172 ASSAY OF APOLIPOPROTEIN: CPT | Performed by: INTERNAL MEDICINE

## 2025-03-13 RX ORDER — HEPARIN SODIUM 1000 [USP'U]/ML
INJECTION, SOLUTION INTRAVENOUS; SUBCUTANEOUS
Status: DISCONTINUED | OUTPATIENT
Start: 2025-03-13 | End: 2025-03-13 | Stop reason: HOSPADM

## 2025-03-13 RX ORDER — SODIUM CHLORIDE 9 MG/ML
75 INJECTION, SOLUTION INTRAVENOUS CONTINUOUS
Status: ACTIVE | OUTPATIENT
Start: 2025-03-13 | End: 2025-03-13

## 2025-03-13 RX ORDER — SODIUM CHLORIDE 0.9 % (FLUSH) 0.9 %
10 SYRINGE (ML) INJECTION EVERY 12 HOURS SCHEDULED
Status: DISCONTINUED | OUTPATIENT
Start: 2025-03-13 | End: 2025-03-13 | Stop reason: HOSPADM

## 2025-03-13 RX ORDER — VERAPAMIL HYDROCHLORIDE 2.5 MG/ML
INJECTION, SOLUTION INTRAVENOUS
Status: DISCONTINUED | OUTPATIENT
Start: 2025-03-13 | End: 2025-03-13 | Stop reason: HOSPADM

## 2025-03-13 RX ORDER — SODIUM CHLORIDE 0.9 % (FLUSH) 0.9 %
10 SYRINGE (ML) INJECTION AS NEEDED
Status: DISCONTINUED | OUTPATIENT
Start: 2025-03-13 | End: 2025-03-13 | Stop reason: HOSPADM

## 2025-03-13 RX ORDER — SODIUM CHLORIDE 9 MG/ML
40 INJECTION, SOLUTION INTRAVENOUS AS NEEDED
Status: DISCONTINUED | OUTPATIENT
Start: 2025-03-13 | End: 2025-03-13 | Stop reason: HOSPADM

## 2025-03-13 RX ORDER — ACETAMINOPHEN 325 MG/1
650 TABLET ORAL EVERY 4 HOURS PRN
Status: DISCONTINUED | OUTPATIENT
Start: 2025-03-13 | End: 2025-03-13 | Stop reason: HOSPADM

## 2025-03-13 RX ORDER — IOPAMIDOL 755 MG/ML
INJECTION, SOLUTION INTRAVASCULAR
Status: DISCONTINUED | OUTPATIENT
Start: 2025-03-13 | End: 2025-03-13 | Stop reason: HOSPADM

## 2025-03-13 RX ORDER — LIDOCAINE HYDROCHLORIDE 20 MG/ML
INJECTION, SOLUTION INFILTRATION; PERINEURAL
Status: DISCONTINUED | OUTPATIENT
Start: 2025-03-13 | End: 2025-03-13 | Stop reason: HOSPADM

## 2025-03-13 RX ORDER — MIDAZOLAM HYDROCHLORIDE 1 MG/ML
INJECTION, SOLUTION INTRAMUSCULAR; INTRAVENOUS
Status: DISCONTINUED | OUTPATIENT
Start: 2025-03-13 | End: 2025-03-13 | Stop reason: HOSPADM

## 2025-03-13 RX ORDER — FENTANYL CITRATE 50 UG/ML
INJECTION, SOLUTION INTRAMUSCULAR; INTRAVENOUS
Status: DISCONTINUED | OUTPATIENT
Start: 2025-03-13 | End: 2025-03-13 | Stop reason: HOSPADM

## 2025-03-13 RX ADMIN — SODIUM CHLORIDE 75 ML/HR: 9 INJECTION, SOLUTION INTRAVENOUS at 07:53

## 2025-03-13 NOTE — DISCHARGE INSTRUCTIONS
[facility]  [address] [place] [address]      Coronary Angiogram (Femoral Approach) After Care     Refer to this sheet in the next few weeks. These instructions provide you with information on caring for yourself after your procedure. Your health care provider may also give you more specific instructions. Your treatment has been planned according to current medical practices, but problems sometimes occur. Call your health care provider if you have any problems or questions after your procedure.      What to Expect After the Procedure:  After your procedure, it is typical to have the following sensations:  Minor discomfort or tenderness and a small bump at the catheter insertion site. The bump should usually decrease in size and tenderness within 1 to 2 weeks.  Any bruising will usually fade within 2 to 4 weeks.    Home Care Instructions:  Do not apply powder or lotion to the site.  Do not take baths, swim, or use a hot tub until your health care provider approves and the site is completely healed.  Do not bend, squat, or lift anything over 20 lb (9 kg) or as directed by your health care provider. However, we recommend lifting nothing heavier than a gallon of milk.    You may shower 24 hours after the procedure. Remove the bandage (dressing) and gently wash the site with plain soap and water. Gently pat the site dry. You may apply a band aid daily for 2 days if desired.    Inspect the site at least twice daily.  Increase your fluid intake for the next 2 days.    Limit your activity for the first 48 hours. .    Avoid strenuous activity for 1 week or as advised by your physician.    Follow instructions about when you can drive or return to work as directed by your physician.    Hold direct pressure over the site when you cough, sneeze, laugh or change positions.  Do this for the next 2 days.    Do not operate machinery or power tools for 24 hours.  A responsible adult should be with you for the  first 24 hours after you arrive home. Do not make any important legal decisions or sign legal papers for 24 hours.  Do not drink alcohol for 24 hours.  Metformin or any medications containing Metformin should not be taken for 48 hours after your procedure.      Call Your Doctor If:  You have drainage (other than a small amount of blood on the dressing).  You have chills or a fever > 101.  You have redness, warmth, swelling(larger than a walnut), or pain at the insertion site  You develop chest pain or shortness of breath, feel faint, or pass out.  You develop pain, discoloration, coldness, numbness, tingling, or severe bruising in the leg that held the catheter.  You develop bleeding from any other place, such as the bowels.  You have heavy bleeding from the site, hold pressure on the site for 20 minutes.  If the bleeding stops, apply a fresh bandage and call your cardiologist.  However, if you continue to have bleeding, call 911.  You have any symptoms of a stroke.  Remember BE FAST  B-balance. Sudden trouble walking or loss of balance.  E-eyes.  Sudden changes in how you see or a sudden onset of a very bad headache.   F-face. Sudden weakness or loss of feeling of the face or facial droop on one side.   A-arms Sudden weakness or numbness in one arm.  One arm drifts down if they are both held out in front of you. This happens suddenly and usually on one side of the body.  S-speech.  Sudden trouble speaking, slurred speech or trouble understanding what people are saying.   T-time  Time to call emergency services.  Write down the symptoms and the time they started.        Make Sure You:  Understand these instructions.  Will watch your condition.  Will get help right away if you are not doing well or get worse.[facility]  [address] [place] [address]    Coronary Angiogram (Radial/Ulnar Approach) After Care    Refer to this sheet in the next few weeks. These instructions provide you with  information on caring for yourself after your procedure. Your caregiver may also give you more specific instructions. Your treatment has been planned according to current medical practices, but problems sometimes occur. Call your caregiver if you have any problems or questions after your procedure.    Home Care Instructions:  You may shower the day after the procedure. Remove the bandage (dressing) and gently wash the site with plain soap and water. Gently pat the site dry. You may apply a band aid daily for 2 days if desired.    Do not apply powder or lotion to the site.  Do not submerge the affected site in water for 3 to 5 days or until the site is completely healed.   Do not lift, push or pull anything over 5 pounds for 5 days after your procedure or as directed by your physician.  As a reference, a gallon of milk weighs 8 pounds.   Inspect the site at least twice daily. You may notice some bruising at the site and it may be tender for 1 to 2 weeks.     Increase your fluid intake for the next 2 days.    Keep arm elevated for 24 hours. For the remainder of the day, keep your arm in “Pledge of Allegiance” position when up and about.     You may drive 24 hours after the procedure unless otherwise instructed by your caregiver.  Do not operate machinery or power tools for 24 hours.  A responsible adult should be with you for the first 24 hours after you arrive home. Do not make any important legal decisions or sign legal papers for 24 hours.  Do not drink alcohol for 24 hours.    Metformin or any medications containing Metformin should not be taken for 48 hours after your procedure.      Call Your Doctor if:   You have unusual pain at the radial/ulnar (wrist) site.  You have redness, warmth, swelling, or pain at the radial/ulnar (wrist) site.  You have drainage (other than a small amount of blood on the dressing).  `You have chills or a fever > 101.  Your arm becomes pale or dark, cool, tingly, or numb.  You develop  chest pain, shortness of breath, feel faint or pass out.    You have heavy bleeding from the site, hold pressure on the site for 20 minutes.  If the bleeding stops, apply a fresh bandage and call your cardiologist.  However, if you        continue to have bleeding, call 911 and continue to apply pressure to the site.   You have any symptoms of a stroke.  Remember BE FAST  B-balance. Sudden trouble walking or loss of balance.  E-eyes.  Sudden changes in how you see or a sudden onset of a very bad headache.   F-face. Sudden weakness or loss of feeling of the face or facial droop on one side.   A-arms Sudden weakness or numbness in one arm.  One arm drifts down if they are both held out in front of you. This happens suddenly and usually on one side of the body.   S-speech.  Sudden trouble speaking, slurred speech or trouble understanding what are saying.   T-time  Time to call emergency services.  Write down the symptoms and the time they started.

## 2025-03-13 NOTE — TELEPHONE ENCOUNTER
Caller: GERARD    Relationship:     Best call back number:  [phone]      PATIENT WOULD LIKE TO KNOW IF SHE CAN TAKE TYLENOL OR ADVIL AS HER BACK IS HURTING

## 2025-03-13 NOTE — Clinical Note
Hemostasis started on the right radial artery. R-Band was used in achieving hemostasis. Radial compression device applied to vessel. Hemostasis achieved successfully. Closure device additional comment: MENDEZ palacio 12cc

## 2025-03-14 LAB — LPA SERPL-SCNC: 37 NMOL/L

## 2025-03-16 LAB — APO B SERPL-MCNC: 98 MG/DL

## 2025-03-17 ENCOUNTER — PREP FOR SURGERY (OUTPATIENT)
Dept: OTHER | Facility: HOSPITAL | Age: 80
End: 2025-03-17
Payer: MEDICARE

## 2025-03-17 ENCOUNTER — TELEPHONE (OUTPATIENT)
Dept: CARDIOLOGY | Facility: HOSPITAL | Age: 80
End: 2025-03-17

## 2025-03-17 DIAGNOSIS — I50.32 CHRONIC DIASTOLIC CONGESTIVE HEART FAILURE: ICD-10-CM

## 2025-03-17 DIAGNOSIS — R79.1 ABNORMAL COAGULATION PROFILE: ICD-10-CM

## 2025-03-17 DIAGNOSIS — R79.9 ABNORMAL FINDING OF BLOOD CHEMISTRY, UNSPECIFIED: ICD-10-CM

## 2025-03-17 DIAGNOSIS — I35.0 AORTIC STENOSIS, SEVERE: Primary | ICD-10-CM

## 2025-03-17 DIAGNOSIS — I11.0 HYPERTENSIVE HEART DISEASE WITH HEART FAILURE: ICD-10-CM

## 2025-03-17 DIAGNOSIS — I35.0 NONRHEUMATIC AORTIC VALVE STENOSIS: ICD-10-CM

## 2025-03-17 DIAGNOSIS — I35.0 NONRHEUMATIC AORTIC VALVE STENOSIS: Primary | ICD-10-CM

## 2025-03-17 RX ORDER — CHLORHEXIDINE GLUCONATE ORAL RINSE 1.2 MG/ML
15 SOLUTION DENTAL ONCE
OUTPATIENT
Start: 2025-03-17 | End: 2025-03-17

## 2025-03-17 RX ORDER — CHLORHEXIDINE GLUCONATE ORAL RINSE 1.2 MG/ML
15 SOLUTION DENTAL EVERY 12 HOURS
OUTPATIENT
Start: 2025-03-17 | End: 2025-03-18

## 2025-03-17 NOTE — TELEPHONE ENCOUNTER
Mrs. Jorge called earlier requesting TAVR 4/8/25.      After confirming the procedure and PAT was scheduled, I called and left a voicemail for Mrs Jorge.  Pre-Admission Testing is scheduled for 4/3/25 at 0800.  She is advised to take all meds and have breakfast that morning.  We will review her home meds and directions for TAVR during her PAT visit.  She has the [facility] contact information and is encouraged to call for any questions or concerns.

## 2025-03-24 ENCOUNTER — CLINICAL SUPPORT (OUTPATIENT)
Dept: INTERNAL MEDICINE | Facility: CLINIC | Age: 80
End: 2025-03-24
Payer: MEDICARE

## 2025-03-24 DIAGNOSIS — E53.8 B12 DEFICIENCY: Primary | ICD-10-CM

## 2025-03-24 PROCEDURE — 96372 THER/PROPH/DIAG INJ SC/IM: CPT | Performed by: INTERNAL MEDICINE

## 2025-03-24 RX ADMIN — CYANOCOBALAMIN 1000 MCG: 1000 INJECTION, SOLUTION INTRAMUSCULAR; SUBCUTANEOUS at 08:50

## 2025-04-03 ENCOUNTER — PRE-ADMISSION TESTING (OUTPATIENT)
Dept: PREADMISSION TESTING | Facility: HOSPITAL | Age: 80
End: 2025-04-03
Payer: MEDICARE

## 2025-04-03 ENCOUNTER — ANESTHESIA EVENT (OUTPATIENT)
Dept: PERIOP | Facility: HOSPITAL | Age: 80
End: 2025-04-03
Payer: MEDICARE

## 2025-04-03 ENCOUNTER — HOSPITAL ENCOUNTER (OUTPATIENT)
Dept: GENERAL RADIOLOGY | Facility: HOSPITAL | Age: 80
Discharge: HOME | End: 2025-04-03
Payer: MEDICARE

## 2025-04-03 VITALS
WEIGHT: 117 LBS | BODY MASS INDEX: 22.09 KG/M2 | RESPIRATION RATE: 18 BRPM | HEIGHT: 61 IN | HEART RATE: 97 BPM | OXYGEN SATURATION: 100 % | TEMPERATURE: 98.2 F | DIASTOLIC BLOOD PRESSURE: 88 MMHG | SYSTOLIC BLOOD PRESSURE: 157 MMHG

## 2025-04-03 DIAGNOSIS — R79.1 ABNORMAL COAGULATION PROFILE: ICD-10-CM

## 2025-04-03 DIAGNOSIS — R79.9 ABNORMAL FINDING OF BLOOD CHEMISTRY, UNSPECIFIED: ICD-10-CM

## 2025-04-03 DIAGNOSIS — I11.0 HYPERTENSIVE HEART DISEASE WITH HEART FAILURE: ICD-10-CM

## 2025-04-03 DIAGNOSIS — I35.0 NONRHEUMATIC AORTIC VALVE STENOSIS: ICD-10-CM

## 2025-04-03 LAB
ABO GROUP BLD: NORMAL
ALBUMIN SERPL-MCNC: 4.3 G/DL (ref 3.5–5.2)
ALBUMIN/GLOB SERPL: 1.8 G/DL
ALP SERPL-CCNC: 95 U/L (ref 39–117)
ALT SERPL W P-5'-P-CCNC: 13 U/L (ref 1–33)
ANION GAP SERPL CALCULATED.3IONS-SCNC: 10.5 MMOL/L (ref 5–15)
APTT PPP: 27.4 SECONDS (ref 22.7–35.4)
AST SERPL-CCNC: 23 U/L (ref 1–32)
BACTERIA UR QL AUTO: NORMAL /HPF
BASOPHILS # BLD AUTO: 0.07 10*3/MM3 (ref 0–0.2)
BASOPHILS NFR BLD AUTO: 1.1 % (ref 0–1.5)
BILIRUB SERPL-MCNC: 0.5 MG/DL (ref 0–1.2)
BILIRUB UR QL STRIP: NEGATIVE
BLD GP AB SCN SERPL QL: NEGATIVE
BUN SERPL-MCNC: 16 MG/DL (ref 8–23)
BUN/CREAT SERPL: 25 (ref 7–25)
CALCIUM SPEC-SCNC: 9.7 MG/DL (ref 8.6–10.5)
CHLORIDE SERPL-SCNC: 106 MMOL/L (ref 98–107)
CLARITY UR: CLEAR
CLOSE TME COLL+ADP + EPINEP PNL BLD: 98 % (ref 86–100)
CO2 SERPL-SCNC: 21.5 MMOL/L (ref 22–29)
COLOR UR: YELLOW
CREAT SERPL-MCNC: 0.64 MG/DL (ref 0.57–1)
DEPRECATED RDW RBC AUTO: 41.6 FL (ref 37–54)
EGFRCR SERPLBLD CKD-EPI 2021: 88.9 ML/MIN/1.73
EOSINOPHIL # BLD AUTO: 0.32 10*3/MM3 (ref 0–0.4)
EOSINOPHIL NFR BLD AUTO: 4.9 % (ref 0.3–6.2)
ERYTHROCYTE [DISTWIDTH] IN BLOOD BY AUTOMATED COUNT: 13.3 % (ref 12.3–15.4)
GLOBULIN UR ELPH-MCNC: 2.4 GM/DL
GLUCOSE SERPL-MCNC: 112 MG/DL (ref 65–99)
GLUCOSE UR STRIP-MCNC: NEGATIVE MG/DL
HBA1C MFR BLD: 5 % (ref 4.8–5.6)
HCT VFR BLD AUTO: 31.6 % (ref 34–46.6)
HGB BLD-MCNC: 10.6 G/DL (ref 12–15.9)
HGB UR QL STRIP.AUTO: NEGATIVE
HYALINE CASTS UR QL AUTO: NORMAL /LPF
IMM GRANULOCYTES # BLD AUTO: 0.02 10*3/MM3 (ref 0–0.05)
IMM GRANULOCYTES NFR BLD AUTO: 0.3 % (ref 0–0.5)
INR PPP: 1.16 (ref 0.9–1.1)
KETONES UR QL STRIP: NEGATIVE
LEUKOCYTE ESTERASE UR QL STRIP.AUTO: ABNORMAL
LYMPHOCYTES # BLD AUTO: 1.34 10*3/MM3 (ref 0.7–3.1)
LYMPHOCYTES NFR BLD AUTO: 20.7 % (ref 19.6–45.3)
MCH RBC QN AUTO: 28.9 PG (ref 26.6–33)
MCHC RBC AUTO-ENTMCNC: 33.5 G/DL (ref 31.5–35.7)
MCV RBC AUTO: 86.1 FL (ref 79–97)
MONOCYTES # BLD AUTO: 0.59 10*3/MM3 (ref 0.1–0.9)
MONOCYTES NFR BLD AUTO: 9.1 % (ref 5–12)
NEUTROPHILS NFR BLD AUTO: 4.14 10*3/MM3 (ref 1.7–7)
NEUTROPHILS NFR BLD AUTO: 63.9 % (ref 42.7–76)
NITRITE UR QL STRIP: NEGATIVE
NRBC BLD AUTO-RTO: 0 /100 WBC (ref 0–0.2)
NT-PROBNP SERPL-MCNC: 855 PG/ML (ref 0–1800)
PH UR STRIP.AUTO: 5.5 [PH] (ref 5–8)
PLATELET # BLD AUTO: 343 10*3/MM3 (ref 140–450)
PMV BLD AUTO: 8.9 FL (ref 6–12)
POTASSIUM SERPL-SCNC: 4 MMOL/L (ref 3.5–5.2)
PROT SERPL-MCNC: 6.7 G/DL (ref 6–8.5)
PROT UR QL STRIP: NEGATIVE
PROTHROMBIN TIME: 14.7 SECONDS (ref 11.7–14.2)
QT INTERVAL: 408 MS
QTC INTERVAL: 435 MS
RBC # BLD AUTO: 3.67 10*6/MM3 (ref 3.77–5.28)
RBC # UR STRIP: NORMAL /HPF
REF LAB TEST METHOD: NORMAL
RH BLD: POSITIVE
SODIUM SERPL-SCNC: 138 MMOL/L (ref 136–145)
SP GR UR STRIP: 1.01 (ref 1–1.03)
SQUAMOUS #/AREA URNS HPF: NORMAL /HPF
T&S EXPIRATION DATE: NORMAL
UROBILINOGEN UR QL STRIP: ABNORMAL
WBC # UR STRIP: NORMAL /HPF
WBC NRBC COR # BLD AUTO: 6.48 10*3/MM3 (ref 3.4–10.8)

## 2025-04-03 PROCEDURE — 81001 URINALYSIS AUTO W/SCOPE: CPT

## 2025-04-03 PROCEDURE — 85025 COMPLETE CBC W/AUTO DIFF WBC: CPT

## 2025-04-03 PROCEDURE — 85730 THROMBOPLASTIN TIME PARTIAL: CPT

## 2025-04-03 PROCEDURE — 86850 RBC ANTIBODY SCREEN: CPT

## 2025-04-03 PROCEDURE — 36415 COLL VENOUS BLD VENIPUNCTURE: CPT

## 2025-04-03 PROCEDURE — 86900 BLOOD TYPING SEROLOGIC ABO: CPT

## 2025-04-03 PROCEDURE — 93010 ELECTROCARDIOGRAM REPORT: CPT | Performed by: INTERNAL MEDICINE

## 2025-04-03 PROCEDURE — 71046 X-RAY EXAM CHEST 2 VIEWS: CPT

## 2025-04-03 PROCEDURE — 86901 BLOOD TYPING SEROLOGIC RH(D): CPT

## 2025-04-03 PROCEDURE — 93005 ELECTROCARDIOGRAM TRACING: CPT

## 2025-04-03 PROCEDURE — 85610 PROTHROMBIN TIME: CPT

## 2025-04-03 PROCEDURE — 83036 HEMOGLOBIN GLYCOSYLATED A1C: CPT

## 2025-04-03 PROCEDURE — 80053 COMPREHEN METABOLIC PANEL: CPT

## 2025-04-03 PROCEDURE — 85576 BLOOD PLATELET AGGREGATION: CPT

## 2025-04-03 PROCEDURE — 83880 ASSAY OF NATRIURETIC PEPTIDE: CPT

## 2025-04-03 RX ORDER — CHLORHEXIDINE GLUCONATE ORAL RINSE 1.2 MG/ML
15 SOLUTION DENTAL EVERY 12 HOURS
Status: DISPENSED | OUTPATIENT
Start: 2025-04-03 | End: 2025-04-04

## 2025-04-03 NOTE — DISCHARGE INSTRUCTIONS
Take the following medications the morning of surgery with a small sip of water: TO BE INSTRUCTED PER CARDIAC NURSE.       If you are on prescription narcotic pain medication to control your pain you may also take that medication the morning of surgery.    General Instructions:  Do not eat or drink anything after midnight the night before surgery.  Infants may have breast milk up to four hours before surgery.  Infants drinking formula may drink formula up to six hours before surgery.   Patients who avoid smoking, chewing tobacco and alcohol for 4 weeks prior to surgery have a reduced risk of post-operative complications.  Quit smoking as many days before surgery as you can.  Do not smoke, use chewing tobacco or drink alcohol the day of surgery.   If applicable bring your C-PAP/ BI-PAP machine in with you to preop day of surgery.  Bring any papers given to you in the doctor’s office.  Wear clean comfortable clothes.  Do not wear contact lenses, false eyelashes or make-up.  Bring a case for your glasses.   Bring crutches or walker if applicable.  Remove all piercings.  Leave jewelry and any other valuables at home.  Hair extensions with metal clips must be removed prior to surgery.  The Pre-Admission Testing nurse will instruct you to bring medications if unable to obtain an accurate list in Pre-Admission Testing.    Day of surgery you will need to let the preoperative nurse know the last time you took each of your medications.  To ensure a safe environment for patients and staff, we kindly ask that children under the age of 16 not accompany patients.  If you must bring a dependent child or dependent adult please ensure a responsible adult, other than yourself, is present to supervise them.      If you were given a blood bank ID arm band remember to bring it with you the day of surgery.    Preventing a Surgical Site Infection:  For 2 to 3 days before surgery, avoid shaving with a razor because the razor can irritate  skin and make it easier to develop an infection.    Any areas of open skin can increase the risk of a post-operative wound infection by allowing bacteria to enter and travel throughout the body.  Notify your surgeon if you have any skin wounds / rashes even if it is not near the expected surgical site.  The area will need assessed to determine if surgery should be delayed until it is healed.  The night prior to surgery shower using a fresh bar of anti-bacterial soap (such as Dial) and clean washcloth.  Sleep in a clean bed with clean clothing.  Do not allow pets to sleep with you.  Shower on the morning of surgery using a fresh bar of anti-bacterial soap (such as Dial) and clean washcloth.  Dry with a clean towel and dress in clean clothing.  Ask your surgeon if you will be receiving antibiotics prior to surgery.  Make sure you, your family, and all healthcare providers clean their hands with soap and water or an alcohol based hand  before caring for you or your wound.    Day of surgery:  Your arrival time is approximately two hours before your scheduled surgery time.  Please note if you have an early arrival time the surgery doors do not open before 5:00 AM.  Upon arrival, a Pre-op nurse and Anesthesiologist will review your health history, obtain vital signs, and answer questions you may have.  The only belongings needed at this time will be your home medications and if applicable your C-PAP/BI-PAP machine.  A Pre-op nurse will start an IV and you may receive medication in preparation for surgery, including something to help you relax.      Please be aware that surgery does come with discomfort.  We want to make every effort to control your discomfort so please discuss any uncontrolled symptoms with your nurse.   Your doctor will most likely have prescribed pain medications.      If you are going home after surgery you will receive individualized written care instructions before being discharged.  A  responsible adult must drive you to and from the hospital on the day of your surgery and ideally stay with you through the night.  Discharge prescriptions can be filled by the hospital pharmacy during regular pharmacy hours.  If you are having surgery late in the day/evening your prescription may be e-prescribed to your pharmacy.  Please verify your pharmacy hours or chose a 24 hour pharmacy to avoid not having access to your prescription because your pharmacy has closed for the day.    If you are staying overnight following surgery, you will be transported to your hospital room following the recovery period.  [facility] has all private rooms.    If you have any questions please call Pre-Admission Testing at [phone].  Deductibles and co-payments are collected on the day of service. Please be prepared to pay the required co-pay, deductible or deposit on the day of service as defined by your plan.    Call your surgeon immediately if you experience any of the following symptoms:  Sore Throat  Shortness of Breath or difficulty breathing  Cough  Chills  Body soreness or muscle pain  Headache  Fever  New loss of taste or smell  Do not arrive for your surgery ill.  Your procedure will need to be rescheduled to another time.  You will need to call your physician before the day of surgery to avoid any unnecessary exposure to hospital staff as well as other patients.      BACTROBAN NASAL OINTMENT  There are many germs normally in your nose. Bactroban is an ointment that will help reduce these germs. Please follow these instructions for Bactroban use:      ____The day before surgery in the evening              Date________    ____The day of surgery in the morning    Date________    **Squirt ½ package of Bactroban Ointment onto a cotton applicator and apply to inside of 1st nostril.  Squirt the remaining Bactroban and apply to the inside of the other nostril.    PERIDEX- ORAL:  Use only if your surgeon has  ordered  Use the night before and morning of surgery - Swish, gargle, and spit - do not swallow.

## 2025-04-03 NOTE — ANESTHESIA PREPROCEDURE EVALUATION
Anesthesia Evaluation     Patient summary reviewed and Nursing notes reviewed                Airway   Mallampati: II  TM distance: >3 FB  Neck ROM: limited  Dental      Comment: Lower implants. Upper dentures  Patient adamant to keep bridge in    Pulmonary    (+) asthma (well controlled),  Cardiovascular     (+) hypertension, valvular problems/murmurs AS, CAD, CHF Diastolic >=55%, hyperlipidemia    ROS comment: TTE 1/2024  ·  Left ventricular systolic function is normal. Calculated left ventricular EF = 61%  ·  Left ventricular wall thickness is consistent with severe concentric hypertrophy.  ·  Left ventricular diastolic function is consistent with (grade Ia w/high LAP) impaired relaxation.  ·  The left atrial cavity is mild to moderately dilated.  ·  There is severe calcification of the aortic valve mainly affecting the non-coronary, left coronary and right coronary cusp(s). There is severe thickening of the non-coronary, left coronary and right coronary cusp(s) of the aortic valve. The aortic valve was poorly visualized but appears trileaflet. Mild aortic valve regurgitation is present. Severe aortic valve stenosis is present. Aortic valve area is 0.60 cm2. The peak aortic velocity was measured in the apical view. Peak velocity of the flow distal to the aortic valve is 437 cm/s. Aortic valve maximum pressure gradient is 76.4 mmHg. Aortic valve mean pressure gradient is 44.2 mmHg. Aortic valve dimensionless index is 0.10 .    Cath 2/2025: SUMMARY: Severe aortic stenosis with mild mid LAD disease it is not obstructive and should be managed medically.  Very tortuous right radial access this is not amenable to [place] device      Neuro/Psych  (+) psychiatric history Anxiety  GI/Hepatic/Renal/Endo    (+) thyroid problem hypothyroidism    Musculoskeletal     Abdominal    Substance History      OB/GYN          Other   arthritis,                   Anesthesia Plan    ASA 4     MAC and [place]     intravenous induction      Anesthetic plan, risks, benefits, and alternatives have been provided, discussed and informed consent has been obtained with: patient.    Plan discussed with attending.      CODE STATUS:

## 2025-04-03 NOTE — NURSING NOTE
I met with Mrs. Jorge during Pre-Admission Testing for planned TAVR on 4/8/25. She just met with Anesthesia team and has been given instructions for the evening before TAVR and day of.  Mrs. Jorge will be NPO after midnight and plans to arrive with daughter, Narda, by 5 AM. There are a few meds listed,but she only takes Aspirin, Coreg and Levothyroxine.  She will take all meds on Monday as previously directed and nothing on Tuesday morning.We discussed the TAVR procedure and post op care.  She is a little nervous because this is the first surgery or procedure she's had since her  passed away 3 years ago. She is looking forward to being able to walk her dog without getting short of air.      Mrs. Jorge has our contact information and is encouraged to call if any questions or concerns arise.

## 2025-04-08 ENCOUNTER — ANESTHESIA (OUTPATIENT)
Dept: PERIOP | Facility: HOSPITAL | Age: 80
End: 2025-04-08
Payer: MEDICARE

## 2025-04-08 ENCOUNTER — ANCILLARY PROCEDURE (OUTPATIENT)
Dept: PERIOP | Facility: HOSPITAL | Age: 80
End: 2025-04-08
Payer: MEDICARE

## 2025-04-08 ENCOUNTER — HOSPITAL ENCOUNTER (INPATIENT)
Facility: HOSPITAL | Age: 80
LOS: 1 days | Discharge: HOME | End: 2025-04-09
Attending: THORACIC SURGERY (CARDIOTHORACIC VASCULAR SURGERY) | Admitting: THORACIC SURGERY (CARDIOTHORACIC VASCULAR SURGERY)
Payer: MEDICARE

## 2025-04-08 DIAGNOSIS — I50.32 CHRONIC DIASTOLIC CONGESTIVE HEART FAILURE: ICD-10-CM

## 2025-04-08 DIAGNOSIS — I35.0 AORTIC STENOSIS, SEVERE: ICD-10-CM

## 2025-04-08 DIAGNOSIS — Z95.2 S/P TAVR (TRANSCATHETER AORTIC VALVE REPLACEMENT): ICD-10-CM

## 2025-04-08 DIAGNOSIS — I35.0 NONRHEUMATIC AORTIC VALVE STENOSIS: ICD-10-CM

## 2025-04-08 DIAGNOSIS — I35.0 NONRHEUMATIC AORTIC VALVE STENOSIS: Primary | ICD-10-CM

## 2025-04-08 DIAGNOSIS — E53.8 B12 DEFICIENCY: ICD-10-CM

## 2025-04-08 DIAGNOSIS — Z95.2 S/P TAVR (TRANSCATHETER AORTIC VALVE REPLACEMENT): Primary | ICD-10-CM

## 2025-04-08 LAB
ACT BLD: 124 SECONDS (ref 82–152)
ACT BLD: 135 SECONDS (ref 82–152)
ACT BLD: 250 SECONDS (ref 82–152)
ACT BLD: 377 SECONDS (ref 82–152)
AORTIC DIMENSIONLESS INDEX: 0.66 (DI)
AV MEAN PRESS GRAD SYS DOP V1V2: 5.2 MMHG
AV VMAX SYS DOP: 174.2 CM/SEC
BASE EXCESS BLDA CALC-SCNC: -3 MMOL/L (ref -5–5)
BASE EXCESS BLDA CALC-SCNC: -4 MMOL/L (ref -5–5)
BASE EXCESS BLDA CALC-SCNC: -8 MMOL/L (ref -5–5)
BH CV ECHO MEAS - AO MAX PG: 12.1 MMHG
BH CV ECHO MEAS - AO V2 VTI: 38.7 CM
BH CV ECHO MEAS - AVA(I,D): 1.81 CM2
BH CV ECHO MEAS - LV MAX PG: 4.4 MMHG
BH CV ECHO MEAS - LV MEAN PG: 2.5 MMHG
BH CV ECHO MEAS - LV V1 MAX: 104.8 CM/SEC
BH CV ECHO MEAS - LV V1 VTI: 25.4 CM
BH CV ECHO MEAS - LVOT AREA: 2.7 CM2
BH CV ECHO MEAS - LVOT DIAM: 1.87 CM
BH CV ECHO MEAS - SV(LVOT): 69.9 ML
BH CV ECHO MEAS - SVI(LVOT): 47 ML/M2
CO2 BLDA-SCNC: 18 MMOL/L (ref 24–29)
CO2 BLDA-SCNC: 23 MMOL/L (ref 24–29)
CO2 BLDA-SCNC: 23 MMOL/L (ref 24–29)
GLUCOSE BLDC GLUCOMTR-MCNC: 104 MG/DL (ref 70–130)
GLUCOSE BLDC GLUCOMTR-MCNC: 105 MG/DL (ref 70–130)
GLUCOSE BLDC GLUCOMTR-MCNC: 84 MG/DL (ref 70–130)
HCO3 BLDA-SCNC: 17 MMOL/L (ref 22–26)
HCO3 BLDA-SCNC: 21.5 MMOL/L (ref 22–26)
HCO3 BLDA-SCNC: 22.2 MMOL/L (ref 22–26)
HCT VFR BLDA CALC: 19 % (ref 38–51)
HCT VFR BLDA CALC: 20 % (ref 38–51)
HCT VFR BLDA CALC: 24 % (ref 38–51)
HGB BLDA-MCNC: 6.5 G/DL (ref 12–17)
HGB BLDA-MCNC: 6.8 G/DL (ref 12–17)
HGB BLDA-MCNC: 8.2 G/DL (ref 12–17)
PCO2 BLDA: 28.9 MM HG (ref 35–45)
PCO2 BLDA: 36.7 MM HG (ref 35–45)
PCO2 BLDA: 39.4 MM HG (ref 35–45)
PH BLDA: 7.36 PH UNITS (ref 7.35–7.6)
PH BLDA: 7.38 PH UNITS (ref 7.35–7.6)
PH BLDA: 7.38 PH UNITS (ref 7.35–7.6)
PO2 BLDA: 189 MMHG (ref 80–105)
PO2 BLDA: 217 MMHG (ref 80–105)
PO2 BLDA: 238 MMHG (ref 80–105)
POTASSIUM BLDA-SCNC: 2.5 MMOL/L (ref 3.5–4.9)
POTASSIUM BLDA-SCNC: 3.2 MMOL/L (ref 3.5–4.9)
POTASSIUM BLDA-SCNC: 4 MMOL/L (ref 3.5–4.9)
QT INTERVAL: 449 MS
QTC INTERVAL: 516 MS
SAO2 % BLDA: 100 % (ref 95–98)

## 2025-04-08 PROCEDURE — C1894 INTRO/SHEATH, NON-LASER: HCPCS | Performed by: THORACIC SURGERY (CARDIOTHORACIC VASCULAR SURGERY)

## 2025-04-08 PROCEDURE — 25010000002 NICARDIPINE 2.5 MG/ML SOLUTION 10 ML VIAL: Performed by: STUDENT IN AN ORGANIZED HEALTH CARE EDUCATION/TRAINING PROGRAM

## 2025-04-08 PROCEDURE — 93321 DOPPLER ECHO F-UP/LMTD STD: CPT

## 2025-04-08 PROCEDURE — 93010 ELECTROCARDIOGRAM REPORT: CPT | Performed by: STUDENT IN AN ORGANIZED HEALTH CARE EDUCATION/TRAINING PROGRAM

## 2025-04-08 PROCEDURE — 85014 HEMATOCRIT: CPT

## 2025-04-08 PROCEDURE — 33361 REPLACE AORTIC VALVE PERQ: CPT | Performed by: INTERNAL MEDICINE

## 2025-04-08 PROCEDURE — 33361 REPLACE AORTIC VALVE PERQ: CPT | Performed by: THORACIC SURGERY (CARDIOTHORACIC VASCULAR SURGERY)

## 2025-04-08 PROCEDURE — 25010000002 LIDOCAINE 2% SOLUTION: Performed by: STUDENT IN AN ORGANIZED HEALTH CARE EDUCATION/TRAINING PROGRAM

## 2025-04-08 PROCEDURE — 25010000002 HEPARIN (PORCINE) PER 1000 UNITS: Performed by: STUDENT IN AN ORGANIZED HEALTH CARE EDUCATION/TRAINING PROGRAM

## 2025-04-08 PROCEDURE — 25010000002 PROPOFOL 10 MG/ML EMULSION: Performed by: STUDENT IN AN ORGANIZED HEALTH CARE EDUCATION/TRAINING PROGRAM

## 2025-04-08 PROCEDURE — C1769 GUIDE WIRE: HCPCS | Performed by: THORACIC SURGERY (CARDIOTHORACIC VASCULAR SURGERY)

## 2025-04-08 PROCEDURE — B245ZZ4 ULTRASONOGRAPHY OF LEFT HEART, TRANSESOPHAGEAL: ICD-10-PCS | Performed by: INTERNAL MEDICINE

## 2025-04-08 PROCEDURE — 85347 COAGULATION TIME ACTIVATED: CPT

## 2025-04-08 PROCEDURE — 82803 BLOOD GASES ANY COMBINATION: CPT

## 2025-04-08 PROCEDURE — S0260 H&P FOR SURGERY: HCPCS | Performed by: INTERNAL MEDICINE

## 2025-04-08 PROCEDURE — 82947 ASSAY GLUCOSE BLOOD QUANT: CPT

## 2025-04-08 PROCEDURE — C1889 IMPLANT/INSERT DEVICE, NOC: HCPCS | Performed by: THORACIC SURGERY (CARDIOTHORACIC VASCULAR SURGERY)

## 2025-04-08 PROCEDURE — 25510000001 IOPAMIDOL PER 1 ML: Performed by: THORACIC SURGERY (CARDIOTHORACIC VASCULAR SURGERY)

## 2025-04-08 PROCEDURE — 93308 TTE F-UP OR LMTD: CPT

## 2025-04-08 PROCEDURE — 93010 ELECTROCARDIOGRAM REPORT: CPT | Performed by: INTERNAL MEDICINE

## 2025-04-08 PROCEDURE — 25810000003 SODIUM CHLORIDE 0.9 % SOLUTION 250 ML FLEX CONT: Performed by: STUDENT IN AN ORGANIZED HEALTH CARE EDUCATION/TRAINING PROGRAM

## 2025-04-08 PROCEDURE — 85018 HEMOGLOBIN: CPT

## 2025-04-08 PROCEDURE — 93005 ELECTROCARDIOGRAM TRACING: CPT | Performed by: INTERNAL MEDICINE

## 2025-04-08 PROCEDURE — 25810000003 SODIUM CHLORIDE 0.9 % SOLUTION 250 ML FLEX CONT: Performed by: PHYSICIAN ASSISTANT

## 2025-04-08 PROCEDURE — 25810000003 SODIUM CHLORIDE 0.9 % SOLUTION: Performed by: INTERNAL MEDICINE

## 2025-04-08 PROCEDURE — 25010000002 MIDAZOLAM PER 1 MG: Performed by: STUDENT IN AN ORGANIZED HEALTH CARE EDUCATION/TRAINING PROGRAM

## 2025-04-08 PROCEDURE — 93325 DOPPLER ECHO COLOR FLOW MAPG: CPT

## 2025-04-08 PROCEDURE — C1760 CLOSURE DEV, VASC: HCPCS | Performed by: THORACIC SURGERY (CARDIOTHORACIC VASCULAR SURGERY)

## 2025-04-08 PROCEDURE — 25010000002 LIDOCAINE 2% SOLUTION: Performed by: THORACIC SURGERY (CARDIOTHORACIC VASCULAR SURGERY)

## 2025-04-08 PROCEDURE — 25010000002 VANCOMYCIN 750 MG RECONSTITUTED SOLUTION 1 EACH VIAL: Performed by: PHYSICIAN ASSISTANT

## 2025-04-08 PROCEDURE — 25010000002 PROTAMINE SULFATE PER 10 MG: Performed by: STUDENT IN AN ORGANIZED HEALTH CARE EDUCATION/TRAINING PROGRAM

## 2025-04-08 PROCEDURE — 25010000002 FENTANYL CITRATE (PF) 50 MCG/ML SOLUTION: Performed by: STUDENT IN AN ORGANIZED HEALTH CARE EDUCATION/TRAINING PROGRAM

## 2025-04-08 PROCEDURE — 25010000002 HYDRALAZINE PER 20 MG: Performed by: INTERNAL MEDICINE

## 2025-04-08 PROCEDURE — 02RF38Z REPLACEMENT OF AORTIC VALVE WITH ZOOPLASTIC TISSUE, PERCUTANEOUS APPROACH: ICD-10-PCS | Performed by: INTERNAL MEDICINE

## 2025-04-08 DEVICE — VLV EVOLUTFX-29 COMM US
Type: IMPLANTABLE DEVICE | Site: HEART | Status: FUNCTIONAL
Brand: EVOLUT™ FX

## 2025-04-08 RX ORDER — LABETALOL HYDROCHLORIDE 5 MG/ML
5 INJECTION, SOLUTION INTRAVENOUS
Status: DISCONTINUED | OUTPATIENT
Start: 2025-04-08 | End: 2025-04-08 | Stop reason: HOSPADM

## 2025-04-08 RX ORDER — SODIUM CHLORIDE 0.9 % (FLUSH) 0.9 %
10 SYRINGE (ML) INJECTION EVERY 12 HOURS SCHEDULED
Status: DISCONTINUED | OUTPATIENT
Start: 2025-04-08 | End: 2025-04-08

## 2025-04-08 RX ORDER — DROPERIDOL 2.5 MG/ML
0.62 INJECTION, SOLUTION INTRAMUSCULAR; INTRAVENOUS
Status: DISCONTINUED | OUTPATIENT
Start: 2025-04-08 | End: 2025-04-08 | Stop reason: HOSPADM

## 2025-04-08 RX ORDER — CYANOCOBALAMIN 1000 UG/ML
1000 INJECTION, SOLUTION INTRAMUSCULAR; SUBCUTANEOUS
Status: DISCONTINUED | OUTPATIENT
Start: 2025-04-08 | End: 2025-04-09 | Stop reason: HOSPADM

## 2025-04-08 RX ORDER — ATROPINE SULFATE 0.4 MG/ML
0.4 INJECTION, SOLUTION INTRAMUSCULAR; INTRAVENOUS; SUBCUTANEOUS ONCE AS NEEDED
Status: DISCONTINUED | OUTPATIENT
Start: 2025-04-08 | End: 2025-04-08 | Stop reason: HOSPADM

## 2025-04-08 RX ORDER — LIDOCAINE HYDROCHLORIDE 20 MG/ML
INJECTION, SOLUTION INFILTRATION; PERINEURAL AS NEEDED
Status: DISCONTINUED | OUTPATIENT
Start: 2025-04-08 | End: 2025-04-08 | Stop reason: SURG

## 2025-04-08 RX ORDER — SODIUM CHLORIDE 9 MG/ML
INJECTION, SOLUTION INTRAVENOUS CONTINUOUS PRN
Status: DISCONTINUED | OUTPATIENT
Start: 2025-04-08 | End: 2025-04-08 | Stop reason: SURG

## 2025-04-08 RX ORDER — IOPAMIDOL 755 MG/ML
INJECTION, SOLUTION INTRAVASCULAR AS NEEDED
Status: DISCONTINUED | OUTPATIENT
Start: 2025-04-08 | End: 2025-04-08 | Stop reason: HOSPADM

## 2025-04-08 RX ORDER — ALBUMIN HUMAN 50 G/1000ML
250 SOLUTION INTRAVENOUS
Status: DISCONTINUED | OUTPATIENT
Start: 2025-04-08 | End: 2025-04-08 | Stop reason: HOSPADM

## 2025-04-08 RX ORDER — IPRATROPIUM BROMIDE AND ALBUTEROL SULFATE 2.5; .5 MG/3ML; MG/3ML
3 SOLUTION RESPIRATORY (INHALATION) ONCE AS NEEDED
Status: DISCONTINUED | OUTPATIENT
Start: 2025-04-08 | End: 2025-04-08 | Stop reason: HOSPADM

## 2025-04-08 RX ORDER — HYDROMORPHONE HYDROCHLORIDE 1 MG/ML
0.25 INJECTION, SOLUTION INTRAMUSCULAR; INTRAVENOUS; SUBCUTANEOUS
Status: DISCONTINUED | OUTPATIENT
Start: 2025-04-08 | End: 2025-04-08 | Stop reason: HOSPADM

## 2025-04-08 RX ORDER — MIDAZOLAM HYDROCHLORIDE 1 MG/ML
1 INJECTION, SOLUTION INTRAMUSCULAR; INTRAVENOUS
Status: DISCONTINUED | OUTPATIENT
Start: 2025-04-08 | End: 2025-04-08

## 2025-04-08 RX ORDER — ONDANSETRON 2 MG/ML
4 INJECTION INTRAMUSCULAR; INTRAVENOUS ONCE AS NEEDED
Status: DISCONTINUED | OUTPATIENT
Start: 2025-04-08 | End: 2025-04-08 | Stop reason: HOSPADM

## 2025-04-08 RX ORDER — FENTANYL CITRATE 50 UG/ML
25 INJECTION, SOLUTION INTRAMUSCULAR; INTRAVENOUS
Status: DISCONTINUED | OUTPATIENT
Start: 2025-04-08 | End: 2025-04-08

## 2025-04-08 RX ORDER — DEXMEDETOMIDINE HYDROCHLORIDE 4 UG/ML
INJECTION, SOLUTION INTRAVENOUS CONTINUOUS PRN
Status: DISCONTINUED | OUTPATIENT
Start: 2025-04-08 | End: 2025-04-08 | Stop reason: SURG

## 2025-04-08 RX ORDER — LEVOTHYROXINE SODIUM 88 UG/1
88 TABLET ORAL DAILY
Status: DISCONTINUED | OUTPATIENT
Start: 2025-04-08 | End: 2025-04-09 | Stop reason: HOSPADM

## 2025-04-08 RX ORDER — CARVEDILOL 3.12 MG/1
12.5 TABLET ORAL ONCE
Status: COMPLETED | OUTPATIENT
Start: 2025-04-08 | End: 2025-04-08

## 2025-04-08 RX ORDER — FLUMAZENIL 0.1 MG/ML
0.2 INJECTION INTRAVENOUS AS NEEDED
Status: DISCONTINUED | OUTPATIENT
Start: 2025-04-08 | End: 2025-04-08 | Stop reason: HOSPADM

## 2025-04-08 RX ORDER — HYDRALAZINE HYDROCHLORIDE 20 MG/ML
5 INJECTION INTRAMUSCULAR; INTRAVENOUS
Status: DISCONTINUED | OUTPATIENT
Start: 2025-04-08 | End: 2025-04-08 | Stop reason: HOSPADM

## 2025-04-08 RX ORDER — NALOXONE HCL 0.4 MG/ML
0.2 VIAL (ML) INJECTION AS NEEDED
Status: DISCONTINUED | OUTPATIENT
Start: 2025-04-08 | End: 2025-04-08 | Stop reason: HOSPADM

## 2025-04-08 RX ORDER — SODIUM CHLORIDE 9 MG/ML
40 INJECTION, SOLUTION INTRAVENOUS AS NEEDED
Status: DISCONTINUED | OUTPATIENT
Start: 2025-04-08 | End: 2025-04-08

## 2025-04-08 RX ORDER — DIPHENHYDRAMINE HYDROCHLORIDE 50 MG/ML
12.5 INJECTION, SOLUTION INTRAMUSCULAR; INTRAVENOUS
Status: DISCONTINUED | OUTPATIENT
Start: 2025-04-08 | End: 2025-04-08 | Stop reason: HOSPADM

## 2025-04-08 RX ORDER — SODIUM CHLORIDE, SODIUM LACTATE, POTASSIUM CHLORIDE, CALCIUM CHLORIDE 600; 310; 30; 20 MG/100ML; MG/100ML; MG/100ML; MG/100ML
9 INJECTION, SOLUTION INTRAVENOUS CONTINUOUS PRN
Status: DISCONTINUED | OUTPATIENT
Start: 2025-04-08 | End: 2025-04-08

## 2025-04-08 RX ORDER — HYDROCODONE BITARTRATE AND ACETAMINOPHEN 5; 325 MG/1; MG/1
1 TABLET ORAL ONCE AS NEEDED
Status: DISCONTINUED | OUTPATIENT
Start: 2025-04-08 | End: 2025-04-08 | Stop reason: HOSPADM

## 2025-04-08 RX ORDER — CHLORHEXIDINE GLUCONATE ORAL RINSE 1.2 MG/ML
15 SOLUTION DENTAL ONCE
Status: COMPLETED | OUTPATIENT
Start: 2025-04-08 | End: 2025-04-08

## 2025-04-08 RX ORDER — SODIUM CHLORIDE 9 MG/ML
125 INJECTION, SOLUTION INTRAVENOUS CONTINUOUS
Status: DISCONTINUED | OUTPATIENT
Start: 2025-04-08 | End: 2025-04-08

## 2025-04-08 RX ORDER — FENTANYL CITRATE 50 UG/ML
INJECTION, SOLUTION INTRAMUSCULAR; INTRAVENOUS AS NEEDED
Status: DISCONTINUED | OUTPATIENT
Start: 2025-04-08 | End: 2025-04-08 | Stop reason: SURG

## 2025-04-08 RX ORDER — PROMETHAZINE HYDROCHLORIDE 25 MG/1
25 SUPPOSITORY RECTAL ONCE AS NEEDED
Status: DISCONTINUED | OUTPATIENT
Start: 2025-04-08 | End: 2025-04-08 | Stop reason: HOSPADM

## 2025-04-08 RX ORDER — HYDRALAZINE HYDROCHLORIDE 20 MG/ML
10 INJECTION INTRAMUSCULAR; INTRAVENOUS ONCE
Status: COMPLETED | OUTPATIENT
Start: 2025-04-08 | End: 2025-04-08

## 2025-04-08 RX ORDER — ACETAMINOPHEN 325 MG/1
650 TABLET ORAL EVERY 4 HOURS PRN
Status: DISCONTINUED | OUTPATIENT
Start: 2025-04-08 | End: 2025-04-09 | Stop reason: HOSPADM

## 2025-04-08 RX ORDER — SODIUM CHLORIDE 0.9 % (FLUSH) 0.9 %
10 SYRINGE (ML) INJECTION AS NEEDED
Status: DISCONTINUED | OUTPATIENT
Start: 2025-04-08 | End: 2025-04-08

## 2025-04-08 RX ORDER — HEPARIN SODIUM 1000 [USP'U]/ML
INJECTION, SOLUTION INTRAVENOUS; SUBCUTANEOUS AS NEEDED
Status: DISCONTINUED | OUTPATIENT
Start: 2025-04-08 | End: 2025-04-08 | Stop reason: SURG

## 2025-04-08 RX ORDER — FENTANYL CITRATE 50 UG/ML
25 INJECTION, SOLUTION INTRAMUSCULAR; INTRAVENOUS
Status: DISCONTINUED | OUTPATIENT
Start: 2025-04-08 | End: 2025-04-08 | Stop reason: HOSPADM

## 2025-04-08 RX ORDER — PROMETHAZINE HYDROCHLORIDE 25 MG/1
25 TABLET ORAL ONCE AS NEEDED
Status: DISCONTINUED | OUTPATIENT
Start: 2025-04-08 | End: 2025-04-08 | Stop reason: HOSPADM

## 2025-04-08 RX ORDER — PROTAMINE SULFATE 10 MG/ML
INJECTION, SOLUTION INTRAVENOUS AS NEEDED
Status: DISCONTINUED | OUTPATIENT
Start: 2025-04-08 | End: 2025-04-08 | Stop reason: SURG

## 2025-04-08 RX ORDER — LIDOCAINE HYDROCHLORIDE 20 MG/ML
INJECTION, SOLUTION INFILTRATION; PERINEURAL AS NEEDED
Status: DISCONTINUED | OUTPATIENT
Start: 2025-04-08 | End: 2025-04-08 | Stop reason: HOSPADM

## 2025-04-08 RX ORDER — PROPOFOL 10 MG/ML
VIAL (ML) INTRAVENOUS AS NEEDED
Status: DISCONTINUED | OUTPATIENT
Start: 2025-04-08 | End: 2025-04-08 | Stop reason: SURG

## 2025-04-08 RX ORDER — EPHEDRINE SULFATE 50 MG/ML
5 INJECTION, SOLUTION INTRAVENOUS ONCE AS NEEDED
Status: DISCONTINUED | OUTPATIENT
Start: 2025-04-08 | End: 2025-04-08 | Stop reason: HOSPADM

## 2025-04-08 RX ORDER — HYDROCODONE BITARTRATE AND ACETAMINOPHEN 7.5; 325 MG/1; MG/1
1 TABLET ORAL EVERY 4 HOURS PRN
Status: DISCONTINUED | OUTPATIENT
Start: 2025-04-08 | End: 2025-04-08 | Stop reason: HOSPADM

## 2025-04-08 RX ORDER — NITROGLYCERIN 0.4 MG/1
0.4 TABLET SUBLINGUAL
Status: DISCONTINUED | OUTPATIENT
Start: 2025-04-08 | End: 2025-04-09 | Stop reason: HOSPADM

## 2025-04-08 RX ADMIN — CARVEDILOL 12.5 MG: 3.12 TABLET, FILM COATED ORAL at 06:12

## 2025-04-08 RX ADMIN — SODIUM CHLORIDE 2.5 MG/HR: 9 INJECTION, SOLUTION INTRAVENOUS at 07:23

## 2025-04-08 RX ADMIN — LIDOCAINE HYDROCHLORIDE 60 MG: 20 INJECTION, SOLUTION INFILTRATION; PERINEURAL at 07:12

## 2025-04-08 RX ADMIN — SODIUM CHLORIDE: 9 INJECTION, SOLUTION INTRAVENOUS at 06:57

## 2025-04-08 RX ADMIN — ACETAMINOPHEN 650 MG: 325 TABLET, FILM COATED ORAL at 23:40

## 2025-04-08 RX ADMIN — PROTAMINE SULFATE 70 MG: 10 INJECTION, SOLUTION INTRAVENOUS at 08:24

## 2025-04-08 RX ADMIN — HEPARIN SODIUM 10000 UNITS: 1000 INJECTION, SOLUTION INTRAVENOUS; SUBCUTANEOUS at 07:45

## 2025-04-08 RX ADMIN — PROPOFOL 300 MCG/KG/MIN: 10 INJECTION, EMULSION INTRAVENOUS at 07:12

## 2025-04-08 RX ADMIN — FENTANYL CITRATE 50 MCG: 50 INJECTION, SOLUTION INTRAMUSCULAR; INTRAVENOUS at 07:06

## 2025-04-08 RX ADMIN — HEPARIN SODIUM 4000 UNITS: 1000 INJECTION, SOLUTION INTRAVENOUS; SUBCUTANEOUS at 07:55

## 2025-04-08 RX ADMIN — DEXMEDETOMIDINE HYDROCHLORIDE 0.3 MCG/KG/HR: 4 INJECTION, SOLUTION INTRAVENOUS at 07:06

## 2025-04-08 RX ADMIN — HYDRALAZINE HYDROCHLORIDE 10 MG: 20 INJECTION INTRAMUSCULAR; INTRAVENOUS at 09:13

## 2025-04-08 RX ADMIN — FENTANYL CITRATE 25 MCG: 50 INJECTION, SOLUTION INTRAMUSCULAR; INTRAVENOUS at 09:11

## 2025-04-08 RX ADMIN — SODIUM CHLORIDE: 9 INJECTION, SOLUTION INTRAVENOUS at 07:00

## 2025-04-08 RX ADMIN — FENTANYL CITRATE 25 MCG: 50 INJECTION, SOLUTION INTRAMUSCULAR; INTRAVENOUS at 08:57

## 2025-04-08 RX ADMIN — MIDAZOLAM 1 MG: 1 INJECTION INTRAMUSCULAR; INTRAVENOUS at 06:17

## 2025-04-08 RX ADMIN — VANCOMYCIN HYDROCHLORIDE 750 MG: 750 INJECTION, POWDER, LYOPHILIZED, FOR SOLUTION INTRAVENOUS at 07:31

## 2025-04-08 RX ADMIN — ACETAMINOPHEN 650 MG: 325 TABLET, FILM COATED ORAL at 17:52

## 2025-04-08 RX ADMIN — SODIUM CHLORIDE 125 ML/HR: 9 INJECTION, SOLUTION INTRAVENOUS at 10:00

## 2025-04-08 RX ADMIN — CHLORHEXIDINE GLUCONATE 15 ML: 1.2 RINSE ORAL at 06:05

## 2025-04-08 RX ADMIN — PROPOFOL 30 MG: 10 INJECTION, EMULSION INTRAVENOUS at 07:12

## 2025-04-08 NOTE — DISCHARGE SUMMARY
Date of Admission: 4/8/2025  Date of Discharge:  4/9/2025    Discharge Diagnosis:   Severe aortic stenosis now s/p TAVR  CAD  HTN  Chronic HFpEF, NYHA class III  Hypothyroidism  Osteoarthritis    Presenting Problem/History of Present Illness:  Severe aortic stenosis   CAD  HTN  Chronic HFpEF, NYHA class III  Hypothyroidism  Osteoarthritis    Chronic Comorbid Conditions relative to Cardiac Surgery include:  Cardiovascular: Coronary Artery Disease, HFpEF, Hypertension, Aortic Stenosis, and NYHA Class III- Marked limitation in activity  Respiratory: None  Endocrine: Hypothyroidism  Nephrology: None  Hematology: None   Other: None    Hospital Course:  Patient is an 80 y.o. female who was admitted to the hospital on 4/8/25 for elective same day surgery. That morning she was taken to the Operating Room and underwent transcatheter aortic valve replacement (TAVR) with a 29 mm Evolute FX transcatheter heart valve by Dr. Simpson and Dr. Peralta. Patient tolerated the procedure well and was transported to the Recovery Unit in stable condition. On post-operative day #1 a transthoracic echocardiogram was performed showing well functioning tavr valve. Later that day, on 4/9/25, patient was deemed stable for discharge home.      Procedures Performed:  4/8/25 Dr. Simpson & Dr. Peralta  1. Transcatheter aortic valve replacement (TAVR) utilizing a 29 mm Evolute FX transcatheter heart valve  2. Percutaneous left femoral arterial access   3. Percutaneous right femoral arterial access  4. Abdominal aortography and bilateral lower extremity angiography  5. Transvenous pacing using a 5-Gambian balloon-tip pacer via R common femoral vein  6. Supravalvular aortogram  7. Transthoracic echocardiogram  8. Arterial line placement       Consults:   Consults       No orders found for last 30 day(s).            Pertinent Test Results:    Lab Results   Component Value Date    WBC 9.43 04/09/2025    HGB 9.1 (L) 04/09/2025    HCT 27.2 (L) 04/09/2025    MCV  86.3 04/09/2025     04/09/2025      Lab Results   Component Value Date    GLUCOSE 112 (H) 04/03/2025    CALCIUM 9.7 04/03/2025     04/03/2025    K 4.0 04/03/2025    CO2 21.5 (L) 04/03/2025     04/03/2025    BUN 16 04/03/2025    CREATININE 0.64 04/03/2025    EGFRIFAFRI 98 09/22/2021    EGFRIFNONA 81 09/22/2021    BCR 25.0 04/03/2025    ANIONGAP 10.5 04/03/2025     Lab Results   Component Value Date    INR 1.16 (H) 04/03/2025    PROTIME 14.7 (H) 04/03/2025         Condition on Discharge:  stable    Vital Signs  Temp:  [97.3 °F (36.3 °C)-98 °F (36.7 °C)] 97.9 °F (36.6 °C)  Heart Rate:  [] 92  Resp:  [16] 16  BP: (129-190)/(66-98) 175/95  Body mass index is 21.41 kg/m².    Discharge Disposition  Home or Self Care    Discharge Medications     Discharge Medications        Changes to Medications        Instructions Start Date   aspirin 81 MG EC tablet  What changed:   when to take this  additional instructions   81 mg, Oral, Daily      carvedilol 12.5 MG tablet  Commonly known as: COREG  What changed: when to take this   TAKE 1 TABLET BY MOUTH TWICE DAILY      fluticasone 50 MCG/ACT nasal spray  Commonly known as: FLONASE  What changed:   when to take this  reasons to take this   2 sprays, Nasal, Daily             Continue These Medications        Instructions Start Date   levalbuterol 45 MCG/ACT inhaler  Commonly known as: Xopenex HFA   1-2 puffs, Inhalation, Every 4 Hours PRN      levothyroxine 88 MCG tablet  Commonly known as: SYNTHROID, LEVOTHROID   88 mcg, Oral, Daily               Discharge Diet:     Activity at Discharge:   Activity Instructions       Activity as Tolerated      Measure Weight      Daily weight, notify if over 3 lbs in one day            Follow-up Appointments  Future Appointments   Date Time Provider Department Center   4/16/2025 11:00 AM Claudette Chavez APRN [id number]   5/28/2025  2:00 PM [facility] E[facility]/VAS BACK [facility]   5/28/2025  3:00 PM Fabian,  MD Andrew [id number]   12/22/2025  8:30 AM [facility]   12/29/2025 11:15 AM Kacie Loving MD [facility]   2/25/2026 10:45 AM [facility] ECHO/VAS FRONT [facility] E[facility]   2/25/2026 11:20 AM Andrew Peralta MD [id number]     Additional Instructions for the Follow-ups that You Need to Schedule       Ambulatory Referral to Cardiac Rehab   As directed      Discharge Follow-up with Specialty: Dr. Peralta; 1 Month   As directed      Specialty: Dr. Peralta   Follow Up: 1 Month        Discharge Follow-up with Specified Provider: [facility] 1 week   As directed      To: [facility] 1 week                Test Results Pending at Discharge  Pending Results       Procedure [Order ID] Specimen - Date/Time    Basic Metabolic Panel [[id number]] Collected: 04/09/25 1044    Specimen: Blood Updated: 04/09/25 1049    Echocardiogram 2D Complete [[id number]] Resulted: 04/09/25 1029     Updated: 04/09/25 1029     EF(MOD-bp) 55.8 %      EF_3D-VOL 53.0 %      LV GLOBAL STRAIN  -19.5 %      LVIDd 3.7 cm      LVIDs 2.5 cm      IVSd 1.15 cm      LVPWd 0.99 cm      FS 31.7 %      IVS/LVPW 1.16 cm      ESV(cubed) 15.8 ml      LV Sys Vol (BSA corrected) 24.3 cm2      EDV(cubed) 49.8 ml      LV Ortega Vol (BSA corrected) 54.5 cm2      LV mass(C)d 123.5 grams      LVOT area 3.1 cm2      LVOT diam 1.99 cm      EDV(MOD-sp2) 84.0 ml      EDV(MOD-sp4) 83.0 ml      ESV(MOD-sp2) 33.0 ml      ESV(MOD-sp4) 37.0 ml      SV(MOD-sp2) 51.0 ml      SV(MOD-sp4) 46.0 ml      SVi(MOD-SP2) 33.5 ml/m2      SVi(MOD-SP4) 30.2 ml/m2      SVi (LVOT) 42.0 ml/m2      EF(MOD-sp2) 60.7 %      EF(MOD-sp4) 55.4 %      MV E max frederick 105.0 cm/sec      MV A max frederick 89.3 cm/sec      MV dec time 0.24 sec      MV E/A 1.18     MV A dur 0.14 sec      LA ESV Index (BP) 56.4 ml/m2      Med Peak E' Frederick 7.4 cm/sec      Lat Peak E' Frederick 8.1 cm/sec      TR max frederick 245.8 cm/sec      Avg E/e' ratio 13.55      SV(LVOT) 63.9 ml      RV Base 2.9 cm      RV Mid 2.7 cm      RV Length 7.0 cm      TAPSE (>1.6) 1.87 cm      RV S' 9.7 cm/sec      Pulm Sys Frederick 79.7 cm/sec      Pulm Ortega Frederick 58.8 cm/sec      Pulm S/D 1.36     LV V1 max 103.5 cm/sec      LV V1 max PG 4.3 mmHg      LV V1 mean PG 2.26 mmHg      LV V1 VTI 20.6 cm      Ao pk frederick 200.9 cm/sec      Ao max PG 16.1 mmHg      Ao mean PG 8.3 mmHg      Ao V2 VTI 38.9 cm      FCO(I,D) 1.64 cm2      Dimensionless Index 0.53 (DI)      MV max PG 5.9 mmHg      MV mean PG 2.28 mmHg      MV V2 VTI 29.0 cm      MV P1/2t 76.6 msec      MVA(P1/2t) 2.9 cm2      MVA(VTI) 2.20 cm2      MV dec slope 448.8 cm/sec2      TR max PG 24.2 mmHg      RV V1 max PG 1.61 mmHg      RV V1 max 63.4 cm/sec      RV V1 VTI 13.4 cm      PA V2 max 68.5 cm/sec      PA acc time 0.18 sec      AT 0.0850 sec      accel time 85.00 msec      RAP systole 3 mmHg     Narrative:        There is a TAVR valve present.      Impression:                  KYM Mckeon  04/09/25  10:58 EDT

## 2025-04-08 NOTE — OP NOTE
TAVR OPERATIVE REPORT    CO-SURGEON: Andrew Peralta MD, [facility]  CO-SURGEON: Jeff Simpson MD    DIAGNOSIS: Severe symptomatic non-rheumatic degenerative aortic stenosis.     POSTOP DIAGNOSIS: Severe symptomatic aortic stenosis.     PRESENT CO-MORBIDITIES: CHF class 3, CAD, HTN, frail and elderly      PROCEDURE: Elective procedure in hybrid operating room     1. Transcatheter aortic valve replacement (TAVR) utilizing a 29 mm Evolute FX transcatheter heart valve  2. Percutaneous left femoral arterial access   3. Percutaneous right femoral arterial access  4. Abdominal aortography and bilateral lower extremity angiography  5. Transvenous pacing using a 5-[place] balloon-tip pacer via R common femoral vein  6. Supravalvular aortogram  7. Transthoracic echocardiogram  8. Arterial line placement      INDICATIONS FOR OPERATION: The patient is a 80-year-old with New York Heart Association Class 3 symptoms and STS score of 4.4%. The patient was determined to be best suited for TAVR by the multidisciplinary heart team due to age and frailty    FDA TAVR INDICATIONS: The patient was judged to be suitable for TAVR by the multidisciplinary team as reviewed by a cardiac surgeon, an interventional cardiologist, and the rest of the TAVR team.  The patient, family and team were in agreement that the case would convert to an open surgical AVR in the event of unsuccessful TAVR. The patient’s co-morbidities would not preclude the expected benefit from correction of the aortic stenosis by TAVR based on the team discussion. The patient will be enrolled in the STS/ACC TAVR TVT registry.     DESCRIPTION: Dr. Andrew Peralta (interventional cardiologist) and Dr. April Simpson (cardiothoracic surgeon) performed the procedure together in all preoperative and operative aspects. The patient was taken to the hybrid OR. A radial art line was inserted preoperatively. Monitored anesthesia was administered without apparent complication. The  "patient was prepped and draped in the usual sterile fashion.     Using fluoroscopic guidance micropuncture technique and ultrasound guidance a 6 Cym[place] short sheath was placed in the right common femoral artery.  Angiography confirmed access in the common femoral artery.  We then gained access in the left common femoral artery using a micropuncture technique and ultrasound guidance and a safe femoral approach and a 6 Cymro sheath was placed.  Following that we accessed the right common femoral vein and a 6 Cymro sheath was placed.  Two Perclose sutures were deployed in offset manner in the right femoral artery using the \"Preclose\" technique.  An 8 Cymro sheath was inserted.  At this point we gave systemic heparin checked an ACT and achieved a therapeutic ACT.       We inserted a 6 [place]ym[place] long pigtail catheter from the left common femoral artery into the noncoronary aortic cusp without difficulty.  We confirmed our deployment angle with a small aortic root injection.  We actually had some difficulty visualizing with the usual injection amounts we had to use 20 cc injections.  We inserted an 5 Fr AL-1 diagnostic catheter to the aortic arch from the right common femoral artery.  We then crossed the aortic valve using a stiff shaft straight Glidewire and easily advanced the AL-1 catheter into the left ventricle.  At that point we exchanged out the stiff shaft straight Glidewire for a regular J 0.035 table wire.  Over that wire I then brought a 6 Cymro pigtail catheter in the left ventricle and removed that wire.  We then measred the pressure in the LV and it was 120/10.   We brought a Cofida wire through the pigtail catheter into the left ventricle and we exchanged out the pigtail catheter.  We then confirmed with echocardiography graphic guidance that the wire was not involved with the mitral valve apparatus.  Once the evolute valve was loaded in the delivery system we confirmed there were no issues with the " loading by holding the system and viewing it under fluoroscopic examination.  Both operators felt that the delivery system was correct and ready to be used.  Over the Cofida wire we did serial dilatation with a 14 Urdu sheath under fluoroscopic guidance while we observed the distal end of the wire in the left ventricle.  We then advanced the evolute delivery system easily in through the right common femoral artery into the descending aorta.  We then brought the delivery system around the aortic arch under fluoroscopic guidance.  At that point we positioned the proximal end of the delivery system just across the aortic valve.  We then began careful deployment of the evolute valve.  Once the valve had started to flower and grabbed the aortic annulus we then took another small aortogram to confirm position of the transaortic valve.  Once we were satisfied with its position we went ahead and did rapid ventricular pacing at 140 bpm.  And we started to unload the valve carefully but then quickly to get through the rumble strips.  We stopped ventricular pacing and allowed for hemodynamic recovery.  At that point we took another aortogram and the deployment angle and also in a shallow [place] angle. Once both operators were happy with the initial deployment we then moved forward for final deployment while keeping a little bit of forward tension on the valve and centering up the nose cone we then slowly released the evolute valve.  Once the valve was released we kept the wire in the left ventricle but removed the nose cone carefully and pulled it back into the descending aorta where it was captured back into the delivery system.  I pulled the pigtail catheter back from the noncoronary cusp and positioned it down the descending aorta.  Echocardiographic evaluation of the valve was then performed and confirmed no significant leak and no significant gradient across the valve.  We took a ascending thoracic aortic angiogram and it  revealed no aortic insufficiency.  At that point the wire was removed from the left ventricle and actually out of the body and was replaced with a soft J table wire in the descending aorta.    At that point we pulled the delivery system back into the common iliac artery on the access site.  We position the pigtail catheter in the distal aorta.  We then removed the delivery system from the body leaving the wire in position and deployed the Perclose sutures.  We then reversed the heparin with protamine and confirmed that it was normalized with an ACT.  Following that we did an aortogram with runoff under digital subtraction through the access site and confirmed that there was no trauma to the iliacs or common femoral artery.  Once the operators were satisfied with that and hemostasis have been obtained, the pigtail catheter was removed over a wire in the left common femoral artery.  The left common femoral sheath was removed and a Perclose suture was deployed with great success.  The temporary pacemaker was removed from the right common femoral vein .  The patient was awakened in the room and then transferred to PACU in stable condition.           1. Hemodynamics:  Post TAVR aortic gradient: 5.  Post TAVR AI: trace. Post FCO: 1.8.     CONTRAST USED: 170 mL.     FLUROSCOPY TIME:  10.7 min    Air KERMA:  303 mGray    EBL: minimal    SPECIMENS: None    CONCLUSIONS:  Successful deployment of a 29 mm Evolute Pro Plus transcatheter aortic heart valve.

## 2025-04-08 NOTE — PLAN OF CARE
Goal Outcome Evaluation:  Plan of Care Reviewed With: patient        Progress: improving  Outcome Evaluation: VSS. Left femoral site with dressing, D/I, no hematoma and bleeding noted, Right femoral site with dressing D/I, no hematoma or bleeding noted. Tylenol for headache. Plan for home tomorrow after Echo.

## 2025-04-08 NOTE — ANESTHESIA POSTPROCEDURE EVALUATION
Patient: Dee Jorge    Procedure Summary       Date: 04/08/25 Room / Location: [address] CARDIOVASCULAR OPERATING ROOM    Anesthesia Start: 0657 Anesthesia Stop: 0856    Procedures:       TRANSFEMORAL TRANSCATHETER AORTIC VALVE REPLACEMENT WITH POSSIBLE OPEN RESCUE SURGERY (Chest)      Transfemoral Transcatheter Aortic Valve Replacement with Possible Open Rescue Surgery Diagnosis:       Nonrheumatic aortic valve stenosis      Chronic diastolic congestive heart failure      Aortic stenosis, severe      (Nonrheumatic aortic valve stenosis [I35.0])      (Chronic diastolic congestive heart failure [I50.32])      (Aortic stenosis, severe [I35.0])    Surgeons: Ochoa Simpson MD; Andrew Peralta MD Provider: Ke Maldonado MD    Anesthesia Type: MAC, [place] ASA Status: 4            Anesthesia Type: MAC, [place]    Vitals  Vitals Value Taken Time   /59 04/08/25 10:30   Temp 36.4 °C (97.5 °F) 04/08/25 10:15   Pulse 80 04/08/25 10:38   Resp 16 04/08/25 10:30   SpO2 98 % 04/08/25 10:38   Vitals shown include unfiled device data.        Post Anesthesia Care and Evaluation    Level of consciousness: awake and alert  Pain management: adequate    Airway patency: patent  Anesthetic complications: No anesthetic complications  PONV Status: controlled  Cardiovascular status: acceptable  Respiratory status: acceptable  Hydration status: acceptable    Comments: Deemed appropriate for discharge from post anesthetic care

## 2025-04-08 NOTE — H&P
Date of Office Visit: 25  Encounter Provider: Andrew Peralta MD  Place of Service: [facility] CARDIOLOGY  Patient Name: Dee Jorge  :1945  [id number]         Chief Complaint   Patient presents with    TAVR Consult         HPI: Dee Jorge is a 80 y.o. female is a patient who sees Dr. Jasmin Mehta and has for a number of years she has aortic stenosis is seen big is trileaflet and degenerative.  Her last echo shows severe aortic stenosis with a peak of 76 and a mean of 44.  She comes to see us today for a TAVR evaluation.  She is a little bit more fatigued than she used to be she is also has a little bit more dyspnea on exertion than she used to have she in general feels like she is okay for the most part she has not had chest pain, PND orthopnea edema syncope or palpitations.  She has had a little bit of asthma she has also been anemic chronically for unclear reasons it sounds like.  She does not have any bleeding difficulty.  She has a little bit of mild coronary disease on a CT calcium score is intolerant of statins was placed on a PSK 9 inhibitor but had to stop it because it is too expensive for her.     Medical History        Past Medical History:   Diagnosis Date    Abnormal electrocardiogram (ECG) (EKG) 2018    Actinic keratosis      Adnexal mass 2008     RIGHT    Anemia      Anxiety      Aortic regurgitation      Arthritis      Asthma      Benign neoplasm of choroid 2015    Bronchitis      CAD (coronary artery disease)      Cataract 10/05/2016     BILATERAL    Constipation      Cystocele      Fatigue      Foot trauma, left, initial encounter 2017    Fracture of patella      Hearing loss      Hypothyroidism       ACQUIRED    Insomnia      Mastodynia 2015    OA (osteoarthritis)      Osteoporosis      Patella fracture 2010     LEFT    Positive occult stool blood test 10/04/2019    Postmenopausal      Rectocele      Right shoulder strain  08/31/2002     D/T FALL, SEEN AT [facility] ER    Rotator cuff tear, right 12/05/2014     SAW DR. CHARLES BARTLETT    Seborrheic keratosis      Trigger finger, left middle finger 02/2016            Surgical History         Past Surgical History:   Procedure Laterality Date    ANTERIOR AND POSTERIOR VAGINAL REPAIR N/A 09/21/2011     RECTOCELE AND CYSTOCELE REPAIR, PLACEMENT OF PROLIFT GRAFT, DR. LISA PALM AT [facility]    BUNIONECTOMY Left 08/02/2010     LEFT MEDIAL EXTRA-ARTICULAR GANGLION CYST REMOVAL AND FIRST MTP CHILECTOMY WITH SYNOVECTOMY, DR. BHAVANI BABCOCK AT [facility]    CHOLECYSTECTOMY N/A 1992     DR. QUINCY CEE    COLONOSCOPY N/A 09/18/2015     ENTIRE COLON WNL, RESCOPE IN 10 YRS, DR. QUINCY VILLARREAL AT [place]    COLONOSCOPY N/A 07/20/2005     WNL, DR. MAY VAZQUEZ AT [facility]    COLONOSCOPY N/A 1/22/2020     ENTIRE COLON WNL, RESCOPE IN 10 YRS, DR. CHINO MONROE AT [facility]    HERNIA REPAIR Bilateral 1972     DR. AIDEN ARZATE    HYSTERECTOMY N/A 1984     DR. ZEKE BLANDON    KNEE ARTHROSCOPY Right      KNEE CARTILAGE SURGERY Right 08/21/2012     RIGHT CHONDROPLASTY OF PATELLA AND TROCHLEA, CHONDROPLASTY MEDIAL FEMORAL CONDYLE, CHONDROPLASTY LATERAL TIBIAL PLATEAU AND LATERAL FEMORAL CONDYLE, SUBTOTAL LATERAL MENISCECTOMY, OSTEOPLASTY ANTERIOR TIBIAL INTERCONDYLAR NOTCH, DR. BHAVANI BABCOCK AT [facility]    NASAL ENDOSCOPY N/A 03/11/2015     ANTERIOR RHINOSCOPY, CONGESTION OF NASAL MUCOSA, DR. BRENDAN CHAMPION    SALPINGO OOPHORECTOMY Right 07/30/2008     FOR ADNEXAL MASS, DR. LISA PALM AT [facility]    TOTAL HIP ARTHROPLASTY Left 05/06/2013     DR.REID PALM AT [facility]    TOTAL HIP ARTHROPLASTY Right 10/13/2008     DR. KELVIN TARIQ AT [place]    TOTAL KNEE ARTHROPLASTY Left 01/20/2014     DR. GORDO PALM AT [facility]            Social History   Social History            Socioeconomic History    Marital status:    Tobacco Use    Smoking status: Never       Passive exposure: Never    Smokeless tobacco: Never   Vaping Use    Vaping status:  Never Used   Substance and Sexual Activity    Alcohol use: No       Comment: daily caffiene    Drug use: No    Sexual activity: Defer       Birth control/protection: Post-menopausal, Surgical                  Family History   Problem Relation Age of Onset    Hypertension Mother      Thyroid disease Mother      Stroke Mother      Asthma Mother      Asthma Father      Emphysema Father      Alcohol abuse Father      Hypertension Father      COPD Father      Thyroid disease Daughter      Diabetes type I Daughter      Diabetes Daughter      Stroke Maternal Grandfather           ischemic    Kidney disease Sister      Hypertension Sister      Vienna's disease Grandchild      Asthma Grandchild      Breast cancer Neg Hx           Review of Systems   Constitutional: Negative for decreased appetite, fever, malaise/fatigue and weight loss.   HENT:  Negative for nosebleeds.    Eyes:  Negative for double vision.   Cardiovascular:  Negative for chest pain, claudication, cyanosis, dyspnea on exertion, irregular heartbeat, leg swelling, near-syncope, orthopnea, palpitations, paroxysmal nocturnal dyspnea and syncope.   Respiratory:  Negative for cough, hemoptysis and shortness of breath.    Hematologic/Lymphatic: Negative for bleeding problem.   Skin:  Negative for rash.   Musculoskeletal:  Negative for falls and myalgias.   Gastrointestinal:  Negative for hematochezia, jaundice, melena, nausea and vomiting.   Genitourinary:  Negative for hematuria.   Neurological:  Negative for dizziness and seizures.   Psychiatric/Behavioral:  Negative for altered mental status and memory loss.          Allergies         Allergies   Allergen Reactions    Statins Myalgia    Codeine Nausea And Vomiting    Penicillins Hives    Sulfa Antibiotics Nausea And Vomiting and Other (See Comments)       HEADACHES              Current Medications      Current Outpatient Medications:     aspirin 81 MG EC tablet, Take 1 tablet by mouth Daily., Disp: 100 tablet,  "Rfl: 3    carvedilol (COREG) 12.5 MG tablet, TAKE 1 TABLET BY MOUTH TWICE DAILY, Disp: 180 tablet, Rfl: 1    levothyroxine (SYNTHROID, LEVOTHROID) 88 MCG tablet, TAKE 1 TABLET BY MOUTH DAILY, Disp: 90 tablet, Rfl: 1    Evolocumab (Repatha SureClick) solution auto-injector SureClick injection, Inject 1 mL under the skin into the appropriate area as directed Every 14 (Fourteen) Days. (Patient not taking: Reported on 12/20/2024), Disp: 6 mL, Rfl: 3    fluticasone (FLONASE) 50 MCG/ACT nasal spray, Administer 2 sprays into the nostril(s) as directed by provider Daily. (Patient not taking: Reported on 2/25/2025), Disp: 16 g, Rfl: 5    guaiFENesin-codeine (ROMILAR-AC) 100-10 MG/5ML solution/syrup, Take 5 mL by mouth 3 (Three) Times a Day As Needed for Cough or Congestion. (Patient not taking: Reported on 2/25/2025), Disp: 125 mL, Rfl: 0    ipratropium (ATROVENT) 0.06 % nasal spray, Administer 2 sprays into the nostril(s) as directed by provider 3 (Three) Times a Day. (Patient not taking: Reported on 2/25/2025), Disp: 15 mL, Rfl: 12    levalbuterol (Xopenex HFA) 45 MCG/ACT inhaler, Inhale 1-2 puffs Every 4 (Four) Hours As Needed for Wheezing. (Patient not taking: Reported on 12/20/2024), Disp: 1 each, Rfl: 5    meloxicam (Mobic) 7.5 MG tablet, Take 1 tablet by mouth Daily. (Patient not taking: Reported on 2/25/2025), Disp: 30 tablet, Rfl: 6    triamcinolone (KENALOG) 0.1 % ointment, Apply 1 Application topically to the appropriate area as directed 2 (Two) Times a Day. (Patient not taking: Reported on 2/25/2025), Disp: 30 g, Rfl: 0     Current Facility-Administered Medications:     cyanocobalamin injection 1,000 mcg, 1,000 mcg, Intramuscular, Q28 Days, Kacie Loving MD, 1,000 mcg at 01/20/25 1115         Objective:      Vitals       Vitals:     02/25/25 1333   BP: 148/82   BP Location: Left arm   Patient Position: Sitting   Pulse: 94   Weight: 54.4 kg (120 lb)   Height: 157.5 cm (62\")         Body mass index is 21.95 " kg/m².     Constitutional:       Appearance: Well-developed.   Eyes:      General: No scleral icterus.  HENT:      Head: Normocephalic.   Neck:      Thyroid: No thyromegaly.      Vascular: No JVD.      Lymphadenopathy: No cervical adenopathy.   Pulmonary:      Effort: Pulmonary effort is normal.      Breath sounds: Normal breath sounds. No wheezing. No rales.   Cardiovascular:      Normal rate. Regular rhythm.      Murmurs: There is a harsh midsystolic murmur at the URSB, radiating to the neck.      No gallop.    Edema:     Peripheral edema absent.   Abdominal:      Palpations: Abdomen is soft.      Tenderness: There is no abdominal tenderness.   Musculoskeletal: Normal range of motion. Skin:     General: Skin is warm and dry.      Findings: No rash.   Neurological:      Mental Status: Alert and oriented to person, place, and time.               ECG 12 Lead     Date/Time: 2/25/2025 1:40 PM  Performed by: Andrew Peralta MD     Authorized by: Andrew Peralta MD  Comparison: compared with previous ECG   Similar to previous ECG  Rhythm: sinus rhythm  Other findings: left ventricular hypertrophy with strain     Clinical impression: abnormal EKG              Assessment:        Diagnosis Plan   1. Nonrheumatic aortic valve stenosis  Case Request Cath Lab: Left Heart Cath     Lipoprotein A (LPA)     Apolipoprotein B     CT Angio TAVR Chest Abdomen Pelvis       2. Atherosclerosis of native coronary artery of native heart without angina pectoris  Lipoprotein A (LPA)     Apolipoprotein B                Plan:       While she has severe aortic stenosis I think she is symptomatic with that and even if she was not I would recommend that we do something about it at this point.  She is got LVH with strain on her ECG also.  I spent a long time discussing with her and her 2 daughters the natural history of aortic stenosis and then options which I think at her age and her frailty her only real option would be a TAVR.  I talked with  about the risk and benefits of a TAVR including the risk of death stroke pacemaker bleeding blood vessel damage.  They would like to move forward with that I am and to have him get set up to get a TAVR CT angiogram and I will do a heart cath on her on March 13 and then we should be set and ready to go to do her TAVR after that hopefully     No follow-ups on file.      As always, it has been a pleasure to participate in your patient's care.        Sincerely,         Andrew Peralta MD      TAVR PLAN OF CARE    The patient is an 80-year old woman with severe non-rheumatic degenerative aortic stenosis.  She has class III CHF symptoms.  She has an STS score of 4.4%, indicating high risk for cardiac surgery.  Comorbidities include coronary disease, hypertension, age frailty.  The patient was was discussed in the multi-disciplinary TAVR conference.  We plan to insert a 29-mm Evolute Pro Plus valve via a femoral approach.  Temporary pacing will be performed from the L common femoral vein approach.  The patient would be a candidate for bailout surgery in the event of unsuccessful TAVR.

## 2025-04-08 NOTE — ANESTHESIA PROCEDURE NOTES
Arterial Line      Patient reassessed immediately prior to procedure    Patient location during procedure: OR   Line placed for hemodynamic monitoring, ABGs/Labs/ISTAT and MD/Surgeon request.  Performed By   Anesthesiologist: Ke Maldonado MD   Preanesthetic Checklist  Completed: patient identified, IV checked, site marked, risks and benefits discussed, surgical consent, monitors and equipment checked, pre-op evaluation and timeout performed  Arterial Line Prep    Sterile Tech: cap, gloves and sterile barriers  Prep: ChloraPrep  Patient monitoring: EKG, continuous pulse oximetry and blood pressure monitoring  Arterial Line Procedure   Laterality:left  Location:  radial artery  Catheter size: 20 G   Guidance: ultrasound guided  PROCEDURE NOTE/ULTRASOUND INTERPRETATION.  Using ultrasound guidance the potential vascular sites for insertion of the catheter were visualized to determine the patency of the vessel to be used for vascular access.  After selecting the appropriate site for insertion, the needle was visualized under ultrasound being inserted into the radial artery, followed by ultrasound confirmation of wire and catheter placement. There were no abnormalities seen on ultrasound; an image was taken; and the patient tolerated the procedure with no complications.   Number of attempts: 1  Successful placement: yes Images: still images not obtained  Post Assessment   Dressing Type: wrist guard applied, secured with tape and occlusive dressing applied.   Complications no  Circ/Move/Sens Assessment: normal and unchanged.   Patient Tolerance: patient tolerated the procedure well with no apparent complications  Additional Notes  ULTRASOUND INTERPRETATION. Using ultrasound guidance, a catheter was placed within in the appropriate vessel and advanced successfully. There were no abnormalities seen on ultrasound; the patient tolerated the procedure with no complications.

## 2025-04-09 ENCOUNTER — READMISSION MANAGEMENT (OUTPATIENT)
Dept: CALL CENTER | Facility: HOSPITAL | Age: 80
End: 2025-04-09
Payer: MEDICARE

## 2025-04-09 ENCOUNTER — APPOINTMENT (OUTPATIENT)
Dept: CARDIOLOGY | Facility: HOSPITAL | Age: 80
End: 2025-04-09
Payer: MEDICARE

## 2025-04-09 VITALS
SYSTOLIC BLOOD PRESSURE: 175 MMHG | OXYGEN SATURATION: 97 % | DIASTOLIC BLOOD PRESSURE: 95 MMHG | HEART RATE: 92 BPM | WEIGHT: 117.06 LBS | RESPIRATION RATE: 16 BRPM | BODY MASS INDEX: 21.54 KG/M2 | HEIGHT: 62 IN | TEMPERATURE: 97.9 F

## 2025-04-09 LAB
ANION GAP SERPL CALCULATED.3IONS-SCNC: 12.3 MMOL/L (ref 5–15)
AORTIC DIMENSIONLESS INDEX: 0.53 (DI)
AV MEAN PRESS GRAD SYS DOP V1V2: 8.3 MMHG
AV VMAX SYS DOP: 200.9 CM/SEC
BH CV ECHO AV AORTIC VALVE AT ACCEL TIME CALCULATED: 85 MSEC
BH CV ECHO LEFT VENTRICLE GLOBAL LONGITUDINAL STRAIN: -19.5 %
BH CV ECHO MEAS - AO MAX PG: 16.1 MMHG
BH CV ECHO MEAS - AO V2 VTI: 38.9 CM
BH CV ECHO MEAS - AT: 0.09 SEC
BH CV ECHO MEAS - AVA(I,D): 1.64 CM2
BH CV ECHO MEAS - EDV(CUBED): 49.8 ML
BH CV ECHO MEAS - EDV(MOD-SP2): 84 ML
BH CV ECHO MEAS - EDV(MOD-SP4): 83 ML
BH CV ECHO MEAS - EF(MOD-SP2): 60.7 %
BH CV ECHO MEAS - EF(MOD-SP4): 55.4 %
BH CV ECHO MEAS - ESV(CUBED): 15.8 ML
BH CV ECHO MEAS - ESV(MOD-SP2): 33 ML
BH CV ECHO MEAS - ESV(MOD-SP4): 37 ML
BH CV ECHO MEAS - FS: 31.7 %
BH CV ECHO MEAS - IVS/LVPW: 1.16 CM
BH CV ECHO MEAS - IVSD: 1.15 CM
BH CV ECHO MEAS - LAT PEAK E' VEL: 8.1 CM/SEC
BH CV ECHO MEAS - LV DIASTOLIC VOL/BSA (35-75): 54.5 CM2
BH CV ECHO MEAS - LV MASS(C)D: 123.5 GRAMS
BH CV ECHO MEAS - LV MAX PG: 4.3 MMHG
BH CV ECHO MEAS - LV MEAN PG: 2.26 MMHG
BH CV ECHO MEAS - LV SYSTOLIC VOL/BSA (12-30): 24.3 CM2
BH CV ECHO MEAS - LV V1 MAX: 103.5 CM/SEC
BH CV ECHO MEAS - LV V1 VTI: 20.6 CM
BH CV ECHO MEAS - LVIDD: 3.7 CM
BH CV ECHO MEAS - LVIDS: 2.5 CM
BH CV ECHO MEAS - LVOT AREA: 3.1 CM2
BH CV ECHO MEAS - LVOT DIAM: 1.99 CM
BH CV ECHO MEAS - LVPWD: 0.99 CM
BH CV ECHO MEAS - MED PEAK E' VEL: 7.4 CM/SEC
BH CV ECHO MEAS - MV A DUR: 0.14 SEC
BH CV ECHO MEAS - MV A MAX VEL: 89.3 CM/SEC
BH CV ECHO MEAS - MV DEC SLOPE: 448.8 CM/SEC2
BH CV ECHO MEAS - MV DEC TIME: 0.24 SEC
BH CV ECHO MEAS - MV E MAX VEL: 105 CM/SEC
BH CV ECHO MEAS - MV E/A: 1.18
BH CV ECHO MEAS - MV MAX PG: 5.9 MMHG
BH CV ECHO MEAS - MV MEAN PG: 2.28 MMHG
BH CV ECHO MEAS - MV P1/2T: 76.6 MSEC
BH CV ECHO MEAS - MV V2 VTI: 29 CM
BH CV ECHO MEAS - MVA(P1/2T): 2.9 CM2
BH CV ECHO MEAS - MVA(VTI): 2.2 CM2
BH CV ECHO MEAS - PA ACC TIME: 0.18 SEC
BH CV ECHO MEAS - PA V2 MAX: 68.5 CM/SEC
BH CV ECHO MEAS - PULM DIAS VEL: 58.8 CM/SEC
BH CV ECHO MEAS - PULM S/D: 1.36
BH CV ECHO MEAS - PULM SYS VEL: 79.7 CM/SEC
BH CV ECHO MEAS - RAP SYSTOLE: 3 MMHG
BH CV ECHO MEAS - RV MAX PG: 1.61 MMHG
BH CV ECHO MEAS - RV V1 MAX: 63.4 CM/SEC
BH CV ECHO MEAS - RV V1 VTI: 13.4 CM
BH CV ECHO MEAS - SV(LVOT): 63.9 ML
BH CV ECHO MEAS - SV(MOD-SP2): 51 ML
BH CV ECHO MEAS - SV(MOD-SP4): 46 ML
BH CV ECHO MEAS - SVI(LVOT): 42 ML/M2
BH CV ECHO MEAS - SVI(MOD-SP2): 33.5 ML/M2
BH CV ECHO MEAS - SVI(MOD-SP4): 30.2 ML/M2
BH CV ECHO MEAS - TAPSE (>1.6): 1.87 CM
BH CV ECHO MEAS - TR MAX PG: 24.2 MMHG
BH CV ECHO MEAS - TR MAX VEL: 245.8 CM/SEC
BH CV ECHO MEASUREMENTS AVERAGE E/E' RATIO: 13.55
BH CV XLRA - RV BASE: 2.9 CM
BH CV XLRA - RV LENGTH: 7 CM
BH CV XLRA - RV MID: 2.7 CM
BH CV XLRA - TDI S': 9.7 CM/SEC
BUN SERPL-MCNC: 9 MG/DL (ref 8–23)
BUN/CREAT SERPL: 18.8 (ref 7–25)
CALCIUM SPEC-SCNC: 9.1 MG/DL (ref 8.6–10.5)
CHLORIDE SERPL-SCNC: 108 MMOL/L (ref 98–107)
CO2 SERPL-SCNC: 20.7 MMOL/L (ref 22–29)
CREAT SERPL-MCNC: 0.48 MG/DL (ref 0.57–1)
DEPRECATED RDW RBC AUTO: 41.4 FL (ref 37–54)
EGFRCR SERPLBLD CKD-EPI 2021: 95.3 ML/MIN/1.73
ERYTHROCYTE [DISTWIDTH] IN BLOOD BY AUTOMATED COUNT: 13.3 % (ref 12.3–15.4)
GLUCOSE SERPL-MCNC: 113 MG/DL (ref 65–99)
HCT VFR BLD AUTO: 27.2 % (ref 34–46.6)
HGB BLD-MCNC: 9.1 G/DL (ref 12–15.9)
LEFT ATRIUM VOLUME INDEX: 56.4 ML/M2
LV EF 3D SEGMENTATION: 53 %
LV EF BIPLANE MOD: 55.8 %
MCH RBC QN AUTO: 28.9 PG (ref 26.6–33)
MCHC RBC AUTO-ENTMCNC: 33.5 G/DL (ref 31.5–35.7)
MCV RBC AUTO: 86.3 FL (ref 79–97)
PLATELET # BLD AUTO: 213 10*3/MM3 (ref 140–450)
PMV BLD AUTO: 8.6 FL (ref 6–12)
POTASSIUM SERPL-SCNC: 3.2 MMOL/L (ref 3.5–5.2)
QT INTERVAL: 366 MS
QT INTERVAL: 415 MS
QTC INTERVAL: 466 MS
QTC INTERVAL: 480 MS
RBC # BLD AUTO: 3.15 10*6/MM3 (ref 3.77–5.28)
SODIUM SERPL-SCNC: 141 MMOL/L (ref 136–145)
WBC NRBC COR # BLD AUTO: 9.43 10*3/MM3 (ref 3.4–10.8)

## 2025-04-09 PROCEDURE — 93005 ELECTROCARDIOGRAM TRACING: CPT | Performed by: INTERNAL MEDICINE

## 2025-04-09 PROCEDURE — 85027 COMPLETE CBC AUTOMATED: CPT | Performed by: NURSE PRACTITIONER

## 2025-04-09 PROCEDURE — 93306 TTE W/DOPPLER COMPLETE: CPT

## 2025-04-09 PROCEDURE — 93356 MYOCRD STRAIN IMG SPCKL TRCK: CPT

## 2025-04-09 PROCEDURE — 93010 ELECTROCARDIOGRAM REPORT: CPT | Performed by: STUDENT IN AN ORGANIZED HEALTH CARE EDUCATION/TRAINING PROGRAM

## 2025-04-09 PROCEDURE — 99232 SBSQ HOSP IP/OBS MODERATE 35: CPT | Performed by: INTERNAL MEDICINE

## 2025-04-09 PROCEDURE — 93306 TTE W/DOPPLER COMPLETE: CPT | Performed by: INTERNAL MEDICINE

## 2025-04-09 PROCEDURE — 99238 HOSP IP/OBS DSCHRG MGMT 30/<: CPT | Performed by: NURSE PRACTITIONER

## 2025-04-09 PROCEDURE — 93356 MYOCRD STRAIN IMG SPCKL TRCK: CPT | Performed by: INTERNAL MEDICINE

## 2025-04-09 PROCEDURE — 80048 BASIC METABOLIC PNL TOTAL CA: CPT | Performed by: INTERNAL MEDICINE

## 2025-04-09 RX ORDER — POTASSIUM CHLORIDE 1500 MG/1
20 TABLET, EXTENDED RELEASE ORAL ONCE
Status: COMPLETED | OUTPATIENT
Start: 2025-04-09 | End: 2025-04-09

## 2025-04-09 RX ORDER — CARVEDILOL 12.5 MG/1
12.5 TABLET ORAL 2 TIMES DAILY WITH MEALS
Status: DISCONTINUED | OUTPATIENT
Start: 2025-04-09 | End: 2025-04-09 | Stop reason: HOSPADM

## 2025-04-09 RX ORDER — ASPIRIN 81 MG/1
81 TABLET ORAL DAILY
Status: DISCONTINUED | OUTPATIENT
Start: 2025-04-09 | End: 2025-04-09 | Stop reason: HOSPADM

## 2025-04-09 RX ADMIN — CARVEDILOL 12.5 MG: 12.5 TABLET, FILM COATED ORAL at 11:27

## 2025-04-09 RX ADMIN — ASPIRIN 81 MG: 81 TABLET, COATED ORAL at 11:27

## 2025-04-09 RX ADMIN — POTASSIUM CHLORIDE 20 MEQ: 20 TABLET, EXTENDED RELEASE ORAL at 12:16

## 2025-04-09 NOTE — CONSULTS
Met with patient to discuss the benefits of cardiac rehab. Provided phase II information along with the contact information for cardiac rehab here at [facility]. Pt is currently not interested in attending.

## 2025-04-09 NOTE — PLAN OF CARE
Goal Outcome Evaluation:  Plan of Care Reviewed With: patient, family           Outcome Evaluation: Pt s/p TAVR, Blood pressures elevated overnight at 150-180s/90s, did take home supply of Coreg 12.5mg at 0415. Recheck /80 at 0600. Labs/ECG/ECHO pending. No acute distress noted, treatment will be updated as needed.

## 2025-04-09 NOTE — OUTREACH NOTE
Prep Survey      Flowsheet Row Responses   [facility] patient discharged from? [place]   Is LACE score < 7 ? Yes   Eligibility [facility]   Date of Admission 04/08/25   Date of Discharge 04/09/25   Discharge Disposition Home or Self Care   Discharge diagnosis Aortic stenosis, TRANSFEMORAL TRANSCATHETER AORTIC VALVE REPLACEMENT   Does the patient have one of the following disease processes/diagnoses(primary or secondary)? General Surgery   Does the patient have Home health ordered? No   Is there a DME ordered? No   Prep survey completed? Yes            Tasha Godoy Registered Nurse

## 2025-04-09 NOTE — PROGRESS NOTES
"Dee Jorge  1945 80 y.o.  [id number]      Patient Care Team:  Kacie Loving MD as PCP - General  Kacie Loving MD as PCP - Family Medicine  Jasmin Mehta MD as Consulting Physician (Cardiology)    CC: Severe aortic stenosis status post TAVR    Interval History: Doing well      Objective   Vital Signs  Temp:  [97.3 °F (36.3 °C)-98 °F (36.7 °C)] 97.9 °F (36.6 °C)  Heart Rate:  [] 92  Resp:  [16-18] 16  BP: (110-190)/(59-98) 175/95    Intake/Output Summary (Last 24 hours) at 4/9/2025 1029  Last data filed at 4/8/2025 2344  Gross per 24 hour   Intake 995.83 ml   Output --   Net 995.83 ml     Flowsheet Rows      Flowsheet Row First Filed Value   Admission Height 157.5 cm (62\") Documented at 04/08/2025 1131   Admission Weight 53.1 kg (117 lb) Documented at 04/08/2025 1131            Physical Exam:   General Appearance:    Alert,oriented, in no acute distress   Lungs:     Clear to auscultation,BS are equal    Heart:    Normal S1 and S2, RRR without murmur, gallop or rub   HEENT:    Sclerae are clear, no JVD or adenopathy   Abdomen:     Normal bowel sounds, soft nontender, nondistended, no HSM   Extremities:   Moves all extremities well, no edema, no cyanosis, no             Redness, no rash     Medication Review:      aspirin, 81 mg, Oral, Daily  carvedilol, 12.5 mg, Oral, BID With Meals  cyanocobalamin, 1,000 mcg, Intramuscular, Q28 Days  levothyroxine, 88 mcg, Oral, Daily             I reviewed the patient's new clinical results.  I personally viewed and interpreted the patient's EKG/Telemetry data    Assessment/Plan  Active Hospital Problems    Diagnosis  POA    **Aortic stenosis [I35.0]  Yes    Aortic stenosis, severe [I35.0]  Yes     Priority: High    Nonrheumatic aortic valve stenosis [I35.0]  Yes     Priority: High    Chronic diastolic congestive heart failure [I50.32]  Unknown      Resolved Hospital Problems   No resolved problems to display.       Status post TAVR doing very well her ECG " looks good the valve looks great on echo I think she can go home has a little bit of high pressures lets see how she does over the next few days she can come see Claudette in a week and then see me in a month    Andrew Peralta MD  04/09/25  10:29 EDT

## 2025-04-10 ENCOUNTER — TRANSITIONAL CARE MANAGEMENT TELEPHONE ENCOUNTER (OUTPATIENT)
Dept: CALL CENTER | Facility: HOSPITAL | Age: 80
End: 2025-04-10
Payer: MEDICARE

## 2025-04-10 NOTE — OUTREACH NOTE
Call Center TCM Note      Flowsheet Row Responses   [facility], [place] patient discharged from? [place]   Does the patient have one of the following disease processes/diagnoses(primary or secondary)? General Surgery   TCM attempt successful? No   Unsuccessful attempts Attempt 1   Call Status Left message   Comments Needs Post-op, Cardiac Rehab appts,  cardiology 4/16/25            Nayana JOYCE - Registered Nurse    4/10/2025, 13:11 EDT

## 2025-04-10 NOTE — OUTREACH NOTE
Call Center TCM Note      Flowsheet Row Responses   [facility], [place] by [facility] patient discharged from? [place]   Does the patient have one of the following disease processes/diagnoses(primary or secondary)? General Surgery   TCM attempt successful? No   Unsuccessful attempts Attempt 2   Call Status Left message            Nayana Godoy Registered Nurse    4/10/2025, 15:19 EDT

## 2025-04-11 ENCOUNTER — TRANSITIONAL CARE MANAGEMENT TELEPHONE ENCOUNTER (OUTPATIENT)
Dept: CALL CENTER | Facility: HOSPITAL | Age: 80
End: 2025-04-11
Payer: MEDICARE

## 2025-04-11 NOTE — OUTREACH NOTE
Call Center TCM Note      Flowsheet Row Responses   [facility] patient discharged from? [place]   Does the patient have one of the following disease processes/diagnoses(primary or secondary)? General Surgery   TCM attempt successful? Yes   Call start time 0914   Call end time 0919   List who call center can speak with Daughter.   Meds reviewed with patient/caregiver? Yes   Is the patient having any side effects they believe may be caused by any medication additions or changes? No   Does the patient have all medications related to this admission filled (includes all antibiotics, pain medications, etc.) Yes   Is the patient taking all medications as directed (includes completed medication regime)? Yes   Comments Daughter states pt will prefer to call pcp office for hospital f/u appointment.   Does the patient have an appointment with their PCP within 7-14 days of discharge? No   Nursing Interventions Routed TCM call to PCP office   Has home health visited the patient within 72 hours of discharge? N/A   Psychosocial issues? No   Did the patient receive a copy of their discharge instructions? Yes   What is the patient's perception of their health status since discharge? Improving   Is the patient /caregiver able to teach back basic post-op care? Drive as instructed by MD in discharge instructions, Take showers only when approved by MD-sponge bathe until then, No tub bath, swimming, or hot tub until instructed by MD   Is the patient/caregiver able to teach back signs and symptoms of incisional infection? Increased redness, swelling or pain at the incisonal site, Increased drainage or bleeding, Incisional warmth, Pus or odor from incision   Is the patient/caregiver able to teach back steps to recovery at home? Set small, achievable goals for return to baseline health, Rest and rebuild strength, gradually increase activity   Is the patient/caregiver able to teach back the hierarchy of who to call/visit for  symptoms/problems? PCP, Specialist, Home health nurse, Urgent Care, ED, 911 Yes   TCM call completed? Yes   Wrap up additional comments Daughter reports pt is well. Pt is independent at home. Daughter reports incision is healing well.   Call end time 0919   Would this patient benefit from a Referral to [facility] Social Work? No   Is the patient interested in additional calls from an ambulatory ? No            Sarah COYNE - Registered Nurse    4/11/2025, 09:20 EDT

## 2025-04-16 ENCOUNTER — HOSPITAL ENCOUNTER (OUTPATIENT)
Dept: CARDIOLOGY | Facility: HOSPITAL | Age: 80
Discharge: HOME | End: 2025-04-16
Admitting: NURSE PRACTITIONER
Payer: MEDICARE

## 2025-04-16 ENCOUNTER — OFFICE VISIT (OUTPATIENT)
Dept: CARDIOLOGY | Age: 80
End: 2025-04-16
Payer: MEDICARE

## 2025-04-16 VITALS
HEIGHT: 62 IN | DIASTOLIC BLOOD PRESSURE: 80 MMHG | BODY MASS INDEX: 21.53 KG/M2 | SYSTOLIC BLOOD PRESSURE: 146 MMHG | HEART RATE: 79 BPM | WEIGHT: 117 LBS

## 2025-04-16 DIAGNOSIS — R53.83 OTHER FATIGUE: ICD-10-CM

## 2025-04-16 DIAGNOSIS — R06.09 DYSPNEA ON EXERTION: ICD-10-CM

## 2025-04-16 DIAGNOSIS — I35.0 AORTIC STENOSIS, SEVERE: Primary | ICD-10-CM

## 2025-04-16 DIAGNOSIS — I25.10 CORONARY ARTERY DISEASE INVOLVING NATIVE HEART WITHOUT ANGINA PECTORIS, UNSPECIFIED VESSEL OR LESION TYPE: ICD-10-CM

## 2025-04-16 DIAGNOSIS — D64.9 ANEMIA, UNSPECIFIED TYPE: ICD-10-CM

## 2025-04-16 DIAGNOSIS — I10 ESSENTIAL HYPERTENSION: ICD-10-CM

## 2025-04-16 DIAGNOSIS — Z95.2 S/P TAVR (TRANSCATHETER AORTIC VALVE REPLACEMENT): ICD-10-CM

## 2025-04-16 LAB
ANION GAP SERPL CALCULATED.3IONS-SCNC: 11.5 MMOL/L (ref 5–15)
BUN SERPL-MCNC: 17 MG/DL (ref 8–23)
BUN/CREAT SERPL: 28.3 (ref 7–25)
CALCIUM SPEC-SCNC: 9.8 MG/DL (ref 8.6–10.5)
CHLORIDE SERPL-SCNC: 105 MMOL/L (ref 98–107)
CO2 SERPL-SCNC: 22.5 MMOL/L (ref 22–29)
CREAT SERPL-MCNC: 0.6 MG/DL (ref 0.57–1)
DEPRECATED RDW RBC AUTO: 43.3 FL (ref 37–54)
EGFRCR SERPLBLD CKD-EPI 2021: 90.3 ML/MIN/1.73
ERYTHROCYTE [DISTWIDTH] IN BLOOD BY AUTOMATED COUNT: 13.2 % (ref 12.3–15.4)
FERRITIN SERPL-MCNC: 31.9 NG/ML (ref 13–150)
GLUCOSE SERPL-MCNC: 102 MG/DL (ref 65–99)
HCT VFR BLD AUTO: 33.3 % (ref 34–46.6)
HGB BLD-MCNC: 10.5 G/DL (ref 12–15.9)
IRON 24H UR-MRATE: 50 MCG/DL (ref 37–145)
IRON SATN MFR SERPL: 9 % (ref 20–50)
MCH RBC QN AUTO: 28.2 PG (ref 26.6–33)
MCHC RBC AUTO-ENTMCNC: 31.5 G/DL (ref 31.5–35.7)
MCV RBC AUTO: 89.5 FL (ref 79–97)
NT-PROBNP SERPL-MCNC: 327 PG/ML (ref 0–1800)
PLATELET # BLD AUTO: 458 10*3/MM3 (ref 140–450)
PMV BLD AUTO: 8.9 FL (ref 6–12)
POTASSIUM SERPL-SCNC: 4.3 MMOL/L (ref 3.5–5.2)
RBC # BLD AUTO: 3.72 10*6/MM3 (ref 3.77–5.28)
SODIUM SERPL-SCNC: 139 MMOL/L (ref 136–145)
TIBC SERPL-MCNC: 533 MCG/DL (ref 298–536)
TRANSFERRIN SERPL-MCNC: 358 MG/DL (ref 200–360)
WBC NRBC COR # BLD AUTO: 8.82 10*3/MM3 (ref 3.4–10.8)

## 2025-04-16 PROCEDURE — 83540 ASSAY OF IRON: CPT | Performed by: NURSE PRACTITIONER

## 2025-04-16 PROCEDURE — 84466 ASSAY OF TRANSFERRIN: CPT | Performed by: NURSE PRACTITIONER

## 2025-04-16 PROCEDURE — 82728 ASSAY OF FERRITIN: CPT | Performed by: NURSE PRACTITIONER

## 2025-04-16 PROCEDURE — 80048 BASIC METABOLIC PNL TOTAL CA: CPT | Performed by: NURSE PRACTITIONER

## 2025-04-16 PROCEDURE — 85027 COMPLETE CBC AUTOMATED: CPT | Performed by: NURSE PRACTITIONER

## 2025-04-16 PROCEDURE — 83880 ASSAY OF NATRIURETIC PEPTIDE: CPT | Performed by: NURSE PRACTITIONER

## 2025-04-16 PROCEDURE — 36415 COLL VENOUS BLD VENIPUNCTURE: CPT

## 2025-04-16 NOTE — PROGRESS NOTES
Date of Office Visit: 2025  Encounter Provider: KYM Ledesma  Place of Service: [facility]  Patient Name: Dee Jorge  :1945    Chief Complaint   Patient presents with    Cardiac Valve Problem   :     HPI: Dee Jorge is a 80 y.o. female patient of Dr. Whitaker who we have been following for aortic valve stenosis.  An echocardiogram in February demonstrated worsening aortic stenosis.  She was referred to the structural heart team for a TAVR evaluation.    Subsequently, she underwent a cardiac catheterization demonstrating mild mid LAD disease and severe aortic stenosis.    Ultimately, on , she underwent a successful TAVR utilizing a 29 mm evolute pro plus.  Echocardiogram the following day demonstrated normal LV function and a well-functioning TAVR valve.  She had moderate MR. She is here today for follow-up.    Overall she is feeling well.  She still little short of breath but says that it is better.  She is also been a little dizzy and anxious.  She does not sleep well.  She denies any chest pain, palpitations, edema, or syncope.  She does monitor her blood pressures at home and says they are mostly in the 120s systolic.    Past Medical History:   Diagnosis Date    Abnormal electrocardiogram (ECG) (EKG) 2018    Actinic keratosis     Adnexal mass 2008    RIGHT    Anemia     Anxiety     Aortic regurgitation     Aortic stenosis     Arthritis     Asthma     Benign neoplasm of choroid 2015    Bronchitis     CAD (coronary artery disease)     Cataract 10/05/2016    BILATERAL    Constipation     Cystocele     Fatigue     Foot trauma, left, initial encounter 2017    Fracture of patella     Hearing loss     Hyperlipidemia     Hypertension     Hypothyroidism     ACQUIRED    Insomnia     Mastodynia 2015    OA (osteoarthritis)     Osteoporosis     Patella fracture 2010    LEFT    Positive occult stool blood test 10/04/2019     Postmenopausal     Rectocele     Right shoulder strain 08/31/2002    D/T FALL, SEEN AT [facility] ER    Rotator cuff tear, right 12/05/2014    SAW DR. CHARLES BARTLETT    Seborrheic keratosis     Trigger finger, left middle finger 02/2016       Past Surgical History:   Procedure Laterality Date    ANTERIOR AND POSTERIOR VAGINAL REPAIR N/A 09/21/2011    RECTOCELE AND CYSTOCELE REPAIR, PLACEMENT OF PROLIFT GRAFT, DR. LISA PALM AT [facility]    AORTIC VALVE REPAIR/REPLACEMENT N/A 4/8/2025    Procedure: TRANSFEMORAL TRANSCATHETER AORTIC VALVE REPLACEMENT WITH POSSIBLE OPEN RESCUE SURGERY;  Surgeon: Ochoa Simpson MD;  Location: [facility];  Service: Cardiothoracic;  Laterality: N/A;    AORTIC VALVE REPAIR/REPLACEMENT N/A 4/8/2025    Procedure: Transfemoral Transcatheter Aortic Valve Replacement with Possible Open Rescue Surgery;  Surgeon: Andrew Peralta MD;  Location:  [facility];  Service: Cardiovascular;  Laterality: N/A;    BUNIONECTOMY Left 08/02/2010    LEFT MEDIAL EXTRA-ARTICULAR GANGLION CYST REMOVAL AND FIRST MTP CHILECTOMY WITH SYNOVECTOMY, DR. BHAVANI BABCOCK AT [facility]    CARDIAC CATHETERIZATION N/A 3/13/2025    Procedure: Left Heart Cath;  Surgeon: Andrew Peralta MD;  Location:  [facility];  Service: Cardiology;  Laterality: N/A;    CARDIAC CATHETERIZATION N/A 3/13/2025    Procedure: Coronary angiography;  Surgeon: Andrew Peralta MD;  Location:  [facility];  Service: Cardiology;  Laterality: N/A;    CHOLECYSTECTOMY N/A 1992    DR. QUINCY CEE    COLONOSCOPY N/A 09/18/2015    ENTIRE COLON WNL, RESCOPE IN 10 YRS, DR. QUINCY VILLARREAL AT [place]    COLONOSCOPY N/A 07/20/2005    WNL, DR. ANDREW VAZQUEZ AT [facility]    COLONOSCOPY N/A 1/22/2020    ENTIRE COLON WNL, RESCOPE IN 10 YRS, DR. CHINO MONROE AT [facility]    HERNIA REPAIR Bilateral 1972    DR. AIDEN ARZATE    HYSTERECTOMY N/A 1984    DR. ZEKE BLANDON    KNEE ARTHROSCOPY Right     KNEE CARTILAGE SURGERY Right 08/21/2012    RIGHT  CHONDROPLASTY OF PATELLA AND TROCHLEA, CHONDROPLASTY MEDIAL FEMORAL CONDYLE, CHONDROPLASTY LATERAL TIBIAL PLATEAU AND LATERAL FEMORAL CONDYLE, SUBTOTAL LATERAL MENISCECTOMY, OSTEOPLASTY ANTERIOR TIBIAL INTERCONDYLAR NOTCH, DR. BHAVANI BABCOCK AT [facility]    NASAL ENDOSCOPY N/A 03/11/2015    ANTERIOR RHINOSCOPY, CONGESTION OF NASAL MUCOSA, DR. BRENDAN CHAMPION    SALPINGO OOPHORECTOMY Right 07/30/2008    FOR ADNEXAL MASS, DR. LISA PALM AT [facility]    TOTAL HIP ARTHROPLASTY Left 05/06/2013    DR.REID PALM AT [facility]    TOTAL HIP ARTHROPLASTY Right 10/13/2008    DR. KELVIN TARIQ AT [place]    TOTAL KNEE ARTHROPLASTY Left 01/20/2014    DR. GORDO PALM AT [facility]       Social History     Socioeconomic History    Marital status:    Tobacco Use    Smoking status: Never     Passive exposure: Never    Smokeless tobacco: Never   Vaping Use    Vaping status: Never Used   Substance and Sexual Activity    Alcohol use: No     Comment: daily caffiene    Drug use: No    Sexual activity: Defer     Birth control/protection: Post-menopausal, Surgical       Family History   Problem Relation Age of Onset    Hypertension Mother     Thyroid disease Mother     Stroke Mother     Asthma Mother     Asthma Father     Emphysema Father     Alcohol abuse Father     Hypertension Father     COPD Father     Kidney disease Sister     Hypertension Sister     Thyroid disease Daughter     Diabetes type I Daughter     Diabetes Daughter     Stroke Maternal Grandfather         ischemic    Menifee's disease Grandchild     Asthma Grandchild     Breast cancer Neg Hx     Malig Hyperthermia Neg Hx        Review of Systems   Constitutional: Positive for malaise/fatigue.   Cardiovascular:  Positive for dyspnea on exertion. Negative for chest pain, leg swelling, orthopnea, paroxysmal nocturnal dyspnea and syncope.   Respiratory: Negative.     Hematologic/Lymphatic: Negative for bleeding problem.   Musculoskeletal:  Negative for falls.   Gastrointestinal:  Negative for  "melena.   Neurological:  Positive for dizziness. Negative for light-headedness.       Allergies   Allergen Reactions    Statins Myalgia    Codeine Nausea And Vomiting    Penicillins Hives    Sulfa Antibiotics Nausea And Vomiting and Other (See Comments)     HEADACHES         Current Outpatient Medications:     aspirin 81 MG EC tablet, Take 1 tablet by mouth Daily., Disp: 100 tablet, Rfl: 3    carvedilol (COREG) 12.5 MG tablet, TAKE 1 TABLET BY MOUTH TWICE DAILY (Patient taking differently: Take 1 tablet by mouth 2 (Two) Times a Day With Meals.), Disp: 180 tablet, Rfl: 1    fluticasone (FLONASE) 50 MCG/ACT nasal spray, Administer 2 sprays into the nostril(s) as directed by provider Daily., Disp: 16 g, Rfl: 5    levalbuterol (Xopenex HFA) 45 MCG/ACT inhaler, Inhale 1-2 puffs Every 4 (Four) Hours As Needed for Wheezing., Disp: 1 each, Rfl: 5    levothyroxine (SYNTHROID, LEVOTHROID) 88 MCG tablet, TAKE 1 TABLET BY MOUTH DAILY, Disp: 90 tablet, Rfl: 1    Current Facility-Administered Medications:     cyanocobalamin injection 1,000 mcg, 1,000 mcg, Intramuscular, Q28 Days, Kacie Loving MD, 1,000 mcg at 03/24/25 0850      Objective:     Vitals:    04/16/25 1036   BP: 146/80   Pulse: 79   Weight: 53.1 kg (117 lb)   Height: 157.5 cm (62\")     Body mass index is 21.4 kg/m².    PHYSICAL EXAM:    Neck:      Vascular: No JVD.   Pulmonary:      Effort: Pulmonary effort is normal.      Breath sounds: Normal breath sounds.   Cardiovascular:      Normal rate. Regular rhythm.      Murmurs: There is no murmur.      No gallop.  No click. No rub.   Pulses:     Intact distal pulses.   Skin:     Comments: Groin sites C, D, I           ECG 12 Lead    Date/Time: 4/16/2025 10:51 AM  Performed by: Claudette Chavez APRN    Authorized by: Claudette Chavez APRN  Comparison: compared with previous ECG from 4/9/2025  Similar to previous ECG  Rhythm: sinus rhythm  Rate: normal  BPM: 79  Other findings: left ventricular hypertrophy with " strain            Assessment:       Diagnosis Plan   1. Aortic stenosis, severe  ECG 12 Lead      2. S/P TAVR (transcatheter aortic valve replacement)        3. Coronary artery disease involving native heart without angina pectoris, unspecified vessel or lesion type        4. Essential hypertension        5. Anemia, unspecified type  Iron Profile    Ferritin      6. Other fatigue  Iron Profile      7. Dyspnea on exertion  proBNP    CBC (No Diff)    Basic Metabolic Panel        Orders Placed This Encounter   Procedures    proBNP     Standing Status:   Future     Number of Occurrences:   1     Expected Date:   4/21/2025     Expiration Date:   4/16/2026     Release to patient:   Routine Release [[id number]]    CBC (No Diff)     Standing Status:   Future     Number of Occurrences:   1     Expected Date:   4/21/2025     Expiration Date:   4/16/2026     Release to patient:   Routine Release [[id number]]    Basic Metabolic Panel     Standing Status:   Future     Number of Occurrences:   1     Expected Date:   4/23/2025     Expiration Date:   4/16/2026     Release to patient:   Routine Release [[id number]]    Iron Profile     Standing Status:   Future     Number of Occurrences:   1     Expected Date:   4/21/2025     Expiration Date:   7/16/2026     Release to patient:   Routine Release [[id number]]    Ferritin     Standing Status:   Future     Expected Date:   4/21/2025     Expiration Date:   7/16/2026     Release to patient:   Routine Release [[id number]]    ECG 12 Lead     This order was created via procedure documentation     Release to patient:   Routine Release [[id number]]          Plan:       1.  Aortic stenosis.  Status post TAVR.  Follow-up echocardiogram demonstrated normal LV function and well-functioning TAVR valve.      2.  Coronary artery disease.  Preoperative catheterization demonstrated mild mid LAD disease for which medical therapy was recommended.  Continue aspirin.  She is statin intolerant.  Previously  was on Repatha which became cost prohibitive.  We discussed other options including Leqvio and Zetia.  She would like to give this some thought.      3.  Anemia.  She has a longstanding history of anemia.  Previously noted to be mildly iron deficient.  I do think checking an iron profile is reasonable.  Ultimately her primary care doctor will need to follow this.      4.  Dyspnea on exertion.  Overall this is improving.  As above, I am checking an iron profile as well as a CBC.  Also recommended a proBNP and BMP.  Her potassium was a little low in the hospital.      Overall I think she is doing well.  I will call her with her lab results.  Otherwise, she will follow-up with Dr. Peralta on 5/28.      As always, it has been a pleasure to participate in your patient's care.      Sincerely,         KYM Sanchez

## 2025-04-17 ENCOUNTER — TELEPHONE (OUTPATIENT)
Dept: INTERNAL MEDICINE | Facility: CLINIC | Age: 80
End: 2025-04-17
Payer: MEDICARE

## 2025-04-17 NOTE — TELEPHONE ENCOUNTER
Ok for HUB to read and schedule:    Left voice mail for patient to call office and schedule an appointment with Dr. Loving

## 2025-04-17 NOTE — TELEPHONE ENCOUNTER
----- Message from Kcaie Loving sent at 4/17/2025  9:18 AM EDT -----  Please schedule patient for a follow up visitJW  ----- Message -----  From: Claudette Chavez APRN  Sent: 4/16/2025   2:03 PM EDT  To: MD Dr. Inder Ennis,I wanted to touch base regarding this mutual patient. I saw her today for TAVR follow up. From our standpoint things are going well.  It appears she has a long standing history of anemia.  She had a lot of questions about it today and asked if I would check more labs so I ordered an iron profile and ferritin.  It actually looks pretty normal with the exception of low iron saturation. I told her that she ultimately needed to follow up with you, so I wanted to bring it to your attention. Thank you!KYM Sanchez

## 2025-04-19 NOTE — OP NOTE
TAVR OPERATIVE REPORT     CO-SURGEON: Ochoa Simpson MD  CO-SURGEON: Andrew Peralta MD     DIAGNOSIS: Severe symptomatic aortic stenosis.      POSTOP DIAGNOSIS: Severe symptomatic aortic stenosis.      PRESENT CO-MORBIDITIES: Severe symptomatic aortic stenosis, coronary artery disease, CHF class III, hypertension, hyperlipidemia, asthma and hypothyroidism     PROCEDURE: Elective procedure in hybrid operating room      1. Transcatheter aortic valve replacement (TAVR) utilizing a 29 mm Medtronic Evolut Pro valve  2. Percutaneous right femoral arterial access   3. Percutaneous left femoral arterial and venous access  4. Abdominal aortography and bilateral lower extremity angiography  5. Transvenous pacing using a 5-Marshallese balloon-tip pacer  6. Supravalvular aortogram  7. Transthoracic echocardiogram  8. Arterial line placement          INDICATIONS FOR OPERATION: The patient is a 80-year-old with New York Heart Association Class 3 symptoms and STS score of 4.4%. The patient was determined to be best suited for TAVR by the multidisciplinary heart team due to elevated (moderate) surgical risk multiple comorbidities.     FDA TAVR INDICATIONS: The patient was judged to be suitable for TAVR by the multidisciplinary team as reviewed by two cardiac surgeons, an interventional cardiologist, and the rest of the TAVR team.  The patient, family and team were in agreement that the case would convert to an open surgical AVR in the event of unsuccessful TAVR. The patient’s co-morbidities would not preclude the expected benefit from correction of the aortic stenosis by TAVR based on the team discussion. The patient will be enrolled in the STS/ACC TAVR TVT registry.      DESCRIPTION: Dr. Peralta (interventional cardiologist) and Dr. Simpson (cardiothoracic surgeon) performed the procedure together in all preoperative and operative aspects. The patient was taken to the hybrid OR. A radial art line and central venous access were inserted  "preoperatively. Anesthesia was administered without apparent complication. The patient was prepped and draped in the usual sterile fashion.       Using a micropuncture technique, access was obtained in the right femoral artery and a microsheath was inserted.  Angiography through the microsheath confirmed good position of the arterial access point.  Two Perclose sutures were deployed in offset manner in the right femoral artery using the \"Preclose\" technique.  An 8 Bruneian sheath was inserted.       A similar technique was used to obtain access in the left common femoral artery.    A 6 Bruneian sheath was inserted.  Access was obtained in the left femoral vein and a 6 Bruneian sheath was inserted.   A 5 Bruneian pacing catheter was directed to the RV apex and was tested.  A 5 Bruneian pigtail was directed to the non-coronary cusp.  Angiography was performed using 20 cc of contrast.      Heparin was used for anticoagulation.  From the right femoral approach, a 6 Bruneian AL-1 catheter was directed to the ascending aorta.  We crossed the aortic valve with a straight wire and exchanged for a pigtail catheter.  We advanced the pigtail to the LV apex and exchanged for a Confida wire.  TTE demonstrated good position for the wire, without entanglement in the mitral valve apparatus.     A 29-mm Medtronic Evolut Pro valve was prepped and was inspected using fluoroscopy, then was loaded into the delivery system.  Sequential dilation of the right groin was performed, then the valve delivery system was easily inserted to the descending aorta.     The valve was advanced easily across the aortic arch and was placed near the non-coronary cusp.  The valve was carefully deployed until annular contact was made.  Ventricular pacing was performed to stabilize the position.  Position was confirmed using angiography.  The valve was then deployed until the valve leaftets began to function.     Proper valve placement, orientation and degree of " regurgitation was then evaluated by transthoracic echocardiogram and aortography. The co-operators agreed that no further intervention was necessary.     We then withdrew the delivery system to the descending aorta and withdrew the nosecone.  Over the wire, it was removed and hemostasis was obtained with the perclose sutures.  Angiography from the distal aorta.  All parties were in agreement that there was no flow-limiting vascular trauma or extravasation of dye, at which point closure of the arteriotomy was completed with our two Perclose devices.       Finally, our contralateral access was removed and closed using a 6 Ukrainian Angioseal.  The femoral venous sheath was removed.  The patient left in the care of the anesthesia team for extubation and hemodynamic monitoring. The patient was transported to the Open Heart Recovery Unit for further monitoring and care.         1. Hemodynamics:  Post TAVR aortic gradient: 5 mmHg.  Post TAVR AI: trace . Post FCO: 1.8 cm2.      CONTRAST USED: 170 mL.      FLUROSCOPY TIME:  10.7 min     Air KERMA:  303 mGray     EBL: minimal     CONCLUSIONS:  Successful deployment of a 29 mm Medtronic Evolut Pro transcatheter aortic heart valve.

## 2025-05-05 RX ORDER — CARVEDILOL 12.5 MG/1
12.5 TABLET ORAL EVERY 12 HOURS SCHEDULED
Qty: 180 TABLET | Refills: 1 | Status: SHIPPED | OUTPATIENT
Start: 2025-05-05

## 2025-05-08 ENCOUNTER — CLINICAL SUPPORT (OUTPATIENT)
Dept: INTERNAL MEDICINE | Facility: CLINIC | Age: 80
End: 2025-05-08
Payer: MEDICARE

## 2025-05-08 DIAGNOSIS — E53.8 B12 DEFICIENCY: Primary | ICD-10-CM

## 2025-05-09 ENCOUNTER — TELEPHONE (OUTPATIENT)
Dept: INTERNAL MEDICINE | Facility: CLINIC | Age: 80
End: 2025-05-09
Payer: MEDICARE

## 2025-05-09 NOTE — TELEPHONE ENCOUNTER
----- Message from Kacie Loving sent at 5/9/2025  1:04 PM EDT -----  Please schedule for an office visit on Monday or Tuesday to discuss. JW  ----- Message -----  From: Lisa Herrera MA  Sent: 5/8/2025  11:29 AM EDT  To: Kacie Loving MD    Pt would like to see if you could call her in something for anxiety.Nothing controlled has tries lexapro in the past and did not do well on that.

## 2025-05-15 ENCOUNTER — TELEPHONE (OUTPATIENT)
Dept: INTERNAL MEDICINE | Facility: CLINIC | Age: 80
End: 2025-05-15
Payer: MEDICARE

## 2025-05-15 RX ORDER — BUSPIRONE HYDROCHLORIDE 5 MG/1
5 TABLET ORAL 2 TIMES DAILY
Qty: 60 TABLET | Refills: 3 | Status: SHIPPED | OUTPATIENT
Start: 2025-05-15

## 2025-05-15 NOTE — TELEPHONE ENCOUNTER
----- Message from Kacie Loving sent at 5/15/2025  4:10 PM EDT -----  A RX FOR buspirone is sent to her pharmacy. One tablet 2 times daily. Will f/u at her scheduled visit and would like for her to have genesight testing at that time. jw  ----- Message -----  From: Lisa Herrera MA  Sent: 5/14/2025   1:03 PM EDT  To: Kacie Loving MD    Pt is asking for something for her anxiety, doesn't want lexapro. Has tried that before.She wanted to talk to you at last visit but didn't have time, she states.Can this be called in.

## 2025-05-27 ENCOUNTER — TELEPHONE (OUTPATIENT)
Dept: CARDIOLOGY | Age: 80
End: 2025-05-27
Payer: MEDICARE

## 2025-05-28 ENCOUNTER — OFFICE VISIT (OUTPATIENT)
Age: 80
End: 2025-05-28
Payer: MEDICARE

## 2025-05-28 ENCOUNTER — HOSPITAL ENCOUNTER (OUTPATIENT)
Dept: CARDIOLOGY | Facility: HOSPITAL | Age: 80
Discharge: HOME | End: 2025-05-28
Admitting: INTERNAL MEDICINE
Payer: MEDICARE

## 2025-05-28 VITALS
WEIGHT: 117 LBS | SYSTOLIC BLOOD PRESSURE: 182 MMHG | DIASTOLIC BLOOD PRESSURE: 90 MMHG | HEIGHT: 62 IN | BODY MASS INDEX: 21.53 KG/M2

## 2025-05-28 VITALS
BODY MASS INDEX: 21.53 KG/M2 | HEIGHT: 62 IN | DIASTOLIC BLOOD PRESSURE: 88 MMHG | HEART RATE: 66 BPM | WEIGHT: 117 LBS | SYSTOLIC BLOOD PRESSURE: 162 MMHG

## 2025-05-28 DIAGNOSIS — Z95.2 S/P TAVR (TRANSCATHETER AORTIC VALVE REPLACEMENT): ICD-10-CM

## 2025-05-28 DIAGNOSIS — I35.0 AORTIC STENOSIS, SEVERE: Primary | ICD-10-CM

## 2025-05-28 DIAGNOSIS — I50.32 CHRONIC DIASTOLIC CONGESTIVE HEART FAILURE: ICD-10-CM

## 2025-05-28 DIAGNOSIS — I10 ESSENTIAL HYPERTENSION: ICD-10-CM

## 2025-05-28 DIAGNOSIS — I35.0 AORTIC STENOSIS, SEVERE: ICD-10-CM

## 2025-05-28 DIAGNOSIS — I35.0 NONRHEUMATIC AORTIC VALVE STENOSIS: ICD-10-CM

## 2025-05-28 LAB
AORTIC ARCH: 2.3 CM
AORTIC DIMENSIONLESS INDEX: 0.58 (DI)
AV MEAN PRESS GRAD SYS DOP V1V2: 10 MMHG
AV VMAX SYS DOP: 217.5 CM/SEC
BH CV ECHO AV AORTIC VALVE AT ACCEL TIME CALCULATED: 200 MSEC
BH CV ECHO LEFT VENTRICLE GLOBAL LONGITUDINAL STRAIN: -18.5 %
BH CV ECHO MEAS - AI P1/2T: 601.8 MSEC
BH CV ECHO MEAS - AO MAX PG: 18.9 MMHG
BH CV ECHO MEAS - AO V2 VTI: 42.5 CM
BH CV ECHO MEAS - AT: 0.2 SEC
BH CV ECHO MEAS - EDV(CUBED): 63.8 ML
BH CV ECHO MEAS - EDV(MOD-SP2): 146 ML
BH CV ECHO MEAS - EDV(MOD-SP4): 151 ML
BH CV ECHO MEAS - EF(MOD-SP2): 55.5 %
BH CV ECHO MEAS - EF(MOD-SP4): 54.3 %
BH CV ECHO MEAS - ESV(CUBED): 15.2 ML
BH CV ECHO MEAS - ESV(MOD-SP2): 65 ML
BH CV ECHO MEAS - ESV(MOD-SP4): 69 ML
BH CV ECHO MEAS - FS: 37.9 %
BH CV ECHO MEAS - IVS/LVPW: 1.04 CM
BH CV ECHO MEAS - IVSD: 0.91 CM
BH CV ECHO MEAS - LAT PEAK E' VEL: 7.5 CM/SEC
BH CV ECHO MEAS - LV DIASTOLIC VOL/BSA (35-75): 99.2 CM2
BH CV ECHO MEAS - LV MASS(C)D: 107.7 GRAMS
BH CV ECHO MEAS - LV MAX PG: 4.6 MMHG
BH CV ECHO MEAS - LV MEAN PG: 3 MMHG
BH CV ECHO MEAS - LV SYSTOLIC VOL/BSA (12-30): 45.3 CM2
BH CV ECHO MEAS - LV V1 MAX: 107 CM/SEC
BH CV ECHO MEAS - LV V1 VTI: 24.8 CM
BH CV ECHO MEAS - LVIDD: 4 CM
BH CV ECHO MEAS - LVIDS: 2.48 CM
BH CV ECHO MEAS - LVPWD: 0.87 CM
BH CV ECHO MEAS - MED PEAK E' VEL: 4.6 CM/SEC
BH CV ECHO MEAS - MR MAX PG: 113.2 MMHG
BH CV ECHO MEAS - MR MAX VEL: 532.1 CM/SEC
BH CV ECHO MEAS - MV A DUR: 0.14 SEC
BH CV ECHO MEAS - MV A MAX VEL: 93 CM/SEC
BH CV ECHO MEAS - MV DEC SLOPE: 340 CM/SEC2
BH CV ECHO MEAS - MV DEC TIME: 0.18 SEC
BH CV ECHO MEAS - MV E MAX VEL: 80.1 CM/SEC
BH CV ECHO MEAS - MV E/A: 0.86
BH CV ECHO MEAS - MV MAX PG: 4.2 MMHG
BH CV ECHO MEAS - MV MEAN PG: 1.59 MMHG
BH CV ECHO MEAS - MV P1/2T: 66.5 MSEC
BH CV ECHO MEAS - MV V2 VTI: 21.8 CM
BH CV ECHO MEAS - MVA(P1/2T): 3.3 CM2
BH CV ECHO MEAS - PA ACC TIME: 0.13 SEC
BH CV ECHO MEAS - PA V2 MAX: 90.9 CM/SEC
BH CV ECHO MEAS - RAP SYSTOLE: 3 MMHG
BH CV ECHO MEAS - RV MAX PG: 1.34 MMHG
BH CV ECHO MEAS - RV V1 MAX: 57.8 CM/SEC
BH CV ECHO MEAS - RV V1 VTI: 12.1 CM
BH CV ECHO MEAS - SUP REN AO DIAM: 1.4 CM
BH CV ECHO MEAS - SV(MOD-SP2): 81 ML
BH CV ECHO MEAS - SV(MOD-SP4): 82 ML
BH CV ECHO MEAS - SVI(MOD-SP2): 53.2 ML/M2
BH CV ECHO MEAS - SVI(MOD-SP4): 53.9 ML/M2
BH CV ECHO MEAS - TAPSE (>1.6): 2.11 CM
BH CV ECHO MEASUREMENTS AVERAGE E/E' RATIO: 13.24
BH CV XLRA - RV BASE: 2.8 CM
BH CV XLRA - RV LENGTH: 7.6 CM
BH CV XLRA - RV MID: 2.13 CM
BH CV XLRA - TDI S': 11.1 CM/SEC
LEFT ATRIUM VOLUME INDEX: 43.7 ML/M2
LV EF BIPLANE MOD: 56.1 %
SINUS: 2.7 CM
STJ: 2.36 CM

## 2025-05-28 PROCEDURE — 93306 TTE W/DOPPLER COMPLETE: CPT

## 2025-05-28 PROCEDURE — 93356 MYOCRD STRAIN IMG SPCKL TRCK: CPT

## 2025-05-28 NOTE — PROGRESS NOTES
Date of Office Visit: 25  Encounter Provider: Andrew Perlata MD  Place of Service: [facility] CARDIOLOGY  Patient Name: Dee Jorge  :1945  [id number]    Chief Complaint   Patient presents with    Aortic Stenosis        HPI: Dee Jorge is a 80 y.o. female is a patient who sees Dr. Jasmin Mehta and has for a number of years she has aortic stenosis is seen big is trileaflet and degenerative.  Her last echo shows severe aortic stenosis with a peak of 76 and a mean of 44.  I saw her and we undertook a TAVR evaluation.  Her cath showed mild 30% mid LAD disease the disease otherwise the coronaries were normal.  In 2025 she had a #29 evolute valve implanted.  She had a little mild paravalvular leak.  She is now here for her 1 month follow-up    Her echo today shows normal LV function a gradient of 10 across the valve and a mild paravalvular leak.  In general she is doing better she feels like her breathing is better she is a little less fatigued she is having some dizziness and she is having difficulty with her blood pressure she also does not really like her carvedilol much.  No PND no orthopnea no edema the dizziness is not lightheaded dizziness it is just like usually when she is getting up so it seems like it might be orthostatic she has a wrist blood pressure cuff and some of the pressures in the 60s which I find hard to believe    Past Medical History:   Diagnosis Date    Abnormal electrocardiogram (ECG) (EKG) 2018    Actinic keratosis     Adnexal mass 2008    RIGHT    Anemia     Anxiety     Aortic regurgitation     Aortic stenosis     Arthritis     Asthma     Benign neoplasm of choroid 2015    Bronchitis     CAD (coronary artery disease)     Cataract 10/05/2016    BILATERAL    Constipation     Cystocele     Fatigue     Foot trauma, left, initial encounter 2017    Fracture of patella     Hearing loss     Hyperlipidemia     Hypertension      Hypothyroidism     ACQUIRED    Insomnia     Mastodynia 09/29/2015    OA (osteoarthritis)     Osteoporosis     Patella fracture 2010    LEFT    Positive occult stool blood test 10/04/2019    Postmenopausal     Rectocele     Right shoulder strain 08/31/2002    D/T FALL, SEEN AT [facility] ER    Rotator cuff tear, right 12/05/2014    SAW DR. CHARLES BARTLETT    Seborrheic keratosis     Trigger finger, left middle finger 02/2016       Past Surgical History:   Procedure Laterality Date    ANTERIOR AND POSTERIOR VAGINAL REPAIR N/A 09/21/2011    RECTOCELE AND CYSTOCELE REPAIR, PLACEMENT OF PROLIFT GRAFT, DR. LISA PALM AT [facility]    AORTIC VALVE REPAIR/REPLACEMENT N/A 4/8/2025    Procedure: TRANSFEMORAL TRANSCATHETER AORTIC VALVE REPLACEMENT WITH POSSIBLE OPEN RESCUE SURGERY;  Surgeon: Ochoa Simpson MD;  Location: [facility];  Service: Cardiothoracic;  Laterality: N/A;    AORTIC VALVE REPAIR/REPLACEMENT N/A 4/8/2025    Procedure: Transfemoral Transcatheter Aortic Valve Replacement with Possible Open Rescue Surgery;  Surgeon: Andrew Peralta MD;  Location: [facility];  Service: Cardiovascular;  Laterality: N/A;    BUNIONECTOMY Left 08/02/2010    LEFT MEDIAL EXTRA-ARTICULAR GANGLION CYST REMOVAL AND FIRST MTP CHILECTOMY WITH SYNOVECTOMY, DR. BHAVANI BABCOCK AT [facility]    CARDIAC CATHETERIZATION N/A 3/13/2025    Procedure: Left Heart Cath;  Surgeon: Andrew Peralta MD;  Location: [facility] CATH INVASIVE LOCATION;  Service: Cardiology;  Laterality: N/A;    CARDIAC CATHETERIZATION N/A 3/13/2025    Procedure: Coronary angiography;  Surgeon: Andrew Peralta MD;  Location: [facility] CATH INVASIVE LOCATION;  Service: Cardiology;  Laterality: N/A;    CHOLECYSTECTOMY N/A 1992    DR. QUINCY CEE    COLONOSCOPY N/A 09/18/2015    ENTIRE COLON WNL, RESCOPE IN 10 YRS, DR. QUINCY VILLARREAL AT [place]    COLONOSCOPY N/A 07/20/2005    WNL, DR. ANDREW VAZQUEZ AT [facility]    COLONOSCOPY N/A 1/22/2020    ENTIRE COLON WNL, RESCOPE IN 10 YRS, DR. MAGANA  FER AT [facility]    HERNIA REPAIR Bilateral 1972    DR. AIDEN ARZATE    HYSTERECTOMY N/A 1984    DR. ZEKE BLANDON    KNEE ARTHROSCOPY Right     KNEE CARTILAGE SURGERY Right 08/21/2012    RIGHT CHONDROPLASTY OF PATELLA AND TROCHLEA, CHONDROPLASTY MEDIAL FEMORAL CONDYLE, CHONDROPLASTY LATERAL TIBIAL PLATEAU AND LATERAL FEMORAL CONDYLE, SUBTOTAL LATERAL MENISCECTOMY, OSTEOPLASTY ANTERIOR TIBIAL INTERCONDYLAR NOTCH, DR. BHAVANI BABCOCK AT [facility]    NASAL ENDOSCOPY N/A 03/11/2015    ANTERIOR RHINOSCOPY, CONGESTION OF NASAL MUCOSA, DR. BRENDAN CHAMPION    SALPINGO OOPHORECTOMY Right 07/30/2008    FOR ADNEXAL MASS, DR. LISA PALM AT [facility]    TOTAL HIP ARTHROPLASTY Left 05/06/2013    DR.REID PALM AT [facility]    TOTAL HIP ARTHROPLASTY Right 10/13/2008    DR. KELVIN TARIQ AT [place]    TOTAL KNEE ARTHROPLASTY Left 01/20/2014    DR. GORDO PALM AT [facility]       Social History     Socioeconomic History    Marital status:    Tobacco Use    Smoking status: Never     Passive exposure: Never    Smokeless tobacco: Never   Vaping Use    Vaping status: Never Used   Substance and Sexual Activity    Alcohol use: No     Comment: daily caffiene    Drug use: No    Sexual activity: Defer     Birth control/protection: Post-menopausal, Surgical       Family History   Problem Relation Age of Onset    Hypertension Mother     Thyroid disease Mother     Stroke Mother     Asthma Mother     Asthma Father     Emphysema Father     Alcohol abuse Father     Hypertension Father     COPD Father     Kidney disease Sister     Hypertension Sister     Thyroid disease Daughter     Diabetes type I Daughter     Diabetes Daughter     Stroke Maternal Grandfather         ischemic    Twan's disease Grandchild     Asthma Grandchild     Breast cancer Neg Hx     Malig Hyperthermia Neg Hx        Review of Systems   Constitutional: Negative for decreased appetite, fever, malaise/fatigue and weight loss.   HENT:  Negative for nosebleeds.    Eyes:  Negative for double vision.    Cardiovascular:  Negative for chest pain, claudication, cyanosis, dyspnea on exertion, irregular heartbeat, leg swelling, near-syncope, orthopnea, palpitations, paroxysmal nocturnal dyspnea and syncope.   Respiratory:  Negative for cough, hemoptysis and shortness of breath.    Hematologic/Lymphatic: Negative for bleeding problem.   Skin:  Negative for rash.   Musculoskeletal:  Negative for falls and myalgias.   Gastrointestinal:  Negative for hematochezia, jaundice, melena, nausea and vomiting.   Genitourinary:  Negative for hematuria.   Neurological:  Negative for dizziness and seizures.   Psychiatric/Behavioral:  Negative for altered mental status and memory loss.        Allergies   Allergen Reactions    Statins Myalgia    Codeine Nausea And Vomiting    Penicillins Hives    Sulfa Antibiotics Nausea And Vomiting and Other (See Comments)     HEADACHES         Current Outpatient Medications:     aspirin 81 MG EC tablet, Take 1 tablet by mouth Daily., Disp: 100 tablet, Rfl: 3    busPIRone (BUSPAR) 5 MG tablet, Take 1 tablet by mouth 2 (Two) Times a Day., Disp: 60 tablet, Rfl: 3    carvedilol (COREG) 12.5 MG tablet, TAKE 1 TABLET BY MOUTH TWICE DAILY, Disp: 180 tablet, Rfl: 1    fluticasone (FLONASE) 50 MCG/ACT nasal spray, Administer 2 sprays into the nostril(s) as directed by provider Daily., Disp: 16 g, Rfl: 5    levalbuterol (Xopenex HFA) 45 MCG/ACT inhaler, Inhale 1-2 puffs Every 4 (Four) Hours As Needed for Wheezing., Disp: 1 each, Rfl: 5    levothyroxine (SYNTHROID, LEVOTHROID) 88 MCG tablet, TAKE 1 TABLET BY MOUTH DAILY, Disp: 90 tablet, Rfl: 1    Current Facility-Administered Medications:     cyanocobalamin injection 1,000 mcg, 1,000 mcg, Intramuscular, Q28 Days, Kacie Loving MD, 1,000 mcg at 03/24/25 0850    cyanocobalamin injection 1,000 mcg, 1,000 mcg, Intramuscular, Q28 Days, Kacie Loving MD, 1,000 mcg at 05/08/25 1000      Objective:     There were no vitals filed for this visit.    There is no  height or weight on file to calculate BMI.    Constitutional:       Appearance: Well-developed.   Eyes:      General: No scleral icterus.  HENT:      Head: Normocephalic.   Neck:      Thyroid: No thyromegaly.      Vascular: No JVD.      Lymphadenopathy: No cervical adenopathy.   Pulmonary:      Effort: Pulmonary effort is normal.      Breath sounds: Normal breath sounds. No wheezing. No rales.   Cardiovascular:      Normal rate. Regular rhythm.      Murmurs: There is a harsh midsystolic murmur at the URSB, radiating to the neck.      No gallop.    Edema:     Peripheral edema absent.   Abdominal:      Palpations: Abdomen is soft.      Tenderness: There is no abdominal tenderness.   Musculoskeletal: Normal range of motion. Skin:     General: Skin is warm and dry.      Findings: No rash.   Neurological:      Mental Status: Alert and oriented to person, place, and time.           ECG 12 Lead    Date/Time: 5/28/2025 3:56 PM  Performed by: Andrew Peralta MD    Authorized by: Andrew Peralta MD  Comparison: compared with previous ECG   Similar to previous ECG  Rhythm: sinus rhythm  Other findings: left ventricular hypertrophy with strain    Clinical impression: abnormal EKG           Assessment:       Diagnosis Plan   1. Aortic stenosis, severe        2. Essential hypertension        3. S/P TAVR (transcatheter aortic valve replacement)               Plan:       She is doing well I think status post TAVR the valve looks great she has a mild paravalvular leak.  I am not can make any changes with that she is getting get a regular blood pressure cuff around her arm check that she did pretty well with Aldactone it sounds like and she might be somebody to think about with that or losartan if her blood pressure stays high probably would switch to that she does not really like the carvedilol much I will have her come back and see me in a year sooner if she has trouble she has class I heart failure symptoms    No follow-ups on  file.     As always, it has been a pleasure to participate in your patient's care.      Sincerely,       Andrew Peralta MD

## 2025-06-09 ENCOUNTER — OFFICE VISIT (OUTPATIENT)
Dept: INTERNAL MEDICINE | Facility: CLINIC | Age: 80
End: 2025-06-09
Payer: MEDICARE

## 2025-06-09 VITALS
SYSTOLIC BLOOD PRESSURE: 172 MMHG | HEIGHT: 62 IN | WEIGHT: 118 LBS | BODY MASS INDEX: 21.71 KG/M2 | OXYGEN SATURATION: 95 % | HEART RATE: 64 BPM | DIASTOLIC BLOOD PRESSURE: 82 MMHG

## 2025-06-09 DIAGNOSIS — E53.8 B12 DEFICIENCY: ICD-10-CM

## 2025-06-09 DIAGNOSIS — E03.9 HYPOTHYROIDISM, UNSPECIFIED TYPE: ICD-10-CM

## 2025-06-09 DIAGNOSIS — R60.0 PERIPHERAL EDEMA: ICD-10-CM

## 2025-06-09 DIAGNOSIS — I10 ESSENTIAL HYPERTENSION: Primary | ICD-10-CM

## 2025-06-09 DIAGNOSIS — Z95.2 S/P TAVR (TRANSCATHETER AORTIC VALVE REPLACEMENT): ICD-10-CM

## 2025-06-09 LAB
BASOPHILS # BLD AUTO: 0.07 10*3/MM3 (ref 0–0.2)
BASOPHILS NFR BLD AUTO: 0.9 % (ref 0–1.5)
BUN SERPL-MCNC: 16 MG/DL (ref 8–23)
BUN/CREAT SERPL: 25 (ref 7–25)
CALCIUM SERPL-MCNC: 9.7 MG/DL (ref 8.6–10.5)
CHLORIDE SERPL-SCNC: 105 MMOL/L (ref 98–107)
CO2 SERPL-SCNC: 24.5 MMOL/L (ref 22–29)
CREAT SERPL-MCNC: 0.64 MG/DL (ref 0.57–1)
EGFRCR SERPLBLD CKD-EPI 2021: 88.9 ML/MIN/1.73
EOSINOPHIL # BLD AUTO: 0.38 10*3/MM3 (ref 0–0.4)
EOSINOPHIL NFR BLD AUTO: 4.8 % (ref 0.3–6.2)
ERYTHROCYTE [DISTWIDTH] IN BLOOD BY AUTOMATED COUNT: 13.1 % (ref 12.3–15.4)
GLUCOSE SERPL-MCNC: 101 MG/DL (ref 65–99)
HCT VFR BLD AUTO: 30.5 % (ref 34–46.6)
HGB BLD-MCNC: 9.8 G/DL (ref 12–15.9)
IMM GRANULOCYTES # BLD AUTO: 0.02 10*3/MM3 (ref 0–0.05)
IMM GRANULOCYTES NFR BLD AUTO: 0.3 % (ref 0–0.5)
LYMPHOCYTES # BLD AUTO: 1.65 10*3/MM3 (ref 0.7–3.1)
LYMPHOCYTES NFR BLD AUTO: 21 % (ref 19.6–45.3)
MCH RBC QN AUTO: 27.1 PG (ref 26.6–33)
MCHC RBC AUTO-ENTMCNC: 32.1 G/DL (ref 31.5–35.7)
MCV RBC AUTO: 84.5 FL (ref 79–97)
MONOCYTES # BLD AUTO: 0.56 10*3/MM3 (ref 0.1–0.9)
MONOCYTES NFR BLD AUTO: 7.1 % (ref 5–12)
NEUTROPHILS # BLD AUTO: 5.17 10*3/MM3 (ref 1.7–7)
NEUTROPHILS NFR BLD AUTO: 65.9 % (ref 42.7–76)
NRBC BLD AUTO-RTO: 0 /100 WBC (ref 0–0.2)
PLATELET # BLD AUTO: 402 10*3/MM3 (ref 140–450)
POTASSIUM SERPL-SCNC: 4.2 MMOL/L (ref 3.5–5.2)
RBC # BLD AUTO: 3.61 10*6/MM3 (ref 3.77–5.28)
SODIUM SERPL-SCNC: 140 MMOL/L (ref 136–145)
T4 FREE SERPL-MCNC: 1.33 NG/DL (ref 0.92–1.68)
TSH SERPL DL<=0.005 MIU/L-ACNC: 1.48 UIU/ML (ref 0.27–4.2)
WBC # BLD AUTO: 7.85 10*3/MM3 (ref 3.4–10.8)

## 2025-06-09 PROCEDURE — 96372 THER/PROPH/DIAG INJ SC/IM: CPT | Performed by: INTERNAL MEDICINE

## 2025-06-09 PROCEDURE — 1126F AMNT PAIN NOTED NONE PRSNT: CPT | Performed by: INTERNAL MEDICINE

## 2025-06-09 PROCEDURE — 3079F DIAST BP 80-89 MM HG: CPT | Performed by: INTERNAL MEDICINE

## 2025-06-09 PROCEDURE — 3077F SYST BP >= 140 MM HG: CPT | Performed by: INTERNAL MEDICINE

## 2025-06-09 PROCEDURE — 1159F MED LIST DOCD IN RCRD: CPT | Performed by: INTERNAL MEDICINE

## 2025-06-09 PROCEDURE — 99214 OFFICE O/P EST MOD 30 MIN: CPT | Performed by: INTERNAL MEDICINE

## 2025-06-09 PROCEDURE — 1160F RVW MEDS BY RX/DR IN RCRD: CPT | Performed by: INTERNAL MEDICINE

## 2025-06-09 RX ORDER — SPIRONOLACTONE 25 MG/1
25 TABLET ORAL DAILY
Qty: 90 TABLET | Refills: 3 | Status: SHIPPED | OUTPATIENT
Start: 2025-06-09

## 2025-06-09 RX ORDER — CYANOCOBALAMIN 1000 UG/ML
1000 INJECTION, SOLUTION INTRAMUSCULAR; SUBCUTANEOUS
Status: SHIPPED | OUTPATIENT
Start: 2025-06-09

## 2025-06-09 RX ADMIN — CYANOCOBALAMIN 1000 MCG: 1000 INJECTION, SOLUTION INTRAMUSCULAR; SUBCUTANEOUS at 09:49

## 2025-06-09 NOTE — PROGRESS NOTES
"Chief Complaint   Patient presents with    Depression     Started new med  Post surgery follow up cardio    Hypothyroidism    s/p tavr       Depression    Nighttime awakenings:     PMH Includes: depression       Dee Jorge is a 80 y.o. female presents for follow up evaluation. Patient recently s/p TAVR. She has been doing well. She reports that \"I think I'm getting over the dizziness\". Improving balance w movement at home. She notes she declined cardiac rehab related to cost involved. ($35 x 3 days/ wk).   Patient does have some new peripheral edema noted.   Has hypothyroidism. She is on generic synthroid at this time and has been advised branded product.   Drinking strong starbucks 2 daily.     The following portions of the patient's history were reviewed and updated as appropriate: allergies, current medications, past family history, past medical history, past social history, past surgical history and problem list.  Current Outpatient Medications on File Prior to Visit   Medication Sig Dispense Refill    aspirin 81 MG EC tablet Take 1 tablet by mouth Daily. 100 tablet 3    carvedilol (COREG) 12.5 MG tablet TAKE 1 TABLET BY MOUTH TWICE DAILY 180 tablet 1    levothyroxine (SYNTHROID, LEVOTHROID) 88 MCG tablet TAKE 1 TABLET BY MOUTH DAILY 90 tablet 1    busPIRone (BUSPAR) 5 MG tablet Take 1 tablet by mouth 2 (Two) Times a Day. (Patient not taking: Reported on 6/9/2025) 60 tablet 3     No current facility-administered medications on file prior to visit.     Review of Systems   Constitutional: Negative.    HENT: Negative.     Eyes: Negative.    Respiratory: Negative.     Cardiovascular: Negative.    Gastrointestinal: Negative.    Endocrine: Negative.    Genitourinary: Negative.    Musculoskeletal: Negative.    Skin: Negative.    Allergic/Immunologic: Negative.    Neurological: Negative.    Hematological: Negative.    Psychiatric/Behavioral: Negative.         Objective   Physical Exam  Vitals and nursing note " "reviewed.   Constitutional:       Appearance: Normal appearance. She is well-developed.   HENT:      Head: Normocephalic and atraumatic.      Right Ear: Tympanic membrane and external ear normal.      Left Ear: Tympanic membrane and external ear normal.      Nose: Nose normal.      Mouth/Throat:      Mouth: Mucous membranes are moist.   Eyes:      Extraocular Movements: Extraocular movements intact.      Pupils: Pupils are equal, round, and reactive to light.   Cardiovascular:      Rate and Rhythm: Normal rate and regular rhythm.      Pulses: Normal pulses.      Heart sounds: Normal heart sounds.   Pulmonary:      Effort: Pulmonary effort is normal. No respiratory distress.      Breath sounds: Normal breath sounds.   Abdominal:      General: Abdomen is flat.      Palpations: Abdomen is soft.   Musculoskeletal:         General: Normal range of motion.      Cervical back: Normal range of motion and neck supple.   Skin:     General: Skin is warm and dry.   Neurological:      General: No focal deficit present.      Mental Status: She is alert and oriented to person, place, and time.   Psychiatric:         Mood and Affect: Mood normal.         Behavior: Behavior normal.         Thought Content: Thought content normal.         Judgment: Judgment normal.          /82   Pulse 64   Ht 157.5 cm (62\")   Wt 53.5 kg (118 lb)   LMP  (LMP Unknown)   SpO2 95%   BMI 21.58 kg/m²     Assessment & Plan   Diagnoses and all orders for this visit:    Essential hypertension  -     TSH  -     T4, Free  -     Basic Metabolic Panel  -     CBC & Differential    S/P TAVR (transcatheter aortic valve replacement)    Peripheral edema  -     TSH  -     T4, Free  -     Basic Metabolic Panel  -     CBC & Differential    Hypothyroidism, unspecified type  -     TSH  -     T4, Free  -     Basic Metabolic Panel  -     CBC & Differential    Other orders  -     spironolactone (Aldactone) 25 MG tablet; Take 1 tablet by mouth " Daily.        Patient w htn s/p tavr. She has peripheral edema. She will start sprionolactone one tablet daily. Will monitor renal function and postassium levels on this. She is encouraged to wear compression stockings in the day instead of evening while sleeping. She is to monitor bp routinely. May start buspar after comfortable w spironolactone. She discussed switching to branded synthroid.   She is advised to reduce / d/c caffeine.

## 2025-06-13 DIAGNOSIS — D64.9 ANEMIA, UNSPECIFIED TYPE: Primary | ICD-10-CM

## 2025-06-19 ENCOUNTER — TELEPHONE (OUTPATIENT)
Dept: INTERNAL MEDICINE | Facility: CLINIC | Age: 80
End: 2025-06-19
Payer: MEDICARE

## 2025-06-19 RX ORDER — LEVOTHYROXINE SODIUM 88 UG/1
88 TABLET ORAL DAILY
Qty: 90 TABLET | Refills: 1 | Status: SHIPPED | OUTPATIENT
Start: 2025-06-19 | End: 2025-06-20 | Stop reason: DRUGHIGH

## 2025-06-19 NOTE — TELEPHONE ENCOUNTER
Patient called and stated that she would like to go ahead send the Synthroid medication to [facility]. She wanted sent to a pharmacy that specialize in contributing synthroid medication but it is going to take to long to process and will run out of medication.    Can you please send in Synthroid refill request to her [facility] pharmacy    Best call back # [phone]

## 2025-06-20 RX ORDER — LEVOTHYROXINE SODIUM 88 MCG
88 TABLET ORAL
Qty: 90 TABLET | Refills: 3 | Status: SHIPPED | OUTPATIENT
Start: 2025-06-20 | End: 2025-06-23 | Stop reason: SDUPTHER

## 2025-06-20 RX ORDER — LEVOTHYROXINE SODIUM 88 MCG
88 TABLET ORAL
COMMUNITY
End: 2025-06-20 | Stop reason: SDUPTHER

## 2025-06-23 ENCOUNTER — TELEPHONE (OUTPATIENT)
Dept: INTERNAL MEDICINE | Facility: CLINIC | Age: 80
End: 2025-06-23
Payer: MEDICARE

## 2025-06-23 RX ORDER — LEVOTHYROXINE SODIUM 88 MCG
88 TABLET ORAL
Qty: 90 TABLET | Refills: 3 | Status: SHIPPED | OUTPATIENT
Start: 2025-06-23

## 2025-06-23 NOTE — TELEPHONE ENCOUNTER
Caller: Dee Jorge    Relationship: Self    Best call back number: [phone]    What is the best time to reach you: ANYTIME    Who are you requesting to speak with (clinical staff, provider,  specific staff member): CLINICAL     What was the call regarding: PATIENT IS CALLING TO SEE IF THE OFFICE HAS ANY SAMPLES OF SYNTHROID. SHE STATES THE PHARMACY WONT REFILL UNTIL 7/27. PLEASE CALL TO DISCUSS     Is it okay if the provider responds through MyChart: NO

## 2025-06-23 NOTE — TELEPHONE ENCOUNTER
Patient called and stated that she is completely out of her synthroid medication. And the samples that Lisa gave her. She said that she can not get the Synthroid prescription refilled until 7/7/2025 due to insurance because they already paid for the generic levothyroxine.    She states that she needs to take the synthroid every morning. Patient was wondering if she can take the generic levothyroxine until she get a refill of the synthroid or can she have more samples of the Synthroid to get her through until 07/07/2025    Please adviser    Best call back # [phone]

## 2025-06-23 NOTE — TELEPHONE ENCOUNTER
She absolutely can take levothyroxine 88 mcg. Also sent synthroid branded to [facility] direct mail order. Please give information on this.   GLEN

## 2025-07-01 RX ORDER — LEVOTHYROXINE SODIUM 88 MCG
88 TABLET ORAL
Qty: 90 TABLET | Refills: 3 | Status: SHIPPED | OUTPATIENT
Start: 2025-07-01

## 2025-07-03 ENCOUNTER — TELEPHONE (OUTPATIENT)
Dept: INTERNAL MEDICINE | Facility: CLINIC | Age: 80
End: 2025-07-03
Payer: MEDICARE

## 2025-08-14 ENCOUNTER — CONSULT (OUTPATIENT)
Dept: ONCOLOGY | Facility: CLINIC | Age: 80
End: 2025-08-14
Payer: MEDICARE

## 2025-08-14 ENCOUNTER — LAB (OUTPATIENT)
Dept: LAB | Facility: HOSPITAL | Age: 80
End: 2025-08-14
Payer: MEDICARE

## 2025-08-14 VITALS
DIASTOLIC BLOOD PRESSURE: 101 MMHG | WEIGHT: 116 LBS | SYSTOLIC BLOOD PRESSURE: 163 MMHG | OXYGEN SATURATION: 91 % | HEIGHT: 62 IN | TEMPERATURE: 97.8 F | HEART RATE: 82 BPM | BODY MASS INDEX: 21.35 KG/M2

## 2025-08-14 DIAGNOSIS — D64.9 ANEMIA, UNSPECIFIED TYPE: ICD-10-CM

## 2025-08-14 DIAGNOSIS — D64.9 ANEMIA, UNSPECIFIED TYPE: Primary | ICD-10-CM

## 2025-08-14 DIAGNOSIS — R79.89 ABNORMAL CBC: Primary | ICD-10-CM

## 2025-08-14 LAB
ALBUMIN SERPL-MCNC: 4.6 G/DL (ref 3.5–5.2)
ALBUMIN/GLOB SERPL: 2.1 G/DL
ALP SERPL-CCNC: 96 U/L (ref 39–117)
ALT SERPL W P-5'-P-CCNC: 16 U/L (ref 1–33)
ANION GAP SERPL CALCULATED.3IONS-SCNC: 7.8 MMOL/L (ref 5–15)
AST SERPL-CCNC: 23 U/L (ref 1–32)
BASOPHILS # BLD AUTO: 0.05 10*3/MM3 (ref 0–0.2)
BASOPHILS NFR BLD AUTO: 0.6 % (ref 0–1.5)
BILIRUB SERPL-MCNC: 0.4 MG/DL (ref 0–1.2)
BUN SERPL-MCNC: 13.4 MG/DL (ref 8–23)
BUN/CREAT SERPL: 23.9 (ref 7–25)
CALCIUM SPEC-SCNC: 9.8 MG/DL (ref 8.6–10.5)
CHLORIDE SERPL-SCNC: 104 MMOL/L (ref 98–107)
CO2 SERPL-SCNC: 24.2 MMOL/L (ref 22–29)
CREAT SERPL-MCNC: 0.56 MG/DL (ref 0.57–1)
DAT POLY-SP REAG RBC QL: NEGATIVE
DEPRECATED RDW RBC AUTO: 42.9 FL (ref 37–54)
EGFRCR SERPLBLD CKD-EPI 2021: 91.8 ML/MIN/1.73
EOSINOPHIL # BLD AUTO: 0.41 10*3/MM3 (ref 0–0.4)
EOSINOPHIL NFR BLD AUTO: 5 % (ref 0.3–6.2)
ERYTHROCYTE [DISTWIDTH] IN BLOOD BY AUTOMATED COUNT: 14.4 % (ref 12.3–15.4)
FERRITIN SERPL-MCNC: 19.3 NG/ML (ref 13–150)
FOLATE SERPL-MCNC: 13.1 NG/ML (ref 4.78–24.2)
GLOBULIN UR ELPH-MCNC: 2.2 GM/DL
GLUCOSE SERPL-MCNC: 122 MG/DL (ref 65–99)
HAPTOGLOB SERPL-MCNC: 132 MG/DL (ref 30–200)
HCT VFR BLD AUTO: 33.7 % (ref 34–46.6)
HGB BLD-MCNC: 10.8 G/DL (ref 12–15.9)
HGB RETIC QN AUTO: 29 PG (ref 29.8–36.1)
IMM GRANULOCYTES # BLD AUTO: 0.03 10*3/MM3 (ref 0–0.05)
IMM GRANULOCYTES NFR BLD AUTO: 0.4 % (ref 0–0.5)
IMM RETICS NFR: 11.8 % (ref 3–15.8)
IRON 24H UR-MRATE: 33 MCG/DL (ref 37–145)
IRON SATN MFR SERPL: 7 % (ref 20–50)
LDH SERPL-CCNC: 245 U/L (ref 135–214)
LYMPHOCYTES # BLD AUTO: 1.62 10*3/MM3 (ref 0.7–3.1)
LYMPHOCYTES NFR BLD AUTO: 19.6 % (ref 19.6–45.3)
MCH RBC QN AUTO: 26.4 PG (ref 26.6–33)
MCHC RBC AUTO-ENTMCNC: 32 G/DL (ref 31.5–35.7)
MCV RBC AUTO: 82.4 FL (ref 79–97)
MONOCYTES # BLD AUTO: 0.6 10*3/MM3 (ref 0.1–0.9)
MONOCYTES NFR BLD AUTO: 7.2 % (ref 5–12)
NEUTROPHILS NFR BLD AUTO: 5.57 10*3/MM3 (ref 1.7–7)
NEUTROPHILS NFR BLD AUTO: 67.2 % (ref 42.7–76)
NRBC BLD AUTO-RTO: 0 /100 WBC (ref 0–0.2)
PLATELET # BLD AUTO: 348 10*3/MM3 (ref 140–450)
PMV BLD AUTO: 8.9 FL (ref 6–12)
POTASSIUM SERPL-SCNC: 4.4 MMOL/L (ref 3.5–5.2)
PROT SERPL-MCNC: 6.8 G/DL (ref 6–8.5)
RBC # BLD AUTO: 4.09 10*6/MM3 (ref 3.77–5.28)
RETICS # AUTO: 0.06 10*6/MM3 (ref 0.02–0.13)
RETICS/RBC NFR AUTO: 1.47 % (ref 0.7–1.9)
SODIUM SERPL-SCNC: 136 MMOL/L (ref 136–145)
TIBC SERPL-MCNC: 498 MCG/DL (ref 298–536)
TRANSFERRIN SERPL-MCNC: 334 MG/DL (ref 200–360)
VIT B12 BLD-MCNC: 572 PG/ML (ref 211–946)
WBC NRBC COR # BLD AUTO: 8.28 10*3/MM3 (ref 3.4–10.8)

## 2025-08-14 PROCEDURE — 36415 COLL VENOUS BLD VENIPUNCTURE: CPT

## 2025-08-14 PROCEDURE — 86880 COOMBS TEST DIRECT: CPT | Performed by: STUDENT IN AN ORGANIZED HEALTH CARE EDUCATION/TRAINING PROGRAM

## 2025-08-14 PROCEDURE — 83615 LACTATE (LD) (LDH) ENZYME: CPT | Performed by: STUDENT IN AN ORGANIZED HEALTH CARE EDUCATION/TRAINING PROGRAM

## 2025-08-14 PROCEDURE — 85025 COMPLETE CBC W/AUTO DIFF WBC: CPT

## 2025-08-14 PROCEDURE — 82746 ASSAY OF FOLIC ACID SERUM: CPT | Performed by: STUDENT IN AN ORGANIZED HEALTH CARE EDUCATION/TRAINING PROGRAM

## 2025-08-14 PROCEDURE — 82728 ASSAY OF FERRITIN: CPT | Performed by: STUDENT IN AN ORGANIZED HEALTH CARE EDUCATION/TRAINING PROGRAM

## 2025-08-14 PROCEDURE — 83540 ASSAY OF IRON: CPT | Performed by: STUDENT IN AN ORGANIZED HEALTH CARE EDUCATION/TRAINING PROGRAM

## 2025-08-14 PROCEDURE — 80053 COMPREHEN METABOLIC PANEL: CPT | Performed by: STUDENT IN AN ORGANIZED HEALTH CARE EDUCATION/TRAINING PROGRAM

## 2025-08-14 PROCEDURE — 83010 ASSAY OF HAPTOGLOBIN QUANT: CPT | Performed by: STUDENT IN AN ORGANIZED HEALTH CARE EDUCATION/TRAINING PROGRAM

## 2025-08-14 PROCEDURE — 82607 VITAMIN B-12: CPT | Performed by: STUDENT IN AN ORGANIZED HEALTH CARE EDUCATION/TRAINING PROGRAM

## 2025-08-14 PROCEDURE — 85046 RETICYTE/HGB CONCENTRATE: CPT | Performed by: STUDENT IN AN ORGANIZED HEALTH CARE EDUCATION/TRAINING PROGRAM

## 2025-08-14 PROCEDURE — 84466 ASSAY OF TRANSFERRIN: CPT | Performed by: STUDENT IN AN ORGANIZED HEALTH CARE EDUCATION/TRAINING PROGRAM

## 2025-08-21 ENCOUNTER — OFFICE VISIT (OUTPATIENT)
Dept: ONCOLOGY | Facility: CLINIC | Age: 80
End: 2025-08-21
Payer: MEDICARE

## 2025-08-21 VITALS
SYSTOLIC BLOOD PRESSURE: 140 MMHG | HEIGHT: 62 IN | DIASTOLIC BLOOD PRESSURE: 58 MMHG | OXYGEN SATURATION: 100 % | BODY MASS INDEX: 21.66 KG/M2 | HEART RATE: 68 BPM | WEIGHT: 117.7 LBS | TEMPERATURE: 98.2 F

## 2025-08-21 DIAGNOSIS — D50.9 IRON DEFICIENCY ANEMIA, UNSPECIFIED IRON DEFICIENCY ANEMIA TYPE: Primary | ICD-10-CM

## 2025-08-21 RX ORDER — FERROUS SULFATE 325(65) MG
325 TABLET ORAL EVERY OTHER DAY
Qty: 90 TABLET | Refills: 0 | Status: SHIPPED | OUTPATIENT
Start: 2025-08-21

## 2025-08-21 RX ORDER — SPIRONOLACTONE 25 MG/1
25 TABLET ORAL DAILY
COMMUNITY

## (undated) DEVICE — CATH DIAG CARD PERFORMA JL3.5 BT 4F100CM

## (undated) DEVICE — LOADING SYS L-EVOLUTFX-2329: Brand: EVOLUT™ FX

## (undated) DEVICE — DRSNG WND GZ PAD BORDERED 4X8IN STRL

## (undated) DEVICE — ADAPT CLN BIOGUARD AIR/H2O DISP

## (undated) DEVICE — CATH VENT MIV RADL PIG ST TIP 4F 110CM

## (undated) DEVICE — SPNG GZ WOVN 4X4IN 12PLY 10/BX STRL

## (undated) DEVICE — EQUIPMENT COVER BAG TYPE 48” X 36” (122CM X 91CM): Brand: EQUIPMENT COVER BAG TYPE

## (undated) DEVICE — CATH DIAG IMPULSE FR4 5F 100CM

## (undated) DEVICE — GLV SURG BIOGEL LTX PF 7 1/2

## (undated) DEVICE — CATH DIAG CARD PERFORM JR4.0 BT 4F100CM

## (undated) DEVICE — TOWEL,OR,DSP,ST,BLUE,STD,4/PK,20PK/CS: Brand: MEDLINE

## (undated) DEVICE — GW HITORQUE/BAL MID/WT J W/HCOAT .014 3X190CM

## (undated) DEVICE — CANN O2 ETCO2 FITS ALL CONN CO2 SMPL A/ 7IN DISP LF

## (undated) DEVICE — CONTAINER,SPECIMEN,OR STERILE,4OZ: Brand: MEDLINE

## (undated) DEVICE — PINNACLE INTRODUCER SHEATH: Brand: PINNACLE

## (undated) DEVICE — DGW .035 FC J3MM 260CM TEF: Brand: EMERALD

## (undated) DEVICE — RADIFOCUS GLIDEWIRE: Brand: GLIDEWIRE

## (undated) DEVICE — SNAP KAP: Brand: UNBRANDED

## (undated) DEVICE — SOL NACL 0.9PCT 1000ML

## (undated) DEVICE — KT ORCA ORCAPOD DISP STRL

## (undated) DEVICE — PERCLOSE™ PROSTYLE™ SUTURE-MEDIATED CLOSURE AND REPAIR SYSTEM: Brand: PERCLOSE™ PROSTYLE™

## (undated) DEVICE — LN SMPL CO2 SHTRM SD STREAM W/M LUER

## (undated) DEVICE — Device

## (undated) DEVICE — CATH DIAG IMPULSE AL1 6F 100CM

## (undated) DEVICE — 3M™ BAIR HUGGER® UNDERBODY BLANKET, FULL ACCESS, 10 PER CASE 63500: Brand: BAIR HUGGER™

## (undated) DEVICE — CATH DIAG IMPULSE PIG145 6F 110CM

## (undated) DEVICE — 3M™ IOBAN™ 2 ANTIMICROBIAL INCISE DRAPE 6650EZ: Brand: IOBAN™ 2

## (undated) DEVICE — TR BAND RADIAL ARTERY COMPRESSION DEVICE: Brand: TR BAND

## (undated) DEVICE — SENSR O2 OXIMAX FNGR A/ 18IN NONSTR

## (undated) DEVICE — SHORT SPIKE VENTED W/3-WAY SC: Brand: MEDLINE INDUSTRIES, INC.

## (undated) DEVICE — DRAPE,REIN 53X77,STERILE: Brand: MEDLINE

## (undated) DEVICE — 3M™ TEGADERM™ CHG DRESSING 25/CARTON 4 CARTONS/CASE 1658: Brand: TEGADERM™

## (undated) DEVICE — TBG INJ CONTRL PVCCLR RIGD RA 1200PSI 10

## (undated) DEVICE — GLIDESHEATH BASIC HYDROPHILIC COATED INTRODUCER SHEATH: Brand: GLIDESHEATH

## (undated) DEVICE — DELIV SYS D-EVOLUTFX-2329: Brand: EVOLUT™ FX

## (undated) DEVICE — THE TORRENT IRRIGATION SCOPE CONNECTOR IS USED WITH THE TORRENT IRRIGATION TUBING TO PROVIDE IRRIGATION FLUIDS SUCH AS STERILE WATER DURING GASTROINTESTINAL ENDOSCOPIC PROCEDURES WHEN USED IN CONJUNCTION WITH AN IRRIGATION PUMP (OR ELECTROSURGICAL UNIT).: Brand: TORRENT

## (undated) DEVICE — TBG PRESS 96IN M/F ROT BRAID: Brand: MEDLINE INDUSTRIES, INC.

## (undated) DEVICE — GW TAVI CONFIDA CRV .035INX260CM

## (undated) DEVICE — PK CATH CARD 40

## (undated) DEVICE — CATH DIAG CARD PERFORMA JL4.0 BT 4F100CM

## (undated) DEVICE — SOL NS 500ML

## (undated) DEVICE — SENSR CERBRL O2 PK/2

## (undated) DEVICE — CVR PROB 96IN LF STRL

## (undated) DEVICE — CVR HNDL LT SURG ACCSSRY BLU STRL

## (undated) DEVICE — KT MANIFLD CARDIAC

## (undated) DEVICE — TUBING, SUCTION, 1/4" X 10', STRAIGHT: Brand: MEDLINE

## (undated) DEVICE — SPONGE,LAP,18"X18",DLX,XR,ST,5/PK,40/PK: Brand: MEDLINE

## (undated) DEVICE — CATH ELECTRD PACE TEMP BI NONHEP 5F110CM

## (undated) DEVICE — DIL RO .035 20FX30 AND 22FX30 ST

## (undated) DEVICE — CATH DIAG IMPULSE FL5 5F 100CM

## (undated) DEVICE — CATH DIAG IMPULSE PIG 5F 100CM

## (undated) DEVICE — DISPOSABLE ADAPTER

## (undated) DEVICE — DIL ENDOVASC .035 16X30F AND 18X30FST

## (undated) DEVICE — SOL ISO/ALC 70PCT 4OZ

## (undated) DEVICE — TAVR: Brand: MEDLINE INDUSTRIES, INC.

## (undated) DEVICE — SOL IRR NACL 0.9PCT BO 1000ML

## (undated) DEVICE — DRP INCISE CARDIO W/ADHS 115X85X151IN LG STRL

## (undated) DEVICE — SUT SILK 2 SUTUPAK TIE 60IN SA8H 2STRAND

## (undated) DEVICE — SOL IRR H2O BO 1000ML STRL

## (undated) DEVICE — INTRO SHEATH ART/FEM ENGAGE .035 6F12CM

## (undated) DEVICE — DECANTER BAG 9": Brand: MEDLINE INDUSTRIES, INC.